# Patient Record
Sex: MALE | Race: WHITE | NOT HISPANIC OR LATINO | Employment: OTHER | URBAN - NONMETROPOLITAN AREA
[De-identification: names, ages, dates, MRNs, and addresses within clinical notes are randomized per-mention and may not be internally consistent; named-entity substitution may affect disease eponyms.]

---

## 2018-11-26 ENCOUNTER — TELEPHONE (OUTPATIENT)
Dept: PULMONOLOGY | Facility: CLINIC | Age: 63
End: 2018-11-26

## 2018-12-05 RX ORDER — ALBUTEROL SULFATE 90 UG/1
2 AEROSOL, METERED RESPIRATORY (INHALATION) EVERY 4 HOURS PRN
Qty: 18 G | Refills: 11 | Status: SHIPPED | OUTPATIENT
Start: 2018-12-05 | End: 2021-02-10 | Stop reason: SDUPTHER

## 2018-12-05 NOTE — TELEPHONE ENCOUNTER
Pt called his insurance company and they will cover his Trelegy and ventolin. Pt asks for these to be sent to [facility] and he will have them put the rx's on hold until 01/01/2019.

## 2019-01-03 DIAGNOSIS — J44.9 CHRONIC OBSTRUCTIVE PULMONARY DISEASE, UNSPECIFIED COPD TYPE (HCC): Primary | ICD-10-CM

## 2019-01-03 NOTE — TELEPHONE ENCOUNTER
Needing Ventolin, Symbicort and Trelegy [facility].  Don't know why it wasn't sent last month.    Please approve these and they will go to the pharmacy.  Patient is going to check with pharmacy tomorrow.

## 2019-01-03 NOTE — TELEPHONE ENCOUNTER
Left message for patient to call back to let us know which inhaler he wants called in Trelegy or Symbicort.

## 2019-01-04 RX ORDER — BUDESONIDE AND FORMOTEROL FUMARATE DIHYDRATE 160; 4.5 UG/1; UG/1
2 AEROSOL RESPIRATORY (INHALATION) 2 TIMES DAILY
Qty: 1 INHALER | Refills: 11 | Status: CANCELLED | OUTPATIENT
Start: 2019-01-04

## 2019-01-04 RX ORDER — ALBUTEROL SULFATE 90 UG/1
2 AEROSOL, METERED RESPIRATORY (INHALATION) EVERY 4 HOURS PRN
Qty: 1 INHALER | Refills: 11 | Status: SHIPPED | OUTPATIENT
Start: 2019-01-04 | End: 2019-02-03

## 2019-01-04 NOTE — TELEPHONE ENCOUNTER
Patient really wants to get both Trelegy and Symbicort. He states he likes to alternate using them and knows not to use them together.  I did tell him if you did approve the refills on both, his insurance may not pay for them.   He prefers Trelegy over Symbicort.

## 2019-01-04 NOTE — TELEPHONE ENCOUNTER
I cannot prescribe them both as I cannot verify that he is alternating them and not taking them at the same time.  We will send in the prescription for Trelegy.

## 2019-04-16 PROBLEM — J44.9 STAGE 4 VERY SEVERE COPD BY GOLD CLASSIFICATION: Status: ACTIVE | Noted: 2019-04-16

## 2019-04-22 PROBLEM — Z87.891 PERSONAL HISTORY OF NICOTINE DEPENDENCE: Status: ACTIVE | Noted: 2019-04-22

## 2019-04-22 PROBLEM — I10 ESSENTIAL HYPERTENSION: Status: ACTIVE | Noted: 2019-04-22

## 2019-04-22 NOTE — PROGRESS NOTES
REYES Sandoval  [facility]   Respiratory Disease Clinic  [address] [place] [address]  Phone: [phone]  Fax: [phone]     Fran Dimas is a 64 y.o. male.   CC:   Chief Complaint   Patient presents with   • Shortness of Breath        HPI: Mr. Dimas is coming in for a follow-up of his COPD.  He does experience moderate persistent shortness of breath on a daily basis.  It is improved with the use of Trelegy.  He is on Bronkaid daily.  He has Ventolin he can use on an as-needed basis.  This is 3-4 times a day at baseline and has done so for years.    The following portions of the patient's history were reviewed and updated as appropriate: allergies, current medications, past family history, past medical history, past social history, past surgical history and problem list.    Past Medical History:   Diagnosis Date   • Essential hypertension 4/22/2019   • Personal history of nicotine dependence 4/22/2019       Family History   Problem Relation Age of Onset   • No Known Problems Mother    • Cancer Father        Social History     Socioeconomic History   • Marital status:      Spouse name: Not on file   • Number of children: Not on file   • Years of education: Not on file   • Highest education level: Not on file   Tobacco Use   • Smoking status: Former Smoker   • Smokeless tobacco: Never Used   Substance and Sexual Activity   • Alcohol use: Yes     Drinks per session: 7 to 9     Comment: daily   • Drug use: No   • Sexual activity: Defer       Review of Systems   Constitutional: Positive for fatigue. Negative for activity change, chills and fever.   HENT: Negative for congestion, postnasal drip, rhinorrhea, sinus pressure, sore throat and trouble swallowing.    Eyes: Negative for blurred vision, double vision and pain.   Respiratory: Positive for cough and shortness of breath. Negative for chest tightness and wheezing.    Cardiovascular: Negative for chest pain, palpitations and  leg swelling.   Gastrointestinal: Negative for abdominal distention, constipation, diarrhea, nausea and vomiting.   Endocrine: Negative for polydipsia, polyphagia and polyuria.   Genitourinary: Negative for dysuria, frequency and urgency.   Musculoskeletal: Negative for arthralgias, back pain, gait problem and joint swelling.   Skin: Negative for color change, dry skin, rash and skin lesions.   Allergic/Immunologic: Negative for environmental allergies, food allergies and immunocompromised state.   Neurological: Negative for dizziness, seizures, speech difficulty, weakness, light-headedness, memory problem and confusion.   Hematological: Negative for adenopathy. Does not bruise/bleed easily.   Psychiatric/Behavioral: Negative for sleep disturbance, negative for hyperactivity and depressed mood. The patient is not nervous/anxious.        .vs    Physical Exam   Constitutional: He is oriented to person, place, and time. He appears well-developed and well-nourished. No distress.   HENT:   Head: Normocephalic and atraumatic.   Right Ear: External ear normal.   Left Ear: External ear normal.   Nose: Nose normal.   Mouth/Throat: Oropharynx is clear and moist. No oropharyngeal exudate.   Eyes: Conjunctivae and EOM are normal. Pupils are equal, round, and reactive to light. Right eye exhibits no discharge. Left eye exhibits no discharge.   Neck: Normal range of motion. Neck supple. No JVD present.   Cardiovascular: Normal rate and regular rhythm.   No murmur heard.  Pulmonary/Chest: Effort normal. No respiratory distress. He has no wheezes. He has rales in the left upper field.   Abdominal: Soft. Bowel sounds are normal. He exhibits no distension. There is no tenderness.   Musculoskeletal: Normal range of motion. He exhibits no edema or deformity.   Neurological: He is alert and oriented to person, place, and time. He displays normal reflexes. No cranial nerve deficit. Coordination normal.   Skin: Skin is warm and dry. No  rash noted. He is not diaphoretic. There is cyanosis. No erythema.   Chronic cyanotic changes to both hands, raynaud's     Psychiatric: He has a normal mood and affect. His behavior is normal. Thought content normal.   Nursing note and vitals reviewed.      Pulmonary Functions Testing Results:    No results found for: FEV1, FVC, [id number], TLC, DLCO  Patient refused PFTs    CXR: No imaging on file since 2012.  Refuses imaging        Problem List Items Addressed This Visit        Cardiovascular and Mediastinum    Essential hypertension       Respiratory    Stage 4 very severe COPD by GOLD classification (CMS/HCC) - Primary       Other    Personal history of nicotine dependence        Patient's Body mass index is 23.57 kg/m². BMI is within normal parameters. No follow-up required..    Dates his breathing is doing as well as it typically does.  He will continue his Trelegy and bronchaid daily since he is benefiting from that.  He has Ventolin he can use when needed.  Reassessment in 6 months or sooner if the patient needs    Kristie Naqvi, REYES  4/23/2019  2:00 PM    Return in about 6 months (around 10/23/2019).

## 2019-04-23 ENCOUNTER — OFFICE VISIT (OUTPATIENT)
Dept: PULMONOLOGY | Facility: CLINIC | Age: 64
End: 2019-04-23

## 2019-04-23 VITALS
WEIGHT: 155 LBS | BODY MASS INDEX: 23.49 KG/M2 | DIASTOLIC BLOOD PRESSURE: 80 MMHG | OXYGEN SATURATION: 99 % | HEIGHT: 68 IN | SYSTOLIC BLOOD PRESSURE: 130 MMHG | HEART RATE: 87 BPM

## 2019-04-23 DIAGNOSIS — J44.9 STAGE 4 VERY SEVERE COPD BY GOLD CLASSIFICATION (HCC): Primary | ICD-10-CM

## 2019-04-23 DIAGNOSIS — I10 ESSENTIAL HYPERTENSION: ICD-10-CM

## 2019-04-23 DIAGNOSIS — Z87.891 PERSONAL HISTORY OF NICOTINE DEPENDENCE: ICD-10-CM

## 2019-04-23 PROCEDURE — 99214 OFFICE O/P EST MOD 30 MIN: CPT | Performed by: NURSE PRACTITIONER

## 2019-05-22 DIAGNOSIS — J44.9 STAGE 4 VERY SEVERE COPD BY GOLD CLASSIFICATION (HCC): Primary | ICD-10-CM

## 2019-10-17 NOTE — PROGRESS NOTES
REYES Sandoval  [facility]   Respiratory Disease Clinic  [address] [place] [address]  Phone: [phone]  Fax: [phone]     Fran Dimas is a 64 y.o. male.   CC:   Chief Complaint   Patient presents with   • COPD        HPI: Mr. Dimas is coming in for a follow-up of his COPD.  He experiences moderate persistent shortness of breath occurring in the chest daily for several years.  It is worsened with activity and improved with the use of daily Bronkaid and bronchodilators.  He is on Trelegy.  He prefers Symbicort however his insurance would not cover it.  He would like samples if we have them available.  He has Ventolin he uses daily approximately 3-4 times.  This is not a new regimen for him.  No new complaints or issues.  No new medicines.    The following portions of the patient's history were reviewed and updated as appropriate: allergies, current medications, past family history, past medical history, past social history, past surgical history and problem list.    Past Medical History:   Diagnosis Date   • Essential hypertension 4/22/2019   • Personal history of nicotine dependence 4/22/2019       Prior to Admission medications    Medication Sig Start Date End Date Taking? Authorizing Provider   albuterol 108 (90 Base) MCG/ACT inhaler Inhale 2 puffs Every 4 (Four) Hours As Needed for Wheezing. 12/5/18   Kristie Naqvi APRN   ePHEDrine-guaiFENesin (BRONKAID)  MG tablet Take 1 tablet by mouth Every 4 (Four) Hours As Needed (congestion). 6/11/19   Kristie Naqvi APRN   Fluticasone-Umeclidin-Vilant (TRELEGY ELLIPTA) 100-62.5-25 MCG/INH aerosol powder  Inhale 1 each Daily. 12/19/18   Kristie Naqvi APRN       Family History   Problem Relation Age of Onset   • No Known Problems Mother    • Cancer Father        Social History     Socioeconomic History   • Marital status:      Spouse name: Not on file   • Number of children: Not on file   • Years of education: Not  "on file   • Highest education level: Not on file   Tobacco Use   • Smoking status: Former Smoker   • Smokeless tobacco: Never Used   Substance and Sexual Activity   • Alcohol use: Yes     Drinks per session: 7 to 9     Comment: daily   • Drug use: No   • Sexual activity: Defer       Review of Systems   Constitutional: Positive for fatigue. Negative for activity change, chills and fever.   HENT: Positive for congestion. Negative for postnasal drip, rhinorrhea, sinus pressure, sore throat and trouble swallowing.    Eyes: Negative for blurred vision, double vision and pain.   Respiratory: Positive for cough, shortness of breath and wheezing. Negative for chest tightness.    Cardiovascular: Negative for chest pain, palpitations and leg swelling.   Gastrointestinal: Negative for abdominal distention, constipation, diarrhea, nausea and vomiting.   Endocrine: Negative for polydipsia, polyphagia and polyuria.   Genitourinary: Negative for dysuria, frequency and urgency.   Musculoskeletal: Negative for arthralgias, back pain, gait problem and joint swelling.   Skin: Negative for color change, dry skin, rash and skin lesions.   Allergic/Immunologic: Negative for environmental allergies, food allergies and immunocompromised state.   Neurological: Negative for dizziness, seizures, speech difficulty, weakness, light-headedness, memory problem and confusion.   Hematological: Negative for adenopathy. Does not bruise/bleed easily.   Psychiatric/Behavioral: Negative for sleep disturbance, negative for hyperactivity and depressed mood. The patient is nervous/anxious.        /82   Pulse 96   Ht 172.7 cm (68\")   Wt 70.3 kg (155 lb)   SpO2 99% Comment: RA  BMI 23.57 kg/m²     Physical Exam   Constitutional: He is oriented to person, place, and time. He appears well-developed and well-nourished. He appears cachectic. No distress.   HENT:   Head: Normocephalic and atraumatic.   Right Ear: External ear normal.   Left Ear: " External ear normal.   Nose: Nose normal.   Mouth/Throat: Oropharynx is clear and moist. No oropharyngeal exudate.   Eyes: Conjunctivae and EOM are normal. Pupils are equal, round, and reactive to light. Right eye exhibits no discharge. Left eye exhibits no discharge.   Neck: Normal range of motion. Neck supple. No JVD present.   Cardiovascular: Normal rate and regular rhythm.   No murmur heard.  Pulmonary/Chest: Effort normal. No accessory muscle usage. Tachypnea noted. No respiratory distress. He has decreased breath sounds. He has no wheezes. He has rhonchi.   Barrel chested   Abdominal: Soft. Bowel sounds are normal. He exhibits no distension. There is no tenderness.   Musculoskeletal: Normal range of motion. He exhibits no edema or deformity.   Neurological: He is alert and oriented to person, place, and time. He displays normal reflexes. No cranial nerve deficit. Coordination normal.   Skin: Skin is warm and dry. No rash noted. He is not diaphoretic. No erythema.   Cool dusky fingers, chronic   Psychiatric: He has a normal mood and affect. His behavior is normal. Thought content normal.   Nursing note and vitals reviewed.      Pulmonary Functions Testing Results:    No notes on file  No PFTs for this visit    CXR: No imaging for this visit        Problem List Items Addressed This Visit        Cardiovascular and Mediastinum    Essential hypertension       Respiratory    Stage 4 very severe COPD by GOLD classification (CMS/HCC) - Primary    Relevant Medications    budesonide-formoterol (SYMBICORT) 160-4.5 MCG/ACT inhaler       Other    Personal history of nicotine dependence        Patient's Body mass index is 23.57 kg/m². BMI is within normal parameters. No follow-up required..      Assessment/Plan         He reports stability from a pulmonary standpoint.  Samples provided of Symbicort.  He is aware he cannot use this and Trelegy together.  He does not need refills on his bronchodilators or his Bronkaid.  He  refuses a flu shot.  Follow-up in 6 months.  He will call sooner if needed.    Kristie Naqvi, APRN  10/24/2019  2:17 PM    Return in about 6 months (around 4/24/2020).

## 2019-10-24 ENCOUNTER — OFFICE VISIT (OUTPATIENT)
Dept: PULMONOLOGY | Facility: CLINIC | Age: 64
End: 2019-10-24

## 2019-10-24 VITALS
DIASTOLIC BLOOD PRESSURE: 82 MMHG | SYSTOLIC BLOOD PRESSURE: 138 MMHG | HEART RATE: 96 BPM | OXYGEN SATURATION: 99 % | WEIGHT: 155 LBS | BODY MASS INDEX: 23.49 KG/M2 | HEIGHT: 68 IN

## 2019-10-24 DIAGNOSIS — J44.9 STAGE 4 VERY SEVERE COPD BY GOLD CLASSIFICATION (HCC): Primary | ICD-10-CM

## 2019-10-24 DIAGNOSIS — Z87.891 PERSONAL HISTORY OF NICOTINE DEPENDENCE: ICD-10-CM

## 2019-10-24 DIAGNOSIS — I10 ESSENTIAL HYPERTENSION: ICD-10-CM

## 2019-10-24 PROCEDURE — 99213 OFFICE O/P EST LOW 20 MIN: CPT | Performed by: NURSE PRACTITIONER

## 2019-10-24 RX ORDER — BUDESONIDE AND FORMOTEROL FUMARATE DIHYDRATE 160; 4.5 UG/1; UG/1
2 AEROSOL RESPIRATORY (INHALATION)
Qty: 2 INHALER | Refills: 0 | COMMUNITY
Start: 2019-10-24 | End: 2019-11-07

## 2020-01-27 DIAGNOSIS — J44.9 STAGE 4 VERY SEVERE COPD BY GOLD CLASSIFICATION (HCC): Primary | ICD-10-CM

## 2020-01-27 RX ORDER — ALBUTEROL SULFATE 90 UG/1
2 AEROSOL, METERED RESPIRATORY (INHALATION) EVERY 4 HOURS PRN
Qty: 1 INHALER | Refills: 11 | Status: SHIPPED | OUTPATIENT
Start: 2020-01-27 | End: 2020-01-28 | Stop reason: SDUPTHER

## 2020-01-28 RX ORDER — ALBUTEROL SULFATE 90 UG/1
2 AEROSOL, METERED RESPIRATORY (INHALATION) EVERY 4 HOURS PRN
Qty: 1 INHALER | Refills: 11 | Status: SHIPPED | OUTPATIENT
Start: 2020-01-28 | End: 2020-10-27 | Stop reason: ALTCHOICE

## 2020-01-28 NOTE — TELEPHONE ENCOUNTER
Fax received stating that the script sent yesterday needed a DX code on it.  There is one but I am resending to them with the same code on it.

## 2020-04-15 NOTE — PROGRESS NOTES
REYES Sandoval  [facility]   Respiratory Disease Clinic  [address] [place] [address]  Phone: [phone]  Fax: [phone]     Fran Dimas is a 65 y.o. male.   CC:   Chief Complaint   Patient presents with   • COPD        HPI: Mr. Dimas is is being evaluated today for management of his COPD.  He experiences moderate persistent shortness of breath occurring in the chest daily for several years.  It is worsened with activity and improved with the use of daily Bronkaid and bronchodilators.  He is on Trelegy.  He prefers Symbicort however his insurance would not cover it.  He utilizes his rescue inhaler 3-4 times daily at baseline.  This is not a new regimen for him.  No new complaints or issues.  No new medicines.  He does not need refills.  He is going to be traveling to [place] to check on his 86-year-old mother who is in assisted living.    The following portions of the patient's history were reviewed and updated as appropriate: allergies, current medications, past family history, past medical history, past social history, past surgical history and problem list.    Past Medical History:   Diagnosis Date   • Essential hypertension 4/22/2019   • Personal history of nicotine dependence 4/22/2019       Prior to Admission medications    Medication Sig Start Date End Date Taking? Authorizing Provider   albuterol 108 (90 Base) MCG/ACT inhaler Inhale 2 puffs Every 4 (Four) Hours As Needed for Wheezing. 12/5/18   Kristie Naqvi APRN   albuterol sulfate HFA (VENTOLIN HFA) 108 (90 Base) MCG/ACT inhaler Inhale 2 puffs Every 4 (Four) Hours As Needed for Wheezing. 1/28/20   Kristie Naqvi APRN   ePHEDrine-guaiFENesin (BRONKAID)  MG tablet Take 1 tablet by mouth Every 4 (Four) Hours As Needed (congestion). 6/11/19   Kristie Naqvi APRN   Fluticasone-Umeclidin-Vilant (TRELEGY ELLIPTA) 100-62.5-25 MCG/INH aerosol powder  Inhale 1 puff Daily. 1/6/20   Kristie Naqvi APRN        Family History   Problem Relation Age of Onset   • No Known Problems Mother    • Cancer Father        Social History     Socioeconomic History   • Marital status:      Spouse name: Not on file   • Number of children: Not on file   • Years of education: Not on file   • Highest education level: Not on file   Tobacco Use   • Smoking status: Former Smoker   • Smokeless tobacco: Never Used   Substance and Sexual Activity   • Alcohol use: Yes     Drinks per session: 7 to 9     Comment: daily   • Drug use: No   • Sexual activity: Defer       Review of Systems   Constitutional: Positive for fatigue. Negative for activity change, chills and fever.   HENT: Positive for congestion. Negative for postnasal drip, rhinorrhea, sinus pressure, sore throat and trouble swallowing.    Eyes: Negative for blurred vision, double vision and pain.   Respiratory: Positive for cough, shortness of breath and wheezing. Negative for chest tightness.    Cardiovascular: Negative for chest pain, palpitations and leg swelling.   Gastrointestinal: Negative for abdominal distention, constipation, diarrhea, nausea and vomiting.   Endocrine: Negative for polydipsia, polyphagia and polyuria.   Genitourinary: Negative for dysuria, frequency and urgency.   Musculoskeletal: Negative for arthralgias, back pain, gait problem and joint swelling.   Skin: Negative for color change, dry skin, rash and skin lesions.   Allergic/Immunologic: Negative for environmental allergies, food allergies and immunocompromised state.   Neurological: Negative for dizziness, seizures, speech difficulty, weakness, light-headedness, memory problem and confusion.   Hematological: Negative for adenopathy. Does not bruise/bleed easily.   Psychiatric/Behavioral: Negative for sleep disturbance, negative for hyperactivity and depressed mood. The patient is not nervous/anxious.        There were no vitals taken for this visit.    Physical Exam:    Complete physical exam not  performed due to telephone encounter, COVID-19 association.  He was awake alert and answers all questions appropriately.  He did not cough or wheeze during our conversation.  He was able to speak in full sentences and did not seem to have any respiratory distress.    Pulmonary Functions Testing Results:      No results found for this or any previous visit.      No PFTs for this visit    CXR: No imaging for this visit        Problem List Items Addressed This Visit        Respiratory    Stage 4 very severe COPD by GOLD classification (CMS/[facility]) - Primary       Other    Personal history of nicotine dependence    Relevant Orders    CT Chest Low Dose Wo        Assessment/Plan         He is stable from a COPD standpoint on [facility] and [facility].  He still has utilizes Ventolin daily which is not new for him.  Symptoms are no worse.  He does not need refills.  We discussed low-dose lung cancer imaging.  We will submit to insurance for approval.  If they approved we will arrange before his follow-up in the fall.  He is aware he can reach out to us sooner if needed otherwise we will see him back in the fall.    This visit has been rescheduled as a phone visit to comply with patient safety concerns in accordance with CDC recommendations. Total time of discussion was 9 minutes.        Kristie Naqvi, REYES  4/28/2020  14:02    Return in about 6 months (around 10/28/2020) for ct.

## 2020-04-28 ENCOUNTER — OFFICE VISIT (OUTPATIENT)
Dept: PULMONOLOGY | Facility: CLINIC | Age: 65
End: 2020-04-28

## 2020-04-28 DIAGNOSIS — J44.9 STAGE 4 VERY SEVERE COPD BY GOLD CLASSIFICATION (HCC): Primary | ICD-10-CM

## 2020-04-28 DIAGNOSIS — Z87.891 PERSONAL HISTORY OF NICOTINE DEPENDENCE: ICD-10-CM

## 2020-04-28 PROCEDURE — 99441 PR PHYS/QHP TELEPHONE EVALUATION 5-10 MIN: CPT | Performed by: NURSE PRACTITIONER

## 2020-10-19 PROBLEM — J98.4 SCARRING OF LUNG: Status: ACTIVE | Noted: 2020-10-19

## 2020-10-19 NOTE — PROGRESS NOTES
"REYES Sandoval  [facility]   Respiratory Disease Clinic  [address] [place] [address]  Phone: [phone]  Fax: [phone]     Fran Dimas is a 65 y.o. male.   CC:   Chief Complaint   Patient presents with   • Stage 4 very severe COPD     No testing; no hopsitalizations; no exposure        HPI: Mr. Dimas is being evaluated today for management of his COPD and lung scarring.  Scarring is present in the left lower lobe on imaging going back to 2012. He experiences moderate persistent shortness of breath occurring in the chest daily for several years.  It is worsened by activity and improved with the use of daily Bronkaid and bronchodilators.  He is on Trelegy.  He prefers Symbicort however his insurance would not cover it.  He utilizes his rescue inhaler 3-4 times daily at baseline which has been a longtime regimen for him.  He does not require supplemental oxygen.  He is needing refills on Bronkaid.  He is in need of a flu shot.  Last office visit we discussed that he meets criteria for low-dose lung cancer imaging.  He requested to have it done before his visit today.  We arranged it.  He did not have the test because \"they would not let me in the building without wearing a mask and I cannot breathe when I wear it\".  He reports because of their request he \"did not go get the stupid test\".  Of note he is in our office today wearing a mask and wearing it properly.    The following portions of the patient's history were reviewed and updated as appropriate: allergies, current medications, past family history, past medical history, past social history, past surgical history and problem list.    Past Medical History:   Diagnosis Date   • Essential hypertension 4/22/2019   • Personal history of nicotine dependence 4/22/2019   • Scarring of lung 10/19/2020       Prior to Admission medications    Medication Sig Start Date End Date Taking? Authorizing Provider   albuterol 108 (90 Base) MCG/ACT " inhaler Inhale 2 puffs Every 4 (Four) Hours As Needed for Wheezing. 12/5/18   Kristie Naqvi APRN   albuterol sulfate HFA (VENTOLIN HFA) 108 (90 Base) MCG/ACT inhaler Inhale 2 puffs Every 4 (Four) Hours As Needed for Wheezing. 1/28/20   Kristie Naqvi APRN   ePHEDrine-guaiFENesin (BRONKAID)  MG tablet Take 1 tablet by mouth Every 4 (Four) Hours As Needed (congestion). 6/11/19   Kristie Naqvi APRN   Fluticasone-Umeclidin-Vilant (TRELEGY ELLIPTA) 100-62.5-25 MCG/INH aerosol powder  Inhale 1 puff Daily. 1/6/20   Kristie Naqvi APRN       Family History   Problem Relation Age of Onset   • No Known Problems Mother    • Cancer Father        Social History     Socioeconomic History   • Marital status:      Spouse name: Not on file   • Number of children: Not on file   • Years of education: Not on file   • Highest education level: Not on file   Tobacco Use   • Smoking status: Former Smoker     Years: 25.00     Types: Cigars   • Smokeless tobacco: Never Used   • Tobacco comment: quit a long time ago; 5 -6 cigars a day   Substance and Sexual Activity   • Alcohol use: Yes     Drinks per session: 7 to 9     Comment: daily   • Drug use: No   • Sexual activity: Defer       Review of Systems   Constitutional: Positive for fatigue. Negative for activity change, chills and fever.   HENT: Positive for congestion. Negative for postnasal drip, rhinorrhea, sinus pressure, sore throat and trouble swallowing.    Eyes: Negative for blurred vision, double vision and pain.   Respiratory: Positive for cough, chest tightness and shortness of breath. Negative for wheezing.    Cardiovascular: Negative for chest pain, palpitations and leg swelling.   Gastrointestinal: Negative for abdominal distention, constipation, diarrhea, nausea and vomiting.   Endocrine: Negative for polydipsia, polyphagia and polyuria.   Genitourinary: Negative for dysuria, frequency and urgency.   Musculoskeletal: Negative for arthralgias, back  "pain, gait problem and joint swelling.   Skin: Negative for color change, dry skin, rash and skin lesions.   Allergic/Immunologic: Negative for environmental allergies, food allergies and immunocompromised state.   Neurological: Negative for dizziness, seizures, speech difficulty, weakness, light-headedness, memory problem and confusion.   Hematological: Negative for adenopathy. Does not bruise/bleed easily.   Psychiatric/Behavioral: Negative for sleep disturbance, negative for hyperactivity and depressed mood. The patient is not nervous/anxious.        /80   Pulse 87   Temp 96.8 °F (36 °C)   Ht 172.7 cm (68\")   Wt 70.8 kg (156 lb)   SpO2 97% Comment: RA  BMI 23.72 kg/m²     Physical Exam  Vitals signs and nursing note reviewed.   Constitutional:       General: He is not in acute distress.     Appearance: He is well-developed. He is not diaphoretic.      Interventions: Face mask in place.   HENT:      Head: Normocephalic and atraumatic.      Right Ear: External ear normal.      Left Ear: External ear normal.      Nose: Nose normal.      Mouth/Throat:      Pharynx: No oropharyngeal exudate.   Eyes:      General:         Right eye: No discharge.         Left eye: No discharge.      Conjunctiva/sclera: Conjunctivae normal.      Pupils: Pupils are equal, round, and reactive to light.   Neck:      Musculoskeletal: Normal range of motion and neck supple.      Vascular: No JVD.   Cardiovascular:      Rate and Rhythm: Normal rate and regular rhythm.      Heart sounds: No murmur.   Pulmonary:      Effort: Accessory muscle usage present. No tachypnea or respiratory distress.      Breath sounds: Normal breath sounds. Decreased air movement present. No wheezing.   Abdominal:      General: Bowel sounds are normal. There is no distension.      Palpations: Abdomen is soft.      Tenderness: There is no abdominal tenderness.   Musculoskeletal: Normal range of motion.         General: No deformity.   Skin:     General: " Skin is warm and dry.      Findings: No erythema or rash.   Neurological:      Mental Status: He is alert and oriented to person, place, and time.      Cranial Nerves: No cranial nerve deficit.      Coordination: Coordination normal.      Deep Tendon Reflexes: Reflexes normal.   Psychiatric:         Behavior: Behavior normal.         Thought Content: Thought content normal.         Pulmonary Functions Testing Results:      No results found for this or any previous visit.      No PFTs for this visit    CXR: No imaging for this visit        Problem List Items Addressed This Visit        Respiratory    Stage 4 very severe COPD by GOLD classification (CMS/[facility]) - Primary    Relevant Medications    ePHEDrine-guaiFENesin (Bronkaid)  MG tablet    Scarring of lung       Other    Personal history of nicotine dependence      Other Visit Diagnoses     Need for immunization against influenza            Patient's Body mass index is 23.72 kg/m². BMI is within normal parameters. No follow-up required..      Assessment/Plan         Low-dose CT scan not performed due to patient refusing to wear a mask to have the procedure complete.  This COPD remains unchanged.  He will continue Trelegy and Bronkaid since those are beneficial.  I sent in a refill for the Bronkaid.  His flu shot was updated.  Informed patient if he changes his mind about having low-dose CT imaging to reach out to us and we can schedule it again otherwise test not completed due to patient's noncompliance.  Follow-up in 6 months.  He will call sooner if needed.    Kristie Naqvi, APRN  10/27/2020  16:19 CDT    Return in about 6 months (around 4/27/2021).

## 2020-10-27 ENCOUNTER — TELEPHONE (OUTPATIENT)
Dept: PULMONOLOGY | Facility: CLINIC | Age: 65
End: 2020-10-27

## 2020-10-27 ENCOUNTER — OFFICE VISIT (OUTPATIENT)
Dept: PULMONOLOGY | Facility: CLINIC | Age: 65
End: 2020-10-27

## 2020-10-27 VITALS
OXYGEN SATURATION: 97 % | HEIGHT: 68 IN | DIASTOLIC BLOOD PRESSURE: 80 MMHG | TEMPERATURE: 96.8 F | WEIGHT: 156 LBS | SYSTOLIC BLOOD PRESSURE: 132 MMHG | BODY MASS INDEX: 23.64 KG/M2 | HEART RATE: 87 BPM

## 2020-10-27 DIAGNOSIS — J44.9 STAGE 4 VERY SEVERE COPD BY GOLD CLASSIFICATION (HCC): ICD-10-CM

## 2020-10-27 DIAGNOSIS — Z87.891 PERSONAL HISTORY OF NICOTINE DEPENDENCE: ICD-10-CM

## 2020-10-27 DIAGNOSIS — Z23 NEED FOR IMMUNIZATION AGAINST INFLUENZA: ICD-10-CM

## 2020-10-27 DIAGNOSIS — J98.4 SCARRING OF LUNG: ICD-10-CM

## 2020-10-27 DIAGNOSIS — J44.9 STAGE 4 VERY SEVERE COPD BY GOLD CLASSIFICATION (HCC): Primary | ICD-10-CM

## 2020-10-27 PROCEDURE — 90694 VACC AIIV4 NO PRSRV 0.5ML IM: CPT | Performed by: NURSE PRACTITIONER

## 2020-10-27 PROCEDURE — 99214 OFFICE O/P EST MOD 30 MIN: CPT | Performed by: NURSE PRACTITIONER

## 2020-10-27 PROCEDURE — G0008 ADMIN INFLUENZA VIRUS VAC: HCPCS | Performed by: NURSE PRACTITIONER

## 2020-10-27 RX ORDER — EPHEDRINE SULFATE AND GUAIFENESIN 25; 400 MG/1; MG/1
1 TABLET, COATED ORAL EVERY 4 HOURS
Qty: 240 TABLET | Refills: 5 | Status: SHIPPED | OUTPATIENT
Start: 2020-10-27 | End: 2020-10-28 | Stop reason: SDUPTHER

## 2020-10-27 NOTE — TELEPHONE ENCOUNTER
Patient called stating the script was sent to [facility] and it was needing to go to [facility].

## 2020-10-28 RX ORDER — EPHEDRINE SULFATE AND GUAIFENESIN 25; 400 MG/1; MG/1
1 TABLET, COATED ORAL EVERY 4 HOURS
Qty: 240 TABLET | Refills: 5 | Status: SHIPPED | OUTPATIENT
Start: 2020-10-28 | End: 2020-11-27

## 2020-12-22 NOTE — TELEPHONE ENCOUNTER
[facility] is requesting refill on Trelegy inhaler  LOV- 10/27/2020  NOV - 04/27/2021

## 2021-02-10 DIAGNOSIS — J44.9 STAGE 4 VERY SEVERE COPD BY GOLD CLASSIFICATION (HCC): Primary | ICD-10-CM

## 2021-02-10 RX ORDER — ALBUTEROL SULFATE 90 UG/1
2 AEROSOL, METERED RESPIRATORY (INHALATION) EVERY 4 HOURS PRN
Qty: 18 G | Refills: 11 | Status: SHIPPED | OUTPATIENT
Start: 2021-02-10 | End: 2022-04-22 | Stop reason: SDUPTHER

## 2021-04-20 PROBLEM — J98.4 SCARRING OF LUNG: Chronic | Status: ACTIVE | Noted: 2020-10-19

## 2021-04-20 PROBLEM — Z87.891 PERSONAL HISTORY OF NICOTINE DEPENDENCE: Chronic | Status: ACTIVE | Noted: 2019-04-22

## 2021-04-20 PROBLEM — J44.9 STAGE 4 VERY SEVERE COPD BY GOLD CLASSIFICATION: Chronic | Status: ACTIVE | Noted: 2019-04-16

## 2021-04-20 NOTE — PROGRESS NOTES
Chief Complaint  COPD    Subjective    History of Present Illness {CC  Problem List  Visit Diagnosis   Encounters  Notes  Medications  Labs  Result Review Imaging  Media     Fran Dimas presents to [facility] for:    Management of his COPD and lung scarring.  Scarring is present in the left lower lobe on imaging going back to 2012.  Most recent FEV1 was 25.  He experiences frequent shortness of breath and coughing.  He takes Trelegy and daily Bronkaid which helps. He prefers Symbicort however his insurance would not cover it.  He utilizes his rescue inhaler 3-4 times daily at baseline which has been a longtime regimen for him.  He does not need refills on his inhalers.  He needs refill on the Bronkaid.  He is inquiring as to whether or not there is a different inhaler he can take twice a day. He does not require supplemental oxygen. Last office visit we discussed that he meets criteria for low-dose lung cancer imaging.  He was agreeable but did not complete it due to the requirement of wearing a mask.         Prior to Admission medications    Medication Sig Start Date End Date Taking? Authorizing Provider   albuterol sulfate  (90 Base) MCG/ACT inhaler Inhale 2 puffs Every 4 (Four) Hours As Needed for Wheezing. 2/10/21   Kristie Naqvi APRN   Fluticasone-Umeclidin-Vilant (Trelegy Ellipta) 100-62.5-25 MCG/INH aerosol powder  Inhale 1 puff Daily. 12/22/20   Kristie Naqvi APRN       Social History     Socioeconomic History   • Marital status:      Spouse name: Not on file   • Number of children: Not on file   • Years of education: Not on file   • Highest education level: Not on file   Tobacco Use   • Smoking status: Former Smoker     Years: 25.00     Types: Cigars   • Smokeless tobacco: Never Used   • Tobacco comment: quit a long time ago; 5 -6 cigars a day   Substance and Sexual Activity   • Alcohol use: Yes     Comment: daily   • Drug  "use: No   • Sexual activity: Defer       Objective   Vital Signs:   /80   Pulse 92   Ht 172.7 cm (68\")   Wt 69.9 kg (154 lb)   SpO2 99% Comment: RA  BMI 23.42 kg/m²     Physical Exam  Constitutional:       Interventions: Face mask in place.   Eyes:      Comments: Glasses   Cardiovascular:      Rate and Rhythm: Normal rate and regular rhythm.      Heart sounds: No murmur heard.     Pulmonary:      Effort: Pulmonary effort is normal. No tachypnea, accessory muscle usage or respiratory distress.      Breath sounds: Normal breath sounds. Decreased air movement present.   Musculoskeletal:      Right lower leg: No edema.      Left lower leg: No edema.   Skin:     Coloration: Skin is mottled.      Nails: There is clubbing.   Neurological:      Mental Status: He is alert and oriented to person, place, and time.        Result Review :{ Labs  Result Review  Imaging  Med Tab  Media :      My interpretation of the PFT: None for this visit    No results found for this or any previous visit.    My interpretation of imaging: None for this visit    My interpretation of labs: None for this visit      Assessment and Plan {CC Problem List  Visit Diagnosis  ROS  Review (Popup)  Health Maintenance  Quality  BestPractice  Medications  SmartSets  SnapShot Encounters  Media      Diagnoses and all orders for this visit:    1. Stage 4 very severe COPD by GOLD classification (CMS/HCC) (Primary)  Comments:  Sample of Breztri.  Stop Trelegy while taking.  Call for prescription if it helps.  Bronkaid refilled.  Update spirometry with follow-up  Orders:  -     ePHEDrine-guaiFENesin  MG tablet; Take 1 tablet by mouth 4 (Four) Times a Day for 30 days.  Dispense: 84 tablet; Refill: 2  -     Budeson-Glycopyrrol-Formoterol (BREZTRI) 160-9-4.8 MCG/ACT aerosol inhaler; Inhale 2 puffs 2 (Two) Times a Day for 14 days.  Dispense: 1 each; Refill: 0    2. Personal history of nicotine dependence  Comments:  Continue to avoid " smoking.  Low-dose CT recommended.  He does not want to pursue that at this time.        Patient's Body mass index is 23.42 kg/m². BMI is within normal parameters. No follow-up required..    Kristie Naqvi, APRN  4/27/2021  15:45 CDT    Follow Up {Instructions Charge Capture  Follow-up Communications   Return in about 6 months (around 10/27/2021).    Patient was given instructions and counseling regarding his condition or for health maintenance advice. Please see specific information pulled into the AVS if appropriate.

## 2021-04-27 ENCOUNTER — OFFICE VISIT (OUTPATIENT)
Dept: PULMONOLOGY | Facility: CLINIC | Age: 66
End: 2021-04-27

## 2021-04-27 VITALS
DIASTOLIC BLOOD PRESSURE: 80 MMHG | HEART RATE: 92 BPM | BODY MASS INDEX: 23.34 KG/M2 | SYSTOLIC BLOOD PRESSURE: 130 MMHG | WEIGHT: 154 LBS | OXYGEN SATURATION: 99 % | HEIGHT: 68 IN

## 2021-04-27 DIAGNOSIS — J44.9 STAGE 4 VERY SEVERE COPD BY GOLD CLASSIFICATION (HCC): Primary | Chronic | ICD-10-CM

## 2021-04-27 DIAGNOSIS — Z87.891 PERSONAL HISTORY OF NICOTINE DEPENDENCE: Chronic | ICD-10-CM

## 2021-04-27 PROCEDURE — 99214 OFFICE O/P EST MOD 30 MIN: CPT | Performed by: NURSE PRACTITIONER

## 2021-04-29 DIAGNOSIS — J44.9 STAGE 4 VERY SEVERE COPD BY GOLD CLASSIFICATION (HCC): Chronic | ICD-10-CM

## 2021-04-29 NOTE — TELEPHONE ENCOUNTER
"Patient is asking for a prescription for Breztri. He received a sample at his last office visit and it \"works better than Trelegy.\"  When I put the order in to be signed it looks like it is not covered.  "

## 2021-05-04 ENCOUNTER — TELEPHONE (OUTPATIENT)
Dept: PULMONOLOGY | Facility: CLINIC | Age: 66
End: 2021-05-04

## 2021-05-07 DIAGNOSIS — J44.9 STAGE 4 VERY SEVERE COPD BY GOLD CLASSIFICATION (HCC): Chronic | ICD-10-CM

## 2021-05-11 ENCOUNTER — TELEPHONE (OUTPATIENT)
Dept: PULMONOLOGY | Facility: CLINIC | Age: 66
End: 2021-05-11

## 2021-05-11 NOTE — TELEPHONE ENCOUNTER
Notified patient to go back on the trelegy.  He is to let his pharmacy know when he needs refills.

## 2021-05-11 NOTE — TELEPHONE ENCOUNTER
Breztri appeal was denied and the alternatives are Advair, Anoro, Breo, Combivent, or Ipratropium/albuterol solution.    What do you want to switch him to?

## 2021-06-04 DIAGNOSIS — J44.9 STAGE 4 VERY SEVERE COPD BY GOLD CLASSIFICATION (HCC): Chronic | ICD-10-CM

## 2021-06-04 NOTE — TELEPHONE ENCOUNTER
Patient came in the office saying that he had called stating that he needed a sample of Breztri. No one seems to have got that message or called him to let him know. It looks like it has been rejected by his insurance. Patient's insurance stated in the denial letter that Duo-neb solution, breo, Advair diskus, combivent, anoro and Trelegy. I didn't know if you wanted to give him a sample of the breztri inhaler or not.    LOV 04/27/2021  NOV 10/26/2021

## 2021-08-13 DIAGNOSIS — J44.9 STAGE 4 VERY SEVERE COPD BY GOLD CLASSIFICATION (HCC): Chronic | ICD-10-CM

## 2021-08-13 NOTE — TELEPHONE ENCOUNTER
Rx Refill Note  Requested Prescriptions     Pending Prescriptions Disp Refills   • Budeson-Glycopyrrol-Formoterol (BREZTRI) 160-9-4.8 MCG/ACT aerosol inhaler 2 each 0     Sig: Inhale 2 puffs 2 (Two) Times a Day.      Last office visit with prescribing clinician: 4/27/2021      Next office visit with prescribing clinician: 10/26/2021            Catherine Hidalgo CMA  08/13/21, 14:24 CDT     Patient called for samples.   Pt told they would be up front.

## 2021-10-12 NOTE — PROGRESS NOTES
"Chief Complaint  Stage 4 very severe COPD by GOLD classification (CMS/[facility]) (\"off and on\" per pt when asked how he has been )    Subjective    History of Present Illness {CC  Problem List  Visit Diagnosis   Encounters  Notes  Medications  Labs  Result Review Imaging  Media     Fran Dimas presents to [facility] MEDIC[facility] for:    Management of his COPD and lung scarring.  Scarring is present in the left lower lobe on imaging going back to 2012.  Most recent FEV1 was 25.  He is a former smoker.  Offered low-dose lung cancer imaging which he declined.  Offered flu shot which he declined.  He experiences frequent shortness of breath and coughing.  He takes Trelegy and daily Bronkaid which helps. He prefers Symbicort however his insurance would not cover it.  He also benefited from Breztri however his insurance would not cover this either.  He utilizes his rescue inhaler 3-4 times daily at baseline which has been a longtime regimen for him.  He would like samples if we have them available.  He does not require supplemental oxygen.  Difficulty with obtaining pulse oximeter today.  His hands are very cold and discolored.  No cyanosis anywhere else.  He is not short of breath.  He has no tachypnea.       Prior to Admission medications    Medication Sig Start Date End Date Taking? Authorizing Provider   albuterol sulfate  (90 Base) MCG/ACT inhaler Inhale 2 puffs Every 4 (Four) Hours As Needed for Wheezing.  Patient taking differently: Inhale 2 puffs Every 4 (Four) Hours As Needed for Wheezing. Patient prefers the ventolin inhaler. 2/10/21   Kristie Naqvi APRN   Budeson-Glycopyrrol-Formoterol (BREZTRI) 160-9-4.8 MCG/ACT aerosol inhaler Inhale 2 puffs 2 (Two) Times a Day. 8/13/21   Kristie Naqvi APRN       Social History     Socioeconomic History   • Marital status:    Tobacco Use   • Smoking status: Former Smoker     Years: 25.00     Types: Cigars   • " "Smokeless tobacco: Never Used   • Tobacco comment: quit a long time ago; 5 -6 cigars a day   Substance and Sexual Activity   • Alcohol use: Yes     Comment: daily   • Drug use: No   • Sexual activity: Defer       Objective   Vital Signs:   /64   Ht 172.7 cm (68\")   Wt 69.9 kg (154 lb)   BMI 23.42 kg/m²     Physical Exam  Constitutional:       Interventions: Face mask in place.   Eyes:      Comments: Glasses   Cardiovascular:      Rate and Rhythm: Normal rate and regular rhythm.      Heart sounds: No murmur heard.      Pulmonary:      Effort: Pulmonary effort is normal. No tachypnea, accessory muscle usage or respiratory distress.      Breath sounds: Normal breath sounds. Decreased air movement present.   Musculoskeletal:      Right lower leg: No edema.      Left lower leg: No edema.   Skin:     Comments: Peripheral cyanosis, fingers very cold to the touch, no signs of central cyanosis   Neurological:      Mental Status: He is alert and oriented to person, place, and time.        Result Review :{ Labs  Result Review  Imaging  Med Tab  Media :      My interpretation of the PFT: none for this visit    No results found for this or any previous visit.    My interpretation of imaging:  None for this visit    My interpretation of labs: none for this visit      Assessment and Plan {CC Problem List  Visit Diagnosis  ROS  Review (Popup)  Health Maintenance  Quality  BestPractice  Medications  SmartSets  SnapShot Encounters  Media      Diagnoses and all orders for this visit:    1. Stage 4 very severe COPD by GOLD classification (HCC) (Primary)  Comments:  Continue triple therapy.  Samples Breztri provided.  Resume Trelegy when out of samples.  Flu shot offered which he declined.  PFTs offered, declined  Orders:  -     Budeson-Glycopyrrol-Formoterol (Breztri Aerosphere) 160-9-4.8 MCG/ACT aerosol inhaler; Inhale 2 puffs 2 (Two) Times a Day for 14 days.  Dispense: 2 each; Refill: 0    2. Scarring of " lung  Comments:  Imaging offered.  Patient declined    3. Personal history of nicotine dependence  Comments:  Continue to avoid smoking      Extremity shows signs of possible Raynaud's.  Offered work-up for this condition.  Patient declined.  He has no pain associated with his condition.  We did discuss wearing gloves to protect his extremities.  He indicated he would not do that.    Patient's Body mass index is 23.42 kg/m². indicating that he is within normal range (BMI 18.5-24.9). No BMI management plan needed..        Kristie Naqvi, APRN  10/26/2021  14:56 CDT    Follow Up {Instructions Charge Capture  Follow-up Communications   Return in about 6 months (around 4/26/2022).    Patient was given instructions and counseling regarding his condition or for health maintenance advice. Please see specific information pulled into the AVS if appropriate.

## 2021-10-26 ENCOUNTER — OFFICE VISIT (OUTPATIENT)
Dept: PULMONOLOGY | Facility: CLINIC | Age: 66
End: 2021-10-26

## 2021-10-26 VITALS
BODY MASS INDEX: 23.34 KG/M2 | WEIGHT: 154 LBS | DIASTOLIC BLOOD PRESSURE: 64 MMHG | SYSTOLIC BLOOD PRESSURE: 118 MMHG | HEIGHT: 68 IN

## 2021-10-26 DIAGNOSIS — Z87.891 PERSONAL HISTORY OF NICOTINE DEPENDENCE: Chronic | ICD-10-CM

## 2021-10-26 DIAGNOSIS — J98.4 SCARRING OF LUNG: Chronic | ICD-10-CM

## 2021-10-26 DIAGNOSIS — J44.9 STAGE 4 VERY SEVERE COPD BY GOLD CLASSIFICATION (HCC): Primary | Chronic | ICD-10-CM

## 2021-10-26 PROCEDURE — 99213 OFFICE O/P EST LOW 20 MIN: CPT | Performed by: NURSE PRACTITIONER

## 2021-10-26 RX ORDER — BUDESONIDE, GLYCOPYRROLATE, AND FORMOTEROL FUMARATE 160; 9; 4.8 UG/1; UG/1; UG/1
2 AEROSOL, METERED RESPIRATORY (INHALATION) 2 TIMES DAILY
Qty: 2 EACH | Refills: 0 | COMMUNITY
Start: 2021-10-26 | End: 2021-11-09

## 2022-01-19 DIAGNOSIS — J44.9 STAGE 4 VERY SEVERE COPD BY GOLD CLASSIFICATION: Chronic | ICD-10-CM

## 2022-01-19 NOTE — TELEPHONE ENCOUNTER
Patient called to request refills on Breztri. Patient states his insurance company will cover this now. He knows he is not supposed to take the Trelegy with the Breztri.   Rx Refill Note  Requested Prescriptions     Pending Prescriptions Disp Refills   • Budeson-Glycopyrrol-Formoterol (BREZTRI) 160-9-4.8 MCG/ACT aerosol inhaler 2 each 0     Sig: Inhale 2 puffs 2 (Two) Times a Day.      Last office visit with prescribing clinician: 10/26/2021      Next office visit with prescribing clinician: 5/3/2022            Cedrick Hirsch CMA  01/19/22, 13:47 CST

## 2022-01-23 ENCOUNTER — APPOINTMENT (OUTPATIENT)
Dept: CT IMAGING | Age: 67
DRG: 101 | End: 2022-01-23
Payer: MEDICARE

## 2022-01-23 ENCOUNTER — APPOINTMENT (OUTPATIENT)
Dept: GENERAL RADIOLOGY | Age: 67
DRG: 101 | End: 2022-01-23
Payer: MEDICARE

## 2022-01-23 ENCOUNTER — HOSPITAL ENCOUNTER (INPATIENT)
Age: 67
LOS: 2 days | Discharge: HOME | DRG: 101 | End: 2022-01-25
Attending: EMERGENCY MEDICINE
Payer: MEDICARE

## 2022-01-23 DIAGNOSIS — R56.9 NEW ONSET SEIZURE (HCC): Primary | ICD-10-CM

## 2022-01-23 DIAGNOSIS — R29.810 FACIAL DROOP: ICD-10-CM

## 2022-01-23 DIAGNOSIS — R41.82 ALTERED MENTAL STATUS, UNSPECIFIED ALTERED MENTAL STATUS TYPE: ICD-10-CM

## 2022-01-23 PROBLEM — F10.10 ALCOHOL ABUSE, DAILY USE: Status: ACTIVE | Noted: 2022-01-23

## 2022-01-23 PROBLEM — E87.1 HYPONATREMIA: Status: ACTIVE | Noted: 2022-01-23

## 2022-01-23 PROBLEM — R29.90 STROKE-LIKE SYMPTOMS: Status: ACTIVE | Noted: 2022-01-23

## 2022-01-23 PROBLEM — J44.9 COPD (CHRONIC OBSTRUCTIVE PULMONARY DISEASE) (HCC): Status: ACTIVE | Noted: 2022-01-23

## 2022-01-23 PROBLEM — G93.40 ENCEPHALOPATHY ACUTE: Status: ACTIVE | Noted: 2022-01-23

## 2022-01-23 LAB
ALBUMIN SERPL-MCNC: 3.6 G/DL (ref 3.5–5.2)
ALP BLD-CCNC: 53 U/L (ref 40–130)
ALT SERPL-CCNC: 11 U/L (ref 5–41)
AMMONIA: 31 UMOL/L (ref 16–60)
ANION GAP SERPL CALCULATED.3IONS-SCNC: 24 MMOL/L (ref 7–19)
AST SERPL-CCNC: 19 U/L (ref 5–40)
BASOPHILS ABSOLUTE: 0.1 K/UL (ref 0–0.2)
BASOPHILS RELATIVE PERCENT: 1.1 % (ref 0–1)
BILIRUB SERPL-MCNC: 0.4 MG/DL (ref 0.2–1.2)
BUN BLDV-MCNC: 10 MG/DL (ref 8–23)
CALCIUM SERPL-MCNC: 8.6 MG/DL (ref 8.8–10.2)
CHLORIDE BLD-SCNC: 90 MMOL/L (ref 98–111)
CO2: 13 MMOL/L (ref 22–29)
CREAT SERPL-MCNC: 0.8 MG/DL (ref 0.5–1.2)
EOSINOPHILS ABSOLUTE: 0 K/UL (ref 0–0.6)
EOSINOPHILS RELATIVE PERCENT: 0.5 % (ref 0–5)
ETHANOL: 16 MG/DL (ref 0–0.08)
GFR AFRICAN AMERICAN: >59
GFR NON-AFRICAN AMERICAN: >60
GLUCOSE BLD-MCNC: 144 MG/DL (ref 74–109)
GLUCOSE BLD-MCNC: 161 MG/DL
HCT VFR BLD CALC: 45.2 % (ref 42–52)
HEMOGLOBIN: 14.1 G/DL (ref 14–18)
IMMATURE GRANULOCYTES #: 0 K/UL
INR BLD: 1.17 (ref 0.88–1.18)
LYMPHOCYTES ABSOLUTE: 1.4 K/UL (ref 1.1–4.5)
LYMPHOCYTES RELATIVE PERCENT: 18.8 % (ref 20–40)
MCH RBC QN AUTO: 31.5 PG (ref 27–31)
MCHC RBC AUTO-ENTMCNC: 31.2 G/DL (ref 33–37)
MCV RBC AUTO: 101.1 FL (ref 80–94)
MONOCYTES ABSOLUTE: 0.8 K/UL (ref 0–0.9)
MONOCYTES RELATIVE PERCENT: 10.9 % (ref 0–10)
NEUTROPHILS ABSOLUTE: 5 K/UL (ref 1.5–7.5)
NEUTROPHILS RELATIVE PERCENT: 68.2 % (ref 50–65)
PDW BLD-RTO: 14.7 % (ref 11.5–14.5)
PLATELET # BLD: 252 K/UL (ref 130–400)
PMV BLD AUTO: 9.3 FL (ref 9.4–12.4)
POTASSIUM REFLEX MAGNESIUM: 4.1 MMOL/L (ref 3.5–5)
PROTHROMBIN TIME: 15.1 SEC (ref 12–14.6)
RBC # BLD: 4.47 M/UL (ref 4.7–6.1)
SARS-COV-2, NAAT: NOT DETECTED
SODIUM BLD-SCNC: 127 MMOL/L (ref 136–145)
TOTAL PROTEIN: 6.6 G/DL (ref 6.6–8.7)
TROPONIN: <0.01 NG/ML (ref 0–0.03)
WBC # BLD: 7.4 K/UL (ref 4.8–10.8)

## 2022-01-23 PROCEDURE — 84484 ASSAY OF TROPONIN QUANT: CPT

## 2022-01-23 PROCEDURE — 2580000003 HC RX 258: Performed by: NURSE PRACTITIONER

## 2022-01-23 PROCEDURE — 96374 THER/PROPH/DIAG INJ IV PUSH: CPT

## 2022-01-23 PROCEDURE — 71045 X-RAY EXAM CHEST 1 VIEW: CPT

## 2022-01-23 PROCEDURE — 70450 CT HEAD/BRAIN W/O DYE: CPT

## 2022-01-23 PROCEDURE — 93005 ELECTROCARDIOGRAM TRACING: CPT | Performed by: EMERGENCY MEDICINE

## 2022-01-23 PROCEDURE — 70496 CT ANGIOGRAPHY HEAD: CPT

## 2022-01-23 PROCEDURE — 2580000003 HC RX 258: Performed by: EMERGENCY MEDICINE

## 2022-01-23 PROCEDURE — 87635 SARS-COV-2 COVID-19 AMP PRB: CPT

## 2022-01-23 PROCEDURE — 85025 COMPLETE CBC W/AUTO DIFF WBC: CPT

## 2022-01-23 PROCEDURE — 36415 COLL VENOUS BLD VENIPUNCTURE: CPT

## 2022-01-23 PROCEDURE — 85610 PROTHROMBIN TIME: CPT

## 2022-01-23 PROCEDURE — 6360000002 HC RX W HCPCS: Performed by: EMERGENCY MEDICINE

## 2022-01-23 PROCEDURE — 96375 TX/PRO/DX INJ NEW DRUG ADDON: CPT

## 2022-01-23 PROCEDURE — 82077 ASSAY SPEC XCP UR&BREATH IA: CPT

## 2022-01-23 PROCEDURE — 80053 COMPREHEN METABOLIC PANEL: CPT

## 2022-01-23 PROCEDURE — 82140 ASSAY OF AMMONIA: CPT

## 2022-01-23 PROCEDURE — 1210000000 HC MED SURG R&B

## 2022-01-23 PROCEDURE — 99284 EMERGENCY DEPT VISIT MOD MDM: CPT

## 2022-01-23 PROCEDURE — 6360000002 HC RX W HCPCS: Performed by: NURSE PRACTITIONER

## 2022-01-23 RX ORDER — LORAZEPAM 1 MG/1
4 TABLET ORAL
Status: DISCONTINUED | OUTPATIENT
Start: 2022-01-23 | End: 2022-01-25 | Stop reason: HOSPADM

## 2022-01-23 RX ORDER — ALBUTEROL SULFATE 90 UG/1
2 AEROSOL, METERED RESPIRATORY (INHALATION) EVERY 6 HOURS PRN
COMMUNITY

## 2022-01-23 RX ORDER — LORAZEPAM 2 MG/ML
1 INJECTION INTRAMUSCULAR
Status: DISCONTINUED | OUTPATIENT
Start: 2022-01-23 | End: 2022-01-25 | Stop reason: HOSPADM

## 2022-01-23 RX ORDER — LORAZEPAM 1 MG/1
3 TABLET ORAL
Status: DISCONTINUED | OUTPATIENT
Start: 2022-01-23 | End: 2022-01-25 | Stop reason: HOSPADM

## 2022-01-23 RX ORDER — LEVETIRACETAM 10 MG/ML
1000 INJECTION INTRAVASCULAR ONCE
Status: COMPLETED | OUTPATIENT
Start: 2022-01-23 | End: 2022-01-23

## 2022-01-23 RX ORDER — SODIUM CHLORIDE 9 MG/ML
INJECTION, SOLUTION INTRAVENOUS CONTINUOUS
Status: DISCONTINUED | OUTPATIENT
Start: 2022-01-23 | End: 2022-01-24

## 2022-01-23 RX ORDER — SODIUM CHLORIDE, SODIUM LACTATE, POTASSIUM CHLORIDE, AND CALCIUM CHLORIDE .6; .31; .03; .02 G/100ML; G/100ML; G/100ML; G/100ML
1000 INJECTION, SOLUTION INTRAVENOUS ONCE
Status: COMPLETED | OUTPATIENT
Start: 2022-01-23 | End: 2022-01-23

## 2022-01-23 RX ORDER — MULTIVITAMIN WITH IRON
1 TABLET ORAL DAILY
Status: DISCONTINUED | OUTPATIENT
Start: 2022-01-23 | End: 2022-01-25 | Stop reason: HOSPADM

## 2022-01-23 RX ORDER — LORAZEPAM 1 MG/1
2 TABLET ORAL
Status: DISCONTINUED | OUTPATIENT
Start: 2022-01-23 | End: 2022-01-25 | Stop reason: HOSPADM

## 2022-01-23 RX ORDER — LORAZEPAM 2 MG/ML
3 INJECTION INTRAMUSCULAR
Status: DISCONTINUED | OUTPATIENT
Start: 2022-01-23 | End: 2022-01-25 | Stop reason: HOSPADM

## 2022-01-23 RX ORDER — SODIUM CHLORIDE 9 MG/ML
25 INJECTION, SOLUTION INTRAVENOUS PRN
Status: DISCONTINUED | OUTPATIENT
Start: 2022-01-23 | End: 2022-01-25 | Stop reason: HOSPADM

## 2022-01-23 RX ORDER — LORAZEPAM 2 MG/ML
2 INJECTION INTRAMUSCULAR
Status: DISCONTINUED | OUTPATIENT
Start: 2022-01-23 | End: 2022-01-25 | Stop reason: HOSPADM

## 2022-01-23 RX ORDER — THIAMINE HYDROCHLORIDE 100 MG/ML
100 INJECTION, SOLUTION INTRAMUSCULAR; INTRAVENOUS DAILY
Status: DISCONTINUED | OUTPATIENT
Start: 2022-01-23 | End: 2022-01-25 | Stop reason: HOSPADM

## 2022-01-23 RX ORDER — LORAZEPAM 1 MG/1
1 TABLET ORAL
Status: DISCONTINUED | OUTPATIENT
Start: 2022-01-23 | End: 2022-01-25 | Stop reason: HOSPADM

## 2022-01-23 RX ORDER — LORAZEPAM 2 MG/ML
2 INJECTION INTRAMUSCULAR ONCE
Status: COMPLETED | OUTPATIENT
Start: 2022-01-23 | End: 2022-01-23

## 2022-01-23 RX ORDER — SODIUM CHLORIDE 0.9 % (FLUSH) 0.9 %
5-40 SYRINGE (ML) INJECTION PRN
Status: DISCONTINUED | OUTPATIENT
Start: 2022-01-23 | End: 2022-01-25 | Stop reason: HOSPADM

## 2022-01-23 RX ORDER — LORAZEPAM 2 MG/ML
4 INJECTION INTRAMUSCULAR
Status: DISCONTINUED | OUTPATIENT
Start: 2022-01-23 | End: 2022-01-25 | Stop reason: HOSPADM

## 2022-01-23 RX ORDER — SODIUM CHLORIDE 0.9 % (FLUSH) 0.9 %
5-40 SYRINGE (ML) INJECTION EVERY 12 HOURS SCHEDULED
Status: DISCONTINUED | OUTPATIENT
Start: 2022-01-23 | End: 2022-01-25 | Stop reason: HOSPADM

## 2022-01-23 RX ORDER — LORAZEPAM 2 MG/ML
INJECTION INTRAMUSCULAR
Status: DISPENSED
Start: 2022-01-23 | End: 2022-01-24

## 2022-01-23 RX ORDER — BUDESONIDE, GLYCOPYRROLATE, AND FORMOTEROL FUMARATE 160; 9; 4.8 UG/1; UG/1; UG/1
AEROSOL, METERED RESPIRATORY (INHALATION)
COMMUNITY

## 2022-01-23 RX ADMIN — SODIUM CHLORIDE, PRESERVATIVE FREE 10 ML: 5 INJECTION INTRAVENOUS at 21:28

## 2022-01-23 RX ADMIN — LORAZEPAM 2 MG: 2 INJECTION INTRAMUSCULAR; INTRAVENOUS at 18:12

## 2022-01-23 RX ADMIN — LEVETIRACETAM 1000 MG: 10 INJECTION INTRAVENOUS at 18:41

## 2022-01-23 RX ADMIN — SODIUM CHLORIDE: 9 INJECTION, SOLUTION INTRAVENOUS at 21:29

## 2022-01-23 RX ADMIN — THIAMINE HYDROCHLORIDE 100 MG: 100 INJECTION, SOLUTION INTRAMUSCULAR; INTRAVENOUS at 21:27

## 2022-01-23 RX ADMIN — SODIUM CHLORIDE, POTASSIUM CHLORIDE, SODIUM LACTATE AND CALCIUM CHLORIDE 1000 ML: 600; 310; 30; 20 INJECTION, SOLUTION INTRAVENOUS at 19:12

## 2022-01-24 ENCOUNTER — APPOINTMENT (OUTPATIENT)
Dept: MRI IMAGING | Age: 67
DRG: 101 | End: 2022-01-24
Payer: MEDICARE

## 2022-01-24 PROBLEM — E16.2 HYPOGLYCEMIA: Status: ACTIVE | Noted: 2022-01-24

## 2022-01-24 PROBLEM — F10.90 CHRONIC ALCOHOL USE: Status: ACTIVE | Noted: 2022-01-24

## 2022-01-24 LAB
ALBUMIN SERPL-MCNC: 3.5 G/DL (ref 3.5–5.2)
ALP BLD-CCNC: 44 U/L (ref 40–130)
ALT SERPL-CCNC: 10 U/L (ref 5–41)
ANION GAP SERPL CALCULATED.3IONS-SCNC: 11 MMOL/L (ref 7–19)
AST SERPL-CCNC: 17 U/L (ref 5–40)
BASOPHILS ABSOLUTE: 0.1 K/UL (ref 0–0.2)
BASOPHILS RELATIVE PERCENT: 0.8 % (ref 0–1)
BILIRUB SERPL-MCNC: 0.6 MG/DL (ref 0.2–1.2)
BUN BLDV-MCNC: 9 MG/DL (ref 8–23)
CALCIUM SERPL-MCNC: 8.1 MG/DL (ref 8.8–10.2)
CHLORIDE BLD-SCNC: 99 MMOL/L (ref 98–111)
CO2: 23 MMOL/L (ref 22–29)
CREAT SERPL-MCNC: 0.7 MG/DL (ref 0.5–1.2)
EKG P AXIS: 72 DEGREES
EKG P-R INTERVAL: 130 MS
EKG Q-T INTERVAL: 288 MS
EKG QRS DURATION: 88 MS
EKG QTC CALCULATION (BAZETT): 407 MS
EKG T AXIS: 64 DEGREES
EOSINOPHILS ABSOLUTE: 0 K/UL (ref 0–0.6)
EOSINOPHILS RELATIVE PERCENT: 0.5 % (ref 0–5)
GFR AFRICAN AMERICAN: >59
GFR NON-AFRICAN AMERICAN: >60
GLUCOSE BLD-MCNC: 70 MG/DL (ref 74–109)
HCT VFR BLD CALC: 38.5 % (ref 42–52)
HEMOGLOBIN: 12.4 G/DL (ref 14–18)
IMMATURE GRANULOCYTES #: 0 K/UL
LYMPHOCYTES ABSOLUTE: 1 K/UL (ref 1.1–4.5)
LYMPHOCYTES RELATIVE PERCENT: 15.9 % (ref 20–40)
MCH RBC QN AUTO: 31.2 PG (ref 27–31)
MCHC RBC AUTO-ENTMCNC: 32.2 G/DL (ref 33–37)
MCV RBC AUTO: 96.7 FL (ref 80–94)
MONOCYTES ABSOLUTE: 0.7 K/UL (ref 0–0.9)
MONOCYTES RELATIVE PERCENT: 10.5 % (ref 0–10)
NEUTROPHILS ABSOLUTE: 4.7 K/UL (ref 1.5–7.5)
NEUTROPHILS RELATIVE PERCENT: 71.8 % (ref 50–65)
PDW BLD-RTO: 14.5 % (ref 11.5–14.5)
PLATELET # BLD: 222 K/UL (ref 130–400)
PMV BLD AUTO: 9.6 FL (ref 9.4–12.4)
POTASSIUM REFLEX MAGNESIUM: 4.4 MMOL/L (ref 3.5–5)
RBC # BLD: 3.98 M/UL (ref 4.7–6.1)
SODIUM BLD-SCNC: 133 MMOL/L (ref 136–145)
TOTAL CK: 86 U/L (ref 39–308)
TOTAL PROTEIN: 5.2 G/DL (ref 6.6–8.7)
WBC # BLD: 6.5 K/UL (ref 4.8–10.8)

## 2022-01-24 PROCEDURE — 85025 COMPLETE CBC W/AUTO DIFF WBC: CPT

## 2022-01-24 PROCEDURE — 82550 ASSAY OF CK (CPK): CPT

## 2022-01-24 PROCEDURE — 70553 MRI BRAIN STEM W/O & W/DYE: CPT

## 2022-01-24 PROCEDURE — 6360000002 HC RX W HCPCS: Performed by: NURSE PRACTITIONER

## 2022-01-24 PROCEDURE — 36415 COLL VENOUS BLD VENIPUNCTURE: CPT

## 2022-01-24 PROCEDURE — A9577 INJ MULTIHANCE: HCPCS | Performed by: PSYCHIATRY & NEUROLOGY

## 2022-01-24 PROCEDURE — 2580000003 HC RX 258: Performed by: NURSE PRACTITIONER

## 2022-01-24 PROCEDURE — 93010 ELECTROCARDIOGRAM REPORT: CPT | Performed by: INTERNAL MEDICINE

## 2022-01-24 PROCEDURE — 82306 VITAMIN D 25 HYDROXY: CPT

## 2022-01-24 PROCEDURE — 6370000000 HC RX 637 (ALT 250 FOR IP): Performed by: PSYCHIATRY & NEUROLOGY

## 2022-01-24 PROCEDURE — 95816 EEG AWAKE AND DROWSY: CPT

## 2022-01-24 PROCEDURE — 1210000000 HC MED SURG R&B

## 2022-01-24 PROCEDURE — 6370000000 HC RX 637 (ALT 250 FOR IP): Performed by: NURSE PRACTITIONER

## 2022-01-24 PROCEDURE — 80053 COMPREHEN METABOLIC PANEL: CPT

## 2022-01-24 PROCEDURE — 6360000004 HC RX CONTRAST MEDICATION: Performed by: PSYCHIATRY & NEUROLOGY

## 2022-01-24 PROCEDURE — 99223 1ST HOSP IP/OBS HIGH 75: CPT | Performed by: PSYCHIATRY & NEUROLOGY

## 2022-01-24 RX ORDER — DEXTROSE AND SODIUM CHLORIDE 5; .9 G/100ML; G/100ML
INJECTION, SOLUTION INTRAVENOUS CONTINUOUS
Status: DISCONTINUED | OUTPATIENT
Start: 2022-01-24 | End: 2022-01-25 | Stop reason: HOSPADM

## 2022-01-24 RX ORDER — ALBUTEROL SULFATE 90 UG/1
2 AEROSOL, METERED RESPIRATORY (INHALATION) EVERY 6 HOURS PRN
Status: DISCONTINUED | OUTPATIENT
Start: 2022-01-24 | End: 2022-01-24 | Stop reason: CLARIF

## 2022-01-24 RX ORDER — LEVETIRACETAM 500 MG/1
500 TABLET ORAL 2 TIMES DAILY
Status: DISCONTINUED | OUTPATIENT
Start: 2022-01-24 | End: 2022-01-25 | Stop reason: HOSPADM

## 2022-01-24 RX ORDER — ALBUTEROL SULFATE 2.5 MG/3ML
2.5 SOLUTION RESPIRATORY (INHALATION) EVERY 6 HOURS PRN
Status: DISCONTINUED | OUTPATIENT
Start: 2022-01-24 | End: 2022-01-25 | Stop reason: HOSPADM

## 2022-01-24 RX ADMIN — SODIUM CHLORIDE: 9 INJECTION, SOLUTION INTRAVENOUS at 09:11

## 2022-01-24 RX ADMIN — THERA TABS 1 TABLET: TAB at 10:44

## 2022-01-24 RX ADMIN — LEVETIRACETAM 500 MG: 500 TABLET, FILM COATED ORAL at 10:44

## 2022-01-24 RX ADMIN — SODIUM CHLORIDE, PRESERVATIVE FREE 10 ML: 5 INJECTION INTRAVENOUS at 20:50

## 2022-01-24 RX ADMIN — GADOBENATE DIMEGLUMINE 12 ML: 529 INJECTION, SOLUTION INTRAVENOUS at 13:17

## 2022-01-24 RX ADMIN — THIAMINE HYDROCHLORIDE 100 MG: 100 INJECTION, SOLUTION INTRAMUSCULAR; INTRAVENOUS at 10:44

## 2022-01-24 RX ADMIN — LEVETIRACETAM 500 MG: 500 TABLET, FILM COATED ORAL at 20:50

## 2022-01-24 NOTE — CONSULTS
[address] [facility] Neurology Consult  ? ? Patient: Lavon Singer  MR#: [id number]  Account Number: [de-identified]   Room: [facility]3/196-28   YOB: 1955  Date of Progress Note: 1/24/2022  Time of Note 7:51 AM  Attending Physician: Boyd Lopez MD  Consulting Physician: Rico Appiah M.D.  ?  ? CHIEF COMPLAINT: New onset seizures  ? HISTORY OF PRESENT ILLNESS:   This 75 y.o. male who presents with new onset seizures. According to the chart the patient's wife heard a \"grunting noise\" while she was cooking around 4:30 PM on 1/23. She noted that he was confused and drooling at the time. ER physician's note says he was sitting upright watching television and slumped over and went unresponsive and was unable to speak when he woke up and had some right facial droop noted by EMS in route. CT of the head and CTA of the head and neck are unremarkable. While in the ED he had a focal seizure involving the right face which then generalized. He was not felt to be a TPA candidate due to seizures at the onset. Case discussed with ED physician. He was given Ativan and loaded up with Keppra and has been stable overnight. No further seizures. Denies a seizure history. Denies illicit drug use. Says he drinks 5 or 6 beers a day including the day of the event. REVIEW OF SYSTEMS:  Constitutional - No fever or chills. No diaphoresis or significant fatigue. HENT - No tinnitus or significant hearing loss. Eyes - no sudden vision change or eye pain  Respiratory - no significant shortness of breath or cough  Cardiovascular - no chest pain No palpitations or significant leg swelling  Gastrointestinal - no abdominal swelling or pain. Genitourinary - No difficulty urinating, dysuria  Musculoskeletal - no back pain or myalgia. Skin - no color change or rash  Neurologic - No headaches. No lateralizing weakness. Hematologic - no easy bruising or excessive bleeding. Psychiatric - no severe anxiety or nervousness.    All other review of systems are negative. ? Past Medical History:      Diagnosis Date    COPD (chronic obstructive pulmonary disease) ([facility] [address]      Past Surgical History:  History reviewed. No pertinent surgical history. Medications in Hospital:     Current Facility-Administered Medications:     iopamidol (ISOVUE-370) 76 % injection 95 mL, 95 mL, IntraVENous, ONCE PRN, Shazia Sultana MD    0.9 % sodium chloride infusion, , IntraVENous, Continuous, ALIYAH Santiago CNP, Last Rate: 100 mL/hr at 01/23/22 2129, New Bag at 01/23/22 2129    sodium chloride flush 0.9 % injection 5-40 mL, 5-40 mL, IntraVENous, 2 times per day, ALIYAH Santiago - CNP, 10 mL at 01/23/22 2128    sodium chloride flush 0.9 % injection 5-40 mL, 5-40 mL, IntraVENous, PRN, ALIYAH Santiago - CNP    0.9 % sodium chloride infusion, 25 mL, IntraVENous, PRN, ALIYAH Santiago - CNP    thiamine (B-1) injection 100 mg, 100 mg, IntraVENous, Daily, ALIYAH Santiago - CNP, 100 mg at 01/23/22 2127    multivitamin 1 tablet, 1 tablet, Oral, Daily, ALIYAH Santiago - CNP    LORazepam (ATIVAN) tablet 1 mg, 1 mg, Oral, Q1H PRN **OR** LORazepam (ATIVAN) injection 1 mg, 1 mg, IntraVENous, Q1H PRN **OR** LORazepam (ATIVAN) tablet 2 mg, 2 mg, Oral, Q1H PRN **OR** LORazepam (ATIVAN) injection 2 mg, 2 mg, IntraVENous, Q1H PRN **OR** LORazepam (ATIVAN) tablet 3 mg, 3 mg, Oral, Q1H PRN **OR** LORazepam (ATIVAN) injection 3 mg, 3 mg, IntraVENous, Q1H PRN **OR** LORazepam (ATIVAN) tablet 4 mg, 4 mg, Oral, Q1H PRN **OR** LORazepam (ATIVAN) injection 4 mg, 4 mg, IntraVENous, Q1H PRN, Libia Mukherjee APRMAKSIM - CNP  Allergies: Patient has no known allergies. Social History:   TOBACCO:  has no history on file for tobacco use. ETOH:  reports current alcohol use. Family History:   History reviewed. No pertinent family history.   ?  ?  Physical Exam:    Vitals: BP (!) 121/57   Pulse 72   Temp 97.5 °F (36.4 °C) (Temporal)   Resp 16   Ht 5' 6\" (1.676 m)   Wt 139 lb 12.8 oz (63.4 kg)   SpO2 97%   BMI 22.56 kg/m²   Constitutional - well developed, well nourished. Eyes - conjunctiva normal. Pupils react to light  Ear, nose, throat -hearing intact to finger rub No scars, masses, or lesions over external nose or ears, no atrophy of tongue  Neck-symmetric, no masses noted, no jugular vein distension  Respiration- chest wall appears symmetric, good expansion,   normal effort without use of accessory muscles  Musculoskeletal - no significant wasting of muscles noted, no bony deformities  Extremities-no clubbing, cyanosis or edema  Skin - warm, dry, and intact. No rash, erythema, or pallor. Psychiatric - mood, affect, and behavior appear normal.   Neurological exam  Awake, alert, fluent oriented x 3 except that initially he did not know where he was. He was able to retain that information. Able to follow complex commands. Attention and concentration appear appropriate  Recent and remote memory appears unremarkable  Speech normal without dysarthria  No clear issues with language of fund of knowledge  Cranial Nerve Exam   CN II- Visual fields grossly unremarkable  CN III, IV,VI-EOMI, horizontal gaze evoked nystagmus, conjugate eye movements, no ptosis  CN V-sensation intact to LT over face  CN VII-no facial assymetry  CN VIII-Hearing intact to voice  CN IX and X- Palate elevates in midline  CN XI-good shoulder shrug  CN XII-Tongue midline with no fasciculations or fibrillations  Motor Exam  V/V throughout upper and lower extremities bilaterally, no cogwheeling, normal tone  Sensory Exam  Sensation intact to light touch and temperature upper and lower extremities bilaterally  Reflexes   Absent throughout  Tremors- no tremors in hands or head noted  Gait  Not tested  Coordination  Finger to nose-unremarkable  ?   ?  CBC:   Recent Labs     01/23/22 1829 01/24/22  0444   WBC 7.4 6.5   HGB 14.1 12.4*    222     BMP:   Recent Labs     01/23/22  1829 01/23/22  1830 01/24/22  0444   NA 127*  --  133*   K 4.1  --  4.4   CL 90*  --  99   CO2 13*  --  23   BUN 10  --  9   CREATININE 0.8  --  0.7   GLUCOSE 144* 161 70*     Hepatic:   Recent Labs     01/23/22 1829 01/24/22  0444   AST 19 17   ALT 11 10   BILITOT 0.4 0.6   ALKPHOS 53 44     Lipids: No results for input(s): CHOL, HDL in the last 72 hours. Invalid input(s): LDLCALCU  INR:   Recent Labs     01/23/22 1829   INR 1.17     ?  ? Assessment and Plan   1. Appears to be having new onset left hemisphere seizures. CT the head unremarkable. Check MRI of the head with and without along with EEG. Placed on Keppra 500 mg twice a day. Patient wants to leave and go to [facility] to see his mother who is in the hospital.  I have told him he cannot leave today that he is in no shape. Check CPK  ? ?       Nicholas Koroma MD  Board Certified in Neurology

## 2022-01-24 NOTE — PROGRESS NOTES
4 Eyes Skin Assessment    Larisa Bernstein is being assessed upon: Admission    I agree that Nilo Mauro, RN, along with Edna Pathak RN (either 2 RN's or 1 LPN and 1 RN) have performed a thorough Head to Toe Skin Assessment on the patient. ALL assessment sites listed below have been assessed. Areas assessed by both nurses:     [x]   Head, Face, and Ears   [x]   Shoulders, Back, and Chest  [x]   Arms, Elbows, and Hands   [x]   Coccyx, Sacrum, and Ischium  [x]   Legs, Feet, and Heels    Does the Patient have Skin Breakdown?  No    Sebastien Prevention initiated: No  Wound Care Orders initiated: No    WOC nurse consulted for Pressure Injury (Stage 3,4, Unstageable, DTI, NWPT, and Complex wounds) and New or Established Ostomies: No        Primary Nurse eSignature: Yoselyn Snyder RN on 1/23/2022 at 9:38 PM      Co-Signer eSignature: Electronically signed by Farrukh Jordan RN on 1/23/2022 at 9:39 PM

## 2022-01-24 NOTE — PROGRESS NOTES
Pharmacy Consult      Ronan Gonzalez is a 75 y.o. male for whom pharmacy has been consulted to review this patient's medication profile to evaluate IV medications and change all base solutions to 0.9% sodium chloride if possible based on compatibility and product availability. There is no problem list on file for this patient. Allergies:  Patient has no allergy information on record. No results for input(s): CREATININE in the last 72 hours. Ht/Wt:   Ht Readings from Last 1 Encounters:   01/23/22 5' 6\" (1.676 m)        Wt Readings from Last 1 Encounters:   01/23/22 160 lb (72.6 kg)         CrCl cannot be calculated (Patient's most recent lab result is older than the maximum 10 days allowed. ). Assessment/Plan:    No changes needed at this time. Thank you for the consult. Will continue to follow.     Electronically signed by Abbey Corey PharmD on 1/23/2022 at 6:33 PM

## 2022-01-24 NOTE — PROGRESS NOTES
Marcellus Worley arrived to room # 514. Presented with: Stroke-like symptoms  Mental Status: Patient is disoriented and alert. Vitals:    01/23/22 2108   BP: (!) 152/80   Pulse: 92   Resp: 20   Temp: 96.9 °F (36.1 °C)   SpO2: 99%     Patient safety contract and falls prevention contract reviewed with patient Yes. Oriented Patient to room. Call light within reach. Yes.   Needs, issues or concerns expressed at this time: no.      Electronically signed by Rick Britt RN on 1/23/2022 at 9:38 PM

## 2022-01-24 NOTE — CARE COORDINATION
Date / Time of Evaluation:   1/24/2022    2:37 PM  Assessment Completed by:   Miguel A López, RN, BSN      Patient Name:   Lavon Singer  MRN:   [id number]  YOB: 1955    Patient Admission Status:   Inpatient [101]    Patient Contact Information:    [address]  [phone] (home)    [phone] (mobile)  Above information verified? [x]   Yes  []   No    (Best Practice:   Have patient/caregiver verify above address and phone number by stating out loud their current address and reachable phone number. Initial Assessment Completed at bedside with:      [x]   Patient  [x]   Family/Caregiver/Guardian   []   Other:      Current PCP:    Roxanne Merrill    PCP verified? [x]   Yes  []   No    Emergency Contacts:    Extended Emergency Contact Information  Primary Emergency Contact: Lexus Oviedo  Address: [address] [place] [address] José Miguel Butler [address] [place] [address] Phone: [phone]  Relation: Spouse    Advance Directives:    Does Mr. Nelson Nevarez have an advance directive in his electronic medical record? []   Yes  [x]   No    Code Status:   No Order      Have you been vaccinated for COVID-19 (SARS-CoV-2)? []   Yes  [x]   No                   If so, when?     Which :         []   [employer]  []   [employer]  []   [employer]  []   Other:       Do you have any of the following unmet social needs that would keep you from returning home safely:    []   Yes  [x]   No                    Unmet Social Needs:           []   Living Situation/Housing  []   Food  []   Stroke Education   []   Utilities  []   Personal Safety  []   Financial Strain  []   Employment  []   Mental Health  []   Substance Abuse  []   Transportation Barriers    Additional Unmet Social Needs Notes:    NA    Financial:    Payor: Clementine Mccarthy / Plan: Hayes Milligan / Product Type: *No Product type* /     Pre-Cert required for SNF:     [x]   Yes  []   No    Have Long Term Care Insurance:      []   Yes  [x]   No      Pharmacy:  No Pharmacies Listed  Potential assistance purchasing medications? []   Yes  [x]   No      ADLS:       Support System:   Family/friends    Current Home Environment:       Steps:       [x]   Yes  []   No    If yes, how many? 7 with handrail    Plans to RETURN to current housing:     [x]   Yes  []   No    Barriers to RETURNING to current housing:   NA    Currently ACTIVE with Home Health CARE:      []   Yes  [x]   No    Home Health Care Agency:   NA    DME Provider:   NICHOL    Transition Plan:  Home with spouse    Transportation PLAN for Discharge:  spouse    Patient Deficits:    [x]   Yes   []   No    If yes:    []   Confusion/Memory  []   Visual  []   Motor/Sensory         [x]   Right arm         []   Right leg         []   Left arm         []   Left leg  []   Language/Speech         []   Aphasia         [x]   Dysarthria         []   Swallow    NIH Stroke Scale  Interval: Hand-off/Transfer  Level of Consciousness (1a. ): Alert  LOC Questions (1b. ): Answers one correctly  LOC Commands (1c. ): Performs one task correctly  Best Gaze (2. ): (!) Partial gaze palsy  Visual (3. ): (!) Partial hemianopia  Facial Palsy (4. ): (!) Minor paralysis  Motor Arm, Left (5a. ): No drift  Motor Arm, Right (5b. ): No drift  Motor Leg, Left (6a. ): No drift  Motor Leg, Right (6b. ): No drift  Limb Ataxia (7. ): Absent  Sensory (8. ): (!) Mild to Moderate  Best Language (9. ): Mild to moderate aphasia  Dysarthria (10. ): Normal  Extinction and Inattention (11): No abnormality  Total: 7    [place] Coma Scale  Eye Opening: Spontaneous  Best Verbal Response: Confused  Best Motor Response: Obeys commands  Isai Coma Scale Score: 14    Patient Deficit Notes:     Symptoms have resolved    Additional CM/SW Notes:   I spoke with Mr. And . Carmelita Mays. He states that he does not use any equipment at home and does not utilize home healthcare. No Needs identified.  Will continue to follow. Stroke booklet and pamphlets given to patient and spouse. Reviewed, discussed, and answered question. Both state understanding. Booklet/pamphlets given to patient/spouse. Shraddha Valero and/or his family were provided with choice of provider:    []   Yes   [x]   No      [x]   Stroke education booklet reviewed and given to patient/family/caregiver/guardian. All questions answered all questions answered appropriately and efficiently per family.     Enedelia Bernheim, RN, BSN  [facility] [place] [facility]  Phone: [phone]  Fax: [phone]    Electronically signed by Enedelia Bernheim, RN, BSN on 1/24/2022 at 2:44 PM

## 2022-01-24 NOTE — ED NOTES
Celia  CT to tell them patient was coming by EMS  [facility] they were ready for patient. Nurse made aware.      Dmitri Chakraborty  01/23/22 [id number]

## 2022-01-24 NOTE — ED PROVIDER NOTES
[facility] EMERGENCY DEPT  EMERGENCY DEPARTMENT ENCOUNTER      Pt Name: Aydee Pearson  MRN: [id number]  [place] 1955  Date of evaluation: 1/23/2022  Provider: Shirin Parisi MD    CHIEF COMPLAINT       Chief Complaint   Patient presents with    Neurologic Problem     presents to er per ems with c/o neuro deficet, rt facial droop         HISTORY OF PRESENT ILLNESS   (Location/Symptom, Timing/Onset,Context/Setting, Quality, Duration, Modifying Factors, Severity)  Note limiting factors. Aydee Pearson is a 75 y.o. male who presents to the emergency department for evaluation after sudden mental status change. EMS obtained history from wife. She stated that about 15 minutes prior to arrival in the emergency department patient was sitting upright watching television and suddenly went unresponsive and slumped over. When he woke up he was unable to speak. Patient was noted to have right facial droop in route and EMS states his right arm seemed drawn up in route. There was no witnessed seizure activity per report. Patient noted to be profoundly hypertensive in route with EMS. Blood glucose was in the 70s in route. Patient was given an amp of D50 by EMS with improvement in blood glucose but no change in condition. HPI    NursingNotes were reviewed. REVIEW OF SYSTEMS    (2-9 systems for level 4, 10 or more for level 5)     Review of Systems   Unable to perform ROS: Patient nonverbal       A complete review of systems was performed and is negative except as noted above in the HPI. PAST MEDICAL HISTORY     Past Medical History:   Diagnosis Date    COPD (chronic obstructive pulmonary disease) ([facility] [address]          SURGICAL HISTORY     History reviewed. No pertinent surgical history.       CURRENT MEDICATIONS       Previous Medications    ALBUTEROL SULFATE HFA (VENTOLIN HFA) 108 (90 BASE) MCG/ACT INHALER    Inhale 2 puffs into the lungs every 6 hours as needed for Wheezing    BUDESON-GLYCOPYRROL-FORMOTEROL (BREZTRI AEROSPHERE) 160-9-4.8 MCG/ACT AERO    Inhale into the lungs       ALLERGIES     Patient has no known allergies. FAMILY HISTORY     History reviewed. No pertinent family history. SOCIAL HISTORY       Social History     Socioeconomic History    Marital status:      Spouse name: None    Number of children: None    Years of education: None    Highest education level: None   Occupational History    None   Tobacco Use    Smoking status: Unknown If Ever Smoked    Smokeless tobacco: None   Substance and Sexual Activity    Alcohol use: Yes    Drug use: Never    Sexual activity: None   Other Topics Concern    None   Social History Narrative    None     Social Determinants of Health     Financial Resource Strain:     Difficulty of Paying Living Expenses: Not on file   Food Insecurity:     Worried About Running Out of Food in the Last Year: Not on file    Rosie of Food in the Last Year: Not on file   Transportation Needs:     Lack of Transportation (Medical): Not on file    Lack of Transportation (Non-Medical):  Not on file   Physical Activity:     Days of Exercise per Week: Not on file    Minutes of Exercise per Session: Not on file   Stress:     Feeling of Stress : Not on file   Social Connections:     Frequency of Communication with Friends and Family: Not on file    Frequency of Social Gatherings with Friends and Family: Not on file    Attends Taoism Services: Not on file    Active Member of [address] [place] [address] [employer] or Organizations: Not on file    Attends Club or Organization Meetings: Not on file    Marital Status: Not on file   Intimate Partner Violence:     Fear of Current or Ex-Partner: Not on file    Emotionally Abused: Not on file    Physically Abused: Not on file    Sexually Abused: Not on file   Housing Stability:     Unable to Pay for Housing in the Last Year: Not on file    Number of [place] in the Last Year: Not on file    Unstable Housing in the Last Year: Not on file SCREENINGS   NIH Stroke Scale  Interval: Baseline  Level of Consciousness (1a. ): Responds only with reflex motor or autonomic effects or totally unresponsive  LOC Questions (1b. ): Answers neither question correctly  LOC Commands (1c. ): Performs neither task correctly  Best Gaze (2. ): (!) Partial gaze palsy  Visual (3. ): (!) Partial hemianopia  Facial Palsy (4. ): (!) Minor paralysis  Motor Arm, Left (5a. ): No drift  Motor Arm, Right (5b. ): No drift  Motor Leg, Left (6a. ): No drift  Motor Leg, Right (6b. ): No drift  Limb Ataxia (7. ): Absent  Sensory (8. ): (!) Mild to Moderate  Best Language (9. ): Mild to moderate aphasia  Dysarthria (10. ): Normal  Extinction and Inattention (11): No abnormality  Total: 11         PHYSICAL EXAM    (up to 7 for level 4, 8 or more for level 5)     ED Triage Vitals [01/23/22 1805]   BP Temp Temp src Pulse Resp SpO2 Height Weight   -- -- -- -- -- -- 5' 6\" (1.676 m) 160 lb (72.6 kg)       Physical Exam  Vitals and nursing note reviewed. Constitutional:       General: He is not in acute distress. Appearance: He is well-developed. He is not toxic-appearing or diaphoretic. HENT:      Head: Normocephalic and atraumatic. Eyes:      General: No scleral icterus. Right eye: No discharge. Left eye: No discharge. Pupils: Pupils are equal, round, and reactive to light. Cardiovascular:      Rate and Rhythm: Normal rate and regular rhythm. Heart sounds: Normal heart sounds. Pulmonary:      Effort: Pulmonary effort is normal. No respiratory distress. Breath sounds: No stridor. No wheezing or rhonchi. Abdominal:      General: There is no distension. Palpations: Abdomen is soft. Tenderness: There is no abdominal tenderness. Musculoskeletal:         General: No deformity. Normal range of motion. Cervical back: Normal range of motion. Skin:     General: Skin is warm and dry.    Neurological:      Mental Status: He is alert and oriented to person, place, and time. GCS: GCS eye subscore is 4. GCS verbal subscore is 1. GCS motor subscore is 6. Motor: Abnormal muscle tone present. No weakness. Coordination: Romberg sign negative. Coordination normal.      Gait: Gait normal.      Comments: There is drooping of the right side of the face. Seems to be equal strength to bilateral upper extremities no patient has difficulty concentrating and following commands. Has some rigidity similar to wax form rigidity in whichhe will keep extremities and positions as they are placed for several seconds before dropping them down. Psychiatric:         Mood and Affect: Mood normal.         Speech: Speech normal.         Behavior: Behavior normal.         Thought Content: Thought content normal.         Judgment: Judgment normal.         DIAGNOSTIC RESULTS     EKG: All EKG's are interpreted by the Emergency Department Physician who either signs or Co-signs this chart in the absence of a cardiologist.        RADIOLOGY:   Non-plain film images such as CT, Ultrasound and MRI are read by the radiologist. Gurjit Vasu images are visualized and preliminarily interpreted by the emergency physician with the below findings:        Interpretation per the Radiologist below, if available at the time of this note:    XR CHEST PORTABLE   Final Result   Mild atelectasis in the left base. Otherwise unremarkable. Signed by Dr Myra Nelson   Final Result   1. No flow-limiting stenosis or arterial occlusion in the neck. No ICA   narrowing according to the NASCET assessment. 2. No intracranial flow-limiting stenosis or large vessel occlusion. Signed by Dr Chevy Tipton   Final Result   1. No acute intracranial process.    Signed by Dr Cindy Baeza            ED BEDSIDE ULTRASOUND:   Performed by ED Physician - none    LABS:  Labs Reviewed   CBC WITH AUTO DIFFERENTIAL - Abnormal; Notable for tablet 3 mg (has no administration in time range)     Or   LORazepam (ATIVAN) injection 3 mg (has no administration in time range)     Or   LORazepam (ATIVAN) tablet 4 mg (has no administration in time range)     Or   LORazepam (ATIVAN) injection 4 mg (has no administration in time range)   LORazepam (ATIVAN) injection 2 mg (2 mg IntraVENous Given 1/23/22 1812)   levETIRAcetam (KEPPRA) 1000 mg/100 mL IVPB (0 mg IntraVENous Stopped 1/23/22 1856)   lactated ringers bolus (1,000 mLs IntraVENous New Bag 1/23/22 1912)       EMERGENCY DEPARTMENT COURSE and DIFFERENTIALDIAGNOSIS/MDM:   Vitals:    Vitals:    01/23/22 1808 01/23/22 1830 01/23/22 1931 01/23/22 2001   BP:   (!) 154/85 139/75   Pulse:   99 92   Resp:   20 15   Temp:  98.2 °F (36.8 °C)     SpO2:   100% 100%   Weight: 160 lb (72.6 kg)      Height: 5' 6\" (1.676 m)          MDM  Number of Diagnoses or Management Options  Altered mental status, unspecified altered mental status type: new and requires workup  Facial droop: new and requires workup  New onset seizure [facility]): new and requires workup     Amount and/or Complexity of Data Reviewed  Clinical lab tests: ordered and reviewed  Tests in the radiology section of CPT®: ordered and reviewed  Tests in the medicine section of CPT®: ordered and reviewed  Obtain history from someone other than the patient: yes  Review and summarize past medical records: yes  Discuss the patient with other providers: yes  Independent visualization of images, tracings, or specimens: yes    Risk of Complications, Morbidity, and/or Mortality  Presenting problems: high  Diagnostic procedures: high  Management options: high    Critical Care  Total time providing critical care: 30-74 minutes    Patient Progress  Patient progress: stable      ED Course as of 01/23/22 2059   Sun Jan 23, 2022   1825 On initial presentation patient with leftward gaze deviation and seemed to have some right-sided neglect however this resolved shortly afterwards and patient was then favoring his right side with rightward gaze deviation. Stroke alert was activated on patient arrival and orders were placed. While awaiting transport to CT patient started having seizure activity. Initially seem to involve just the right side of the face and then became more generalized. Patient did bite his tongue and became hypoxic during his episode. Airway was suctioned and bag-valve-mask ventilation was performed briefly. Patient was given Ativan which resolved seizure activity. Patient was then taken emergently for CT scans [APPLE]   1837 EKG shows sinus rhythm with a rate of 128. No findings of acute ischemia or infarction. Overall normal EKG other than tachycardia [APPLE]   1936 Patient reevaluated at this time. Still has not had significant improvement in mental status. He is maintaining respiratory function but will not follow commands or communicate. At this time patient may still be postictal still difficult to determine by presentation whether patient may have had an initial stroke or whether her presentation is entirely due to seizure activity. [APPLE]      ED Course User Index  [APPLE] Severiano Buster., MD     Based on the evaluation and work-up here patient is felt to require further monitoring, work-up, or treatment that is available in the emergency department. Case was discussed with hospitalist who agrees for observation or admission for further management. Treatment and stabilization as necessary were provided in the emergency department prior to transfer of care to the medicine service. CONSULTS:  IP CONSULT TO PHARMACY  PHARMACY TO CHANGE BASE FLUIDS  IP CONSULT TO NEUROLOGY  IP CONSULT TO SOCIAL WORK    PROCEDURES:  Unless otherwise notedbelow, none     Procedures  CRITICAL CARE TIME   Total Critical Care time was 45 minutes, excluding separately reportable procedures.   There was a high probability of clinically significant/life threatening deterioration in the patient's condition which required my urgent intervention. FINAL IMPRESSION     1. New onset seizure ([address]    2. Facial droop    3. Altered mental status, unspecified altered mental status type          DISPOSITION/PLAN   DISPOSITION        PATIENT REFERRED TO:  No follow-up provider specified.     DISCHARGE MEDICATIONS:  New Prescriptions    No medications on file          (Please note that portions of this note were completed with a voice recognition program.  Efforts were made to edit the dictations butoccasionally words are mis-transcribed.)    Indra Harrison MD (electronically signed)  AttendingEmerOzark Health Medical Centercy Physician          Indra Harrison., MD  01/23/22 2299

## 2022-01-24 NOTE — PROGRESS NOTES
Patient passed bedside swallow evaluation.   Electronically signed by Trevon Berg RN on 1/24/2022 at 9:26 AM

## 2022-01-24 NOTE — H&P
Dmitri Mack - History & Physical      PCP: No primary care provider on file. Date of Admission: 1/23/2022    Date of Service: 1/24/2022    Chief Complaint: stroke like symptoms    History Of Present Illness: The patient is a 75 y.o. male who presented to [facility] ED by EMS due to stroke like symptoms. Pt is unable to provide history. Pt's history of present illness was obtained from his wife who relates that pt made a \"grunting\" noise while she was looking for a cooking recipe around 430pm. She relates that she noticed patient was not responding to her however awake, confused appearing trying to stand up. He was drooling out the left side of his mouth. He did not sustain fall. He has had no fevers or recent illness. He has no history of prior seizures. He drinks etoh daily 5-6 beers. He has been drinking today. He has not had prior alcohol withdrawal seizures. In ED, code stroke work up initiated, neurology was consulted, he was given Keppra 1GM, Ativan 2mg for seizure like activity and 1L of LR fluid bolus. CTA head/neck=> No flow-limiting stenosis or arterial occlusion in the neck. No ICA narrowing according to the NASCET assessment. 2. No intracranial flow-limiting stenosis or large vessel occlusion  CT of head without=> No acute intracranial process. CXR=>Mild atelectasis in the left base. Otherwise unremarkable. Covid negative, Sodium 127, potassium 4.1, CO2 13, Creatinine 0.8, etoh 16, troponin negative. ETOH 16   Past Medical History:        Diagnosis Date    COPD (chronic obstructive pulmonary disease) ([facility] [address]        Past Surgical History:    History reviewed. No pertinent surgical history. Home Medications:  Prior to Admission medications    Medication Sig Start Date End Date Taking?  Authorizing Provider   albuterol sulfate HFA (VENTOLIN HFA) 108 (90 Base) MCG/ACT inhaler Inhale 2 puffs into the lungs every 6 hours as needed for Wheezing   Yes Historical Provider, MD Budeson-Glycopyrrol-Formoterol (BREZTRSCRMPHERE) 160-9-4.8 MCG/ACT AERO Inhale into the lungs   Yes Historical Provider, MD       Allergies:    Patient has no known allergies. Social History:    The patient currently lives wife  Tobacco:   has no history on file for tobacco use. Alcohol:   reports current alcohol use. Illicit Drugs: none per wife    Family History:  History reviewed. No pertinent family history. Review of Systems:   Review of Systems   Unable to perform ROS: Mental status change        14 point review of systems is negative except as specifically addressed above. Physical Examination:  BP (!) 152/80   Pulse 92   Temp 96.9 °F (36.1 °C) (Temporal)   Resp 20   Ht 5' 6\" (1.676 m)   Wt 139 lb 12.8 oz (63.4 kg)   SpO2 99%   BMI 22.56 kg/m²   Physical Exam  Vitals reviewed. Constitutional:       Comments: Pt drowsy, opens eyes to voice, follows commands intermittently   HENT:      Head: Normocephalic. Right Ear: External ear normal.      Left Ear: External ear normal.   Eyes:      Conjunctiva/sclera: Conjunctivae normal.      Pupils: Pupils are equal, round, and reactive to light. Cardiovascular:      Rate and Rhythm: Normal rate and regular rhythm. Heart sounds: Normal heart sounds. Pulmonary:      Effort: Pulmonary effort is normal.      Breath sounds: Normal breath sounds. Abdominal:      General: Bowel sounds are normal.      Palpations: Abdomen is soft. Tenderness: There is no abdominal tenderness. Musculoskeletal:         General: Normal range of motion. Cervical back: Normal range of motion. Comments: Moves all 4 extremities weakly/intermittently    Skin:     General: Skin is warm and dry. Neurological:      Mental Status: He is alert.       Comments: Able to tell me his name  Unable to cooperate with exam          Diagnostic Data:  CBC:  Recent Labs     01/23/22  1829   WBC 7.4   HGB 14.1   HCT 45.2        BMP:  Recent Labs 01/23/22  1829   *   K 4.1   CL 90*   CO2 13*   BUN 10   CREATININE 0.8   CALCIUM 8.6*     Recent Labs     01/23/22 1829   AST 19   ALT 11   BILITOT 0.4   ALKPHOS 53     Coag Panel:   Recent Labs     01/23/22 1829   INR 1.17   PROTIME 15.1*     Cardiac Enzymes:   Recent Labs     01/23/22 1829   TROPONINI <0.01       CT HEAD WO CONTRAST    Result Date: 1/23/2022  CT HEAD WO CONTRAST 1/23/2022 6:11 PM HISTORY: Stroke symptoms COMPARISON: None DOSE LENGTH PRODUCT: 729 mGy cm TECHNIQUE: Helical tomographic images of the brain were obtained without the use of intravenous contrast. Automated exposure control was also utilized to decrease patient radiation dose. FINDINGS: There is no evidence of evolving large vascular territory infarct. Old right basal ganglia lacunar infarct. No visualized intra-axial or extra-axial hemorrhage. No mass lesion is identified. Normal size and configuration of the ventricular system. The basal cisterns are symmetric. Posterior fossa structures are unremarkable. The included orbits and their contents are unremarkable. The visualized paranasal sinuses, mastoid air cells and middle ear cavities are clear. The visualized osseous structures and overlying soft tissues of the skull and face are unremarkable. 1. No acute intracranial process. Signed by Dr Ruth Vázquez    Result Date: 1/23/2022  XR CHEST PORTABLE 1/23/2022 7:05 PM HISTORY: Code stroke  Technique: Single AP view of the chest COMPARISONS: Chest x-ray dated 6/15/2012 FINDINGS: Mild atelectasis in the left base. No pleural effusion or pneumothorax. Cardiomediastinal silhouette and pulmonary vasculature are unremarkable. No acute bony abnormality. Mild atelectasis in the left base. Otherwise unremarkable.  Signed by Dr Yury Arias    Result Date: 1/23/2022  CTA HEAD NECK W CONTRAST 1/23/2022 6:11 PM HISTORY: Stroke symptoms COMPARISON: None DLP: 1449 mGy cm TECHNIQUE: Following the uneventful administration of Isovue contrast, serial helical tomographic images of the head and neck were obtained following angiogram protocol. Multiplanar MIP and 3D reconstructions were provided for review. Automated exposure control was also utilized to decrease patient radiation dose. FINDINGS: ANGIOGRAM: Three-vessel branching pattern off the arch. No flow-limiting stenosis identified along the common carotid arteries. Mild calcified plaque at the carotid bifurcations although there is no evidence of flow-limiting stenosis according to the NASCET assessment. Internal carotid arteries are normal in caliber. Vertebral arteries are narrow in caliber, codominant and patent. Distal ICAs are patent and normal in caliber without flow-limiting stenosis. Bilateral MCA branching is patent and symmetric. Anterior cerebral arteries are unremarkable. Intracranial vertebral arteries and basilar artery are small in caliber due to bilateral posterior communicating arteries. Posterior cerebral arteries are patent and normal in caliber. No intracranial hemorrhage or mass effect. Lung apices are clear. Airways patent. 1. No flow-limiting stenosis or arterial occlusion in the neck. No ICA narrowing according to the NASCET assessment. 2. No intracranial flow-limiting stenosis or large vessel occlusion. Signed by Dr Amilcar Diaz      Assessment/Plan:  Principal Problem:    Stroke-like symptoms  Active Problems:    Alcohol abuse, daily use    Hyponatremia    COPD (chronic obstructive pulmonary disease) ([facility] [address]  Resolved Problems:    * No resolved hospital problems.  *     Principal Problem:    Stroke-like symptoms   -neurology consulted   -neurochecks ever 4 hrs   -monitor  Active Problems:    Alcohol abuse, daily use   -[facility] protocol   -monitor telemetry   -MVI, thiamine   -seizure precautions   -social service consult    Hyponatremia   -rehydrate   -NS at 100cc/hr   -monitor electrolytes   -monitor I's and O's   -daily weights    COPD (chronic obstructive pulmonary disease) ([facility])   -bronchodilators   -monitor for increasing supplemental oxygen requirements     Resolved Problems:    * No resolved hospital problems.  *  Signed:  ALIYAH Mc CNP, 1/24/2022 12:30 AM

## 2022-01-25 VITALS
HEIGHT: 66 IN | TEMPERATURE: 97.5 F | SYSTOLIC BLOOD PRESSURE: 147 MMHG | OXYGEN SATURATION: 97 % | BODY MASS INDEX: 22.47 KG/M2 | WEIGHT: 139.8 LBS | HEART RATE: 83 BPM | RESPIRATION RATE: 20 BRPM | DIASTOLIC BLOOD PRESSURE: 76 MMHG

## 2022-01-25 LAB
AMPHETAMINE SCREEN, URINE: NEGATIVE
ANION GAP SERPL CALCULATED.3IONS-SCNC: 19 MMOL/L (ref 7–19)
BARBITURATE SCREEN URINE: NEGATIVE
BASOPHILS ABSOLUTE: 0.1 K/UL (ref 0–0.2)
BASOPHILS RELATIVE PERCENT: 1.2 % (ref 0–1)
BENZODIAZEPINE SCREEN, URINE: NEGATIVE
BILIRUBIN URINE: NEGATIVE
BLOOD, URINE: NEGATIVE
BUN BLDV-MCNC: 11 MG/DL (ref 8–23)
CALCIUM SERPL-MCNC: 8.5 MG/DL (ref 8.8–10.2)
CANNABINOID SCREEN URINE: NEGATIVE
CHLORIDE BLD-SCNC: 99 MMOL/L (ref 98–111)
CLARITY: ABNORMAL
CO2: 18 MMOL/L (ref 22–29)
COCAINE METABOLITE SCREEN URINE: NEGATIVE
COLOR: YELLOW
CREAT SERPL-MCNC: 0.8 MG/DL (ref 0.5–1.2)
EOSINOPHILS ABSOLUTE: 0.1 K/UL (ref 0–0.6)
EOSINOPHILS RELATIVE PERCENT: 1.5 % (ref 0–5)
ETHANOL: <10 MG/DL (ref 0–0.08)
GFR AFRICAN AMERICAN: >59
GFR NON-AFRICAN AMERICAN: >60
GLUCOSE BLD-MCNC: 59 MG/DL (ref 74–109)
GLUCOSE URINE: NEGATIVE MG/DL
HCT VFR BLD CALC: 40.6 % (ref 42–52)
HEMOGLOBIN: 13.1 G/DL (ref 14–18)
IMMATURE GRANULOCYTES #: 0 K/UL
KETONES, URINE: 40 MG/DL
LEUKOCYTE ESTERASE, URINE: NEGATIVE
LYMPHOCYTES ABSOLUTE: 1.2 K/UL (ref 1.1–4.5)
LYMPHOCYTES RELATIVE PERCENT: 20.9 % (ref 20–40)
Lab: NORMAL
MAGNESIUM: 2.1 MG/DL (ref 1.6–2.4)
MCH RBC QN AUTO: 31 PG (ref 27–31)
MCHC RBC AUTO-ENTMCNC: 32.3 G/DL (ref 33–37)
MCV RBC AUTO: 96 FL (ref 80–94)
MONOCYTES ABSOLUTE: 0.7 K/UL (ref 0–0.9)
MONOCYTES RELATIVE PERCENT: 11 % (ref 0–10)
NEUTROPHILS ABSOLUTE: 3.9 K/UL (ref 1.5–7.5)
NEUTROPHILS RELATIVE PERCENT: 65.1 % (ref 50–65)
NITRITE, URINE: NEGATIVE
OPIATE SCREEN URINE: NEGATIVE
PDW BLD-RTO: 14.6 % (ref 11.5–14.5)
PH UA: 5 (ref 5–8)
PHOSPHORUS: 2.4 MG/DL (ref 2.5–4.5)
PLATELET # BLD: 242 K/UL (ref 130–400)
PMV BLD AUTO: 9.4 FL (ref 9.4–12.4)
POTASSIUM SERPL-SCNC: 4 MMOL/L (ref 3.5–5)
PROTEIN UA: NEGATIVE MG/DL
RBC # BLD: 4.23 M/UL (ref 4.7–6.1)
SODIUM BLD-SCNC: 136 MMOL/L (ref 136–145)
SPECIFIC GRAVITY UA: 1.02 (ref 1–1.03)
TSH REFLEX FT4: 1.97 UIU/ML (ref 0.35–5.5)
UROBILINOGEN, URINE: 0.2 E.U./DL
VITAMIN B-12: 517 PG/ML (ref 211–946)
VITAMIN D 25-HYDROXY: 5.6 NG/ML
WBC # BLD: 5.9 K/UL (ref 4.8–10.8)

## 2022-01-25 PROCEDURE — 84443 ASSAY THYROID STIM HORMONE: CPT

## 2022-01-25 PROCEDURE — 83735 ASSAY OF MAGNESIUM: CPT

## 2022-01-25 PROCEDURE — 84100 ASSAY OF PHOSPHORUS: CPT

## 2022-01-25 PROCEDURE — 81003 URINALYSIS AUTO W/O SCOPE: CPT

## 2022-01-25 PROCEDURE — 6370000000 HC RX 637 (ALT 250 FOR IP): Performed by: NURSE PRACTITIONER

## 2022-01-25 PROCEDURE — 80048 BASIC METABOLIC PNL TOTAL CA: CPT

## 2022-01-25 PROCEDURE — 2580000003 HC RX 258: Performed by: HOSPITALIST

## 2022-01-25 PROCEDURE — 99232 SBSQ HOSP IP/OBS MODERATE 35: CPT | Performed by: PSYCHIATRY & NEUROLOGY

## 2022-01-25 PROCEDURE — 85025 COMPLETE CBC W/AUTO DIFF WBC: CPT

## 2022-01-25 PROCEDURE — 80307 DRUG TEST PRSMV CHEM ANLYZR: CPT

## 2022-01-25 PROCEDURE — 6370000000 HC RX 637 (ALT 250 FOR IP): Performed by: PSYCHIATRY & NEUROLOGY

## 2022-01-25 PROCEDURE — 6360000002 HC RX W HCPCS: Performed by: NURSE PRACTITIONER

## 2022-01-25 PROCEDURE — 82077 ASSAY SPEC XCP UR&BREATH IA: CPT

## 2022-01-25 PROCEDURE — 82607 VITAMIN B-12: CPT

## 2022-01-25 RX ORDER — LEVETIRACETAM 500 MG/1
500 TABLET ORAL 2 TIMES DAILY
Qty: 60 TABLET | Refills: 0 | Status: SHIPPED | OUTPATIENT
Start: 2022-01-25 | End: 2022-04-17 | Stop reason: ALTCHOICE

## 2022-01-25 RX ADMIN — DEXTROSE AND SODIUM CHLORIDE: 5; 900 INJECTION, SOLUTION INTRAVENOUS at 05:30

## 2022-01-25 RX ADMIN — THIAMINE HYDROCHLORIDE 100 MG: 100 INJECTION, SOLUTION INTRAMUSCULAR; INTRAVENOUS at 10:04

## 2022-01-25 RX ADMIN — LEVETIRACETAM 500 MG: 500 TABLET, FILM COATED ORAL at 08:00

## 2022-01-25 RX ADMIN — THERA TABS 1 TABLET: TAB at 08:00

## 2022-01-25 ASSESSMENT — PAIN SCALES - GENERAL: PAINLEVEL_OUTOF10: 0

## 2022-01-25 NOTE — PROGRESS NOTES
22 gauge IV inserted in his left AC. There was was attempt to place the IV. It was flushed with normal saline and there was brisk blood return when drawn back. A transparent dressing has been placed over the insertion site to secure the IV in place. He remains in bed with his call light in reach, tv on and lights off.

## 2022-01-25 NOTE — PROGRESS NOTES
[place], [place], [address]    DEPARTMENT OF HOSPITALIST MEDICINE      PROGRESS  NOTE:    NOTE: Portions of this note have been copied forward, however, changed to reflect the most current clinical status of this patient. Patient:  Fang Ash  YOB: 1955  Date of Service: 1/24/2022  MRN: [id number]   Acct: [de-identified]   Primary Care Physician: No primary care provider on file. Advance Directive: No Order  Admit Date: 1/23/2022       Hospital Day: 1      CHIEF COMPLAINT:  Chief Complaint   Patient presents with    Neurologic Problem     presents to er per ems with c/o neuro deficet, rt facial droop       SUBJECTIVE:  Patient seen and examined at the bedside. He wants to go see his mom in [facility] as she is in hospice and wants to drive himself! [address]  COURSE:    01/24/2022:  Patient is undergoing work-up. EEG was done the results of which are pending. MRI of the brain was done which showed remote right basal ganglia infarct. Patient wants to drive to [facility] to see his mom. I advised him that is very dangerous for him to travel especially when he is driving himself and has to be cleared by neurology for discharge!      01/23/2022: History Of Present Illness: The patient is a 75 y.o. male who presented to [facility] ED by EMS due to stroke like symptoms. Pt is unable to provide history. Pt's history of present illness was obtained from his wife who relates that pt made a \"grunting\" noise while she was looking for a cooking recipe around 430pm. She relates that she noticed patient was not responding to her however awake, confused appearing trying to stand up. He was drooling out the left side of his mouth. He did not sustain fall. He has had no fevers or recent illness. He has no history of prior seizures. He drinks etoh daily 5-6 beers. He has been drinking today.  He has not had prior alcohol withdrawal seizures.      In ED, code stroke work up initiated, neurology was consulted, he was given Keppra 1GM, Ativan 2mg for seizure like activity and 1L of LR fluid bolus. CTA head/neck=> No flow-limiting stenosis or arterial occlusion in the neck. No ICA narrowing according to the NASCET assessment. 2. No intracranial flow-limiting stenosis or large vessel occlusion  CT of head without=> No acute intracranial process. CXR=>Mild atelectasis in the left base. Otherwise unremarkable. Covid negative, Sodium 127, potassium 4.1, CO2 13, Creatinine 0.8, etoh 16, troponin negative. ETOH 16       REVIEW  OF  SYSTEMS:    Constitutional:  No fevers, chills, nausea, vomiting, + tiredness and fatigue   Lungs:   No hemoptysis, pleurisy, cough, SOB   Heart:  No chest pressure with exertion, palpitations,    Abdomen:   No new masses, no bright red blood per rectum   Extremities: No acute pain while ambulating, no new lesions   Neurologic: No new motor or sensory changes     14 point review of systems addressed with patient which is essentially negative except as specifically addressed above:    PAST MEDICAL HISTORY:  Past Medical History:   Diagnosis Date    COPD (chronic obstructive pulmonary disease) ([facility] [address]          PAST SURGICAL HISTORY:  History reviewed. No pertinent surgical history. FAMILY HISTORY:  History reviewed. No pertinent family history. OBJECTIVE:  BP (!) 145/79   Pulse 73   Temp 97.3 °F (36.3 °C) (Temporal)   Resp 16   Ht 5' 6\" (1.676 m)   Wt 139 lb 12.8 oz (63.4 kg)   SpO2 99%   BMI 22.56 kg/m²   No intake/output data recorded.     PHYSICAL  EXAMINATION:    IVET:  Awake, alert, oriented x 3, patient appears tired and fatigued   Head/Eyes:  Normocephalic, atraumatic, EOMI and PERRLA bilaterally   Respiratory:   Bilateral decreased air entry in both lung fields, mild B/L crackles, symmetric expansion of chest   Cardiovascular:  Regular rate and rhythm, S1+S2+0, no murmurs/rubs   Abdomen:   Soft, non-tender, bowel sounds +ve, no organomegaly   Extremities: Moves all, full range of motion, no edema   Neurologic: Awake, alert, oriented x 3, cranial nerves II-XII intact, no focal neurological deficits, sensory system intact   Psychiatric: Normal mood, non-suicidal       CURRENT MEDICATIONS:  Scheduled:   levETIRAcetam  500 mg Oral BID    sodium chloride flush  5-40 mL IntraVENous 2 times per day    thiamine  100 mg IntraVENous Daily    multivitamin  1 tablet Oral Daily        PRN:  albuterol, 2.5 mg, Q6H PRN  iopamidol, 95 mL, ONCE PRN  sodium chloride flush, 5-40 mL, PRN  sodium chloride, 25 mL, PRN  LORazepam, 1 mg, Q1H PRN   Or  LORazepam, 1 mg, Q1H PRN   Or  LORazepam, 2 mg, Q1H PRN   Or  LORazepam, 2 mg, Q1H PRN   Or  LORazepam, 3 mg, Q1H PRN   Or  LORazepam, 3 mg, Q1H PRN   Or  LORazepam, 4 mg, Q1H PRN   Or  LORazepam, 4 mg, Q1H PRN        Infusions:   dextrose 5 % and 0.9 % NaCl      sodium chloride         Laboratory Data:  Recent Labs     01/23/22 1829 01/24/22  0444   WBC 7.4 6.5   HGB 14.1 12.4*    222     Recent Labs     01/23/22 1829 01/23/22  1830 01/24/22  0444   *  --  133*   K 4.1  --  4.4   CL 90*  --  99   CO2 13*  --  23   BUN 10  --  9   CREATININE 0.8  --  0.7   GLUCOSE 144* 161 70*     Recent Labs     01/23/22 1829 01/24/22  0444   AST 19 17   ALT 11 10   BILITOT 0.4 0.6   ALKPHOS 53 44     Troponin T:   Recent Labs     01/23/22 1829   TROPONINI <0.01     Pro-BNP: No results for input(s): BNP in the last 72 hours. INR:   Recent Labs     01/23/22 1829   INR 1.17     UA:No results for input(s): NITRITE, COLORU, PHUR, LABCAST, WBCUA, RBCUA, MUCUS, TRICHOMONAS, YEAST, BACTERIA, CLARITYU, SPECGRAV, LEUKOCYTESUR, UROBILINOGEN, BILIRUBINUR, BLOODU, GLUCOSEU, AMORPHOUS in the last 72 hours. Invalid input(s): Khushbu Soda  A1C: No results for input(s): LABA1C in the last 72 hours.   ABG:No results for input(s): PHART, EYX2OBM, PO2ART, BGA8RNM, BEART, HGBAE, X0BEMKAJ, CARBOXHGBART in the last 72 hours.      Imaging:    CT HEAD WO CONTRAST    Result Date: 1/23/2022  1. No acute intracranial process. Signed by Dr Michael Vazquez    XR CHEST PORTABLE    Result Date: 1/23/2022  Mild atelectasis in the left base. Otherwise unremarkable. Signed by Dr Rocio Browning    Result Date: 1/23/2022  1. No flow-limiting stenosis or arterial occlusion in the neck. No ICA narrowing according to the NASCET assessment. 2. No intracranial flow-limiting stenosis or large vessel occlusion. Signed by Dr Michael Vazquez    MRI C/ [address]    Result Date: 1/24/2022  1. No acute intracranial abnormalities. 2.  Remote right basal ganglia infarct. Signed by Dr Gómez Herb:    Principal Problem:    Stroke-like symptoms  Active Problems:    Alcohol abuse, daily use    Hyponatremia    COPD (chronic obstructive pulmonary disease) (HCC)    Hypoglycemia  Resolved Problems:    * No resolved hospital problems. *      Continue with current medications  Patient passed swallow evaluation and started on diet  Continuous cardiac telemetry monitoring  Check TSH and vitamin B12 levels  Bilateral carotid artery duplex ordered  MRI of the brain without and without contrast and CTA head and neck with contrast reviewed which are within normal limits  EEG was done the course of which are pending  Neurovascular checks ordered every 4 hours as per TIA/stroke protocol  PT/ST/OT evaluation ordered as per TIA/stroke protocol  Patient's renal functions are stable  Sodium level is slowly improving on gentle IV hydration   Patient blood sugar levels are borderline at 70 hence patient switched to D5 normal saline  Advised patient regarding regular diet and food intake  Follow-up closely with neurology recommendations  Continue with CIWA protocol for possible alcohol withdrawal  DC planning once cleared by neurology      Chronic medical issues . .. Continue with home meds.  Monitor patient closely while admitted. Advised very close f/u with patient's PCP as an outpatient to address chronic medical issues. Repeat labs in a.m. Electrolyte replacement as per protocol. Patient will be monitored very closely on the floor. Further recommendations as per the hospital course. Discharge Plan: DC planning in a.m. once patient is evaluated and cleared for discharge by neurology. Permission regarding driving to [place] by himself to see his mom who is in the hospital under hospice care, as per neurology discretion. Patient  is on DVT prophylaxis  Current medications reviewed  Lab work reviewed  Radiology/Chest x-ray films reviewed  Treatment recommendations from suspecialities reviewed, appreciated and agreed with  Discussed with the nurse and addressed all questions/concerns  Discussed with Patient and/or Family at the bedside in detail . .. they understand and agree with the management plan. Jeannine Malone MD  1/24/2022 9:53 PM      DISCLAIMER: This note was created with electronic voice recognition which does have occasional errors. If you have any questions regarding the content within the note please do not hesitate to contact me. .. Thanks.

## 2022-01-25 NOTE — PROGRESS NOTES
Discharged to go home. Patient advised not to drive until cleared by neurologist. Printed prescription for Keppra given to patient.

## 2022-01-25 NOTE — PROCEDURES
[facility]                          ELECTROENCEPHALOGRAM REPORT    PATIENT NAME: Brooklyn Goode                      :        1955  MED REC NO:   [id number]                              ROOM:       [facility]  ACCOUNT NO:   [de-identified]                           ADMIT DATE: 2022  PROVIDER:     Sis Ott MD    DATE OF EE2022  Indication for test: seizure  SUMMARY:  The waking background consists of a rhythmic and symmetric  well-formed and well-regulated posterior dominant 8-9 Hz activity. Frequent beta activity is noted bilaterally in the central temporal  regions. No focal slowing nor any epileptiform activity is seen. Photic stimulation produced no abnormalities. No epileptiform  discharges nor any focal lateralizing slowing was noted. IMPRESSION:  Normal awake and drowsy appearing EEG with excessive beta  activity of uncertain significance. Correlate clinically.         Alma Shafer MD    D: 2022 9:15:11      T: 2022 9:18:55     [email or web address]  Job#: [id number]     Doc#: [id number]    CC:

## 2022-01-25 NOTE — DISCHARGE INSTR - DIET

## 2022-01-25 NOTE — PLAN OF CARE
Problem: HEMODYNAMIC STATUS  Goal: Patient has stable vital signs and fluid balance  Outcome: Completed     Problem: ACTIVITY INTOLERANCE/IMPAIRED MOBILITY  Goal: Mobility/activity is maintained at optimum level for patient  Outcome: Completed     Problem: COMMUNICATION IMPAIRMENT  Goal: Ability to express needs and understand communication  Outcome: Completed     Problem: Falls - Risk of:  Goal: Will remain free from falls  Description: Will remain free from falls  Outcome: Completed  Goal: Absence of physical injury  Description: Absence of physical injury  Outcome: Completed

## 2022-01-25 NOTE — PROGRESS NOTES
Patient:   Pee Alexander  MR#:    [id number]   Room:    [address] [place] [address]   YOB: 1955  Date of Progress Note: 1/25/2022  Time of Note                           8:11 AM  Consulting Physician:   Mickey Martell M.D. Attending Physician:  Aleja Espinoza MD       CHIEF COMPLAINT:  \"I did not have a fucking seizure\"  ? HISTORY OF PRESENT ILLNESS:   This 75 y.o. male who presents with new onset seizures. According to the chart the patient's wife heard a \"grunting noise\" while she was cooking around 4:30 PM on 1/23. She noted that he was confused and drooling at the time. ER physician's note says he was sitting upright watching television and slumped over and went unresponsive and was unable to speak when he woke up and had some right facial droop noted by EMS in route. CT of the head and CTA of the head and neck are unremarkable. While in the ED he had a focal seizure involving the right face which then generalized. He was not felt to be a TPA candidate due to seizures at the onset. Rometta Favre He was given Ativan and loaded up with Keppra and has been stable overnight. No further seizures. Denies a seizure history. Denies illicit drug use. Says he drinks 5 or 6 beers a day including the day of the event. No more seizure episodes since admission. Patient wants to go home again today. REVIEW OF SYSTEMS:  Constitutional: No fevers No chills  Neck:No stiffness  Respiratory: No shortness of breath  Cardiovascular: No chest pain No palpitations  Gastrointestinal: No abdominal pain    Genitourinary: No Dysuria  Neurological: No headache, no confusion      PHYSICAL EXAM:  BP (!) 147/76   Pulse 83   Temp 97.5 °F (36.4 °C) (Temporal)   Resp 20   Ht 5' 6\" (1.676 m)   Wt 139 lb 12.8 oz (63.4 kg)   SpO2 97%   BMI 22.56 kg/m²     Constitutional: he appears well-developed and well-nourished. Eyes - conjunctiva normal.  Pupils react to light  Ear, nose, throat -hearing intact to voice.  No scars, masses, or lesions over external nose or ears, no atrophy of tongue  Neck-symmetric, no masses noted, no jugular vein distension  Respiration- chest wall appears symmetric, good expansion,   normal effort without use of accessory muscles  Cardiovascular- RRR  Musculoskeletal - no significant wasting of muscles noted, no bony deformities, gait no gross ataxia  Extremities-no clubbing, cyanosis or edema  Skin - warm, dry, and intact. No rash, erythema, or pallor. Psychiatric - mood, affect, and behavior appear normal.      Neurology  NEUROLOGICAL EXAM:      Mental status   Awake, alert, fluent oriented x 3 appropriate affect  Attention and concentration appear appropriate  Recent and remote memory appears unremarkable  Speech normal without dysarthria  No clear issues with language       Cranial Nerves   CN II- Visual fields grossly unremarkable  CN III, IV,VI-EOMI, No nystagmus, conjugate eye movements, no ptosis  CN VII-no facial asymmetry  CN VIII-Hearing intact   CN IX and X- Palate elevates in midline  CN XI-good shoulder shrug  CN XII-Tongue midline with no fasciculations or fibrillations          Motor function  Antigravity x 4     Sensory function Intact to light touch     Cerebellar F-N intact     Tremor None present     Gait                  Not tested           Nursing/pcp notes, imaging,labs and vitals reviewed.      PT,OT and/or speech notes reviewed    Lab Results   Component Value Date    WBC 5.9 01/25/2022    HGB 13.1 (L) 01/25/2022    HCT 40.6 (L) 01/25/2022    MCV 96.0 (H) 01/25/2022     01/25/2022     Lab Results   Component Value Date     01/25/2022    K 4.0 01/25/2022    CL 99 01/25/2022    CO2 18 (L) 01/25/2022    BUN 11 01/25/2022    CREATININE 0.8 01/25/2022    GLUCOSE 59 (L) 01/25/2022    CALCIUM 8.5 (L) 01/25/2022    PROT 5.2 (L) 01/24/2022    LABALBU 3.5 01/24/2022    BILITOT 0.6 01/24/2022    ALKPHOS 44 01/24/2022    AST 17 01/24/2022    ALT 10 01/24/2022    LABGLOM >60 01/25/2022    GFRAA >59 01/25/2022     Lab Results   Component Value Date    INR 1.17 01/23/2022   No results found for: PHENYTOIN, ESR, CRP    EXAM: MR BRAIN WITHOUT AND WITH IV CONTRAST on 1/24/2022 11:20 AM   COMPARISON: None    HISTORY: 75years-old Male. New onset seizures   TECHNIQUE:    Ultrafast pulse sequences were obtained of the brain before and after   the administration of IV contrast.    REPORT:    Mild generalized cerebral volume loss. Minimal chronic microvascular   changes. There is no evidence of mass-effect or midline shift. No   reduced diffusivity is demonstrated. No abnormally enhancing lesions   are identified. The brainstem, sella, pituitary, cerebellum are   unremarkable. The basal cisterns are preserved. No acute osseous lesion. The intraorbital structures are unremarkable. The flow voids are preserved. Dural sinuses are patent.     Mastoid air cells are clear. The paranasal sinuses are free of   obstructive mucosal disease.        Impression   1.  No acute intracranial abnormalities. 2.  Remote right basal ganglia infarct. Signed by Dr Autumn Garcia: Previous medical records, medications were reviewed at today's visit    IMPRESSION:   New onset seizures with left hemisphere focus by history. Definite evidence of bitten tongue as well. Normal CPK. He had at least 2 seizures on 1/23. Currently on Keppra 500 mg orally twice daily and tolerating it adequately. No further seizures since admission. Cause of seizures is unknown. MRI of the brain and EEG unrevealing. Recommend continue Keppra and seizure precautions with no driving for 3 months per [employer]. This was discussed with the patient and he verbalized understanding. Okay with me to go home today. Should follow-up with neurology in 1 month.

## 2022-01-26 ENCOUNTER — CARE COORDINATION (OUTPATIENT)
Dept: CASE MANAGEMENT | Age: 67
End: 2022-01-26

## 2022-01-26 NOTE — CARE COORDINATION
Transitions of Care Call  Call within 2 business days of discharge: Yes    Patient: Prabhu Mcgrath Patient : 1955   MRN: [id number]  Reason for Admission: Seizure  Discharge Date: 22 RARS: Readmission Risk Score: 8.5 ( )      Last Discharge [facility]       Complaint Diagnosis Description Type Department Provider    22 Neurologic Problem New onset seizure ([address] . .. ED to Hosp-Admission (Discharged) (ADMITTED) [facility] Fang Bauer MD; Genet Sher. .. Was this an external facility discharge? No    Challenges to be reviewed by the provider   Additional needs identified to be addressed with provider: No           Encounter was not routed to provider for escalation. Method of communication with provider: none. Discussed COVID-19 related testing which was: available at this time. Test results were: negative. Patient informed of results, if available? Already aware. Current Symptoms: no COVID 19 symptoms    Reviewed New or Changed Meds: yes, has picked up and started Keppra. Do you have what you need at home?  Durable Medical Equipment ordered at discharge: None   Home Health/Outpatient orders at discharge: none  Was patient discharged with a pulse oximeter? No   Patient education provided: Reviewed appropriate site of care based on symptoms and resources available to patient including: PCP, Specialist, Urgent care clinics, Rory Huang, When to call 911 and [address]. Follow up appointment scheduled within 7 days of discharge: yes. New patient for Dr. Green. 2022, 10:30 am     Future Appointments   Date Time Provider Ty Bailey   2022  9:00 AM Jose E Hopson MD [facility]       Interventions: - Reviewed encounter information for continuity of care prior to follow up Care Transitions phone call - chart notes, consults, progress notes, test results, med list, appointments, AVS, other information.     Reviewed discharge instructions,

## 2022-02-24 ENCOUNTER — TELEPHONE (OUTPATIENT)
Dept: NEUROLOGY | Age: 67
End: 2022-02-24

## 2022-02-25 NOTE — TELEPHONE ENCOUNTER
I spoke with Patients wife, she said they called yesterday to r/s and are fine with the appointment date and time.

## 2022-03-31 ENCOUNTER — OFFICE VISIT (OUTPATIENT)
Dept: NEUROLOGY | Age: 67
End: 2022-03-31
Payer: MEDICARE

## 2022-03-31 VITALS
DIASTOLIC BLOOD PRESSURE: 64 MMHG | WEIGHT: 139 LBS | HEIGHT: 66 IN | OXYGEN SATURATION: 99 % | BODY MASS INDEX: 22.34 KG/M2 | HEART RATE: 94 BPM | SYSTOLIC BLOOD PRESSURE: 118 MMHG

## 2022-03-31 DIAGNOSIS — R56.9 SEIZURE (HCC): Primary | ICD-10-CM

## 2022-03-31 PROCEDURE — 3017F COLORECTAL CA SCREEN DOC REV: CPT | Performed by: PSYCHIATRY & NEUROLOGY

## 2022-03-31 PROCEDURE — G8484 FLU IMMUNIZE NO ADMIN: HCPCS | Performed by: PSYCHIATRY & NEUROLOGY

## 2022-03-31 PROCEDURE — 99213 OFFICE O/P EST LOW 20 MIN: CPT | Performed by: PSYCHIATRY & NEUROLOGY

## 2022-03-31 PROCEDURE — 1123F ACP DISCUSS/DSCN MKR DOCD: CPT | Performed by: PSYCHIATRY & NEUROLOGY

## 2022-03-31 PROCEDURE — 4040F PNEUMOC VAC/ADMIN/RCVD: CPT | Performed by: PSYCHIATRY & NEUROLOGY

## 2022-03-31 PROCEDURE — 1036F TOBACCO NON-USER: CPT | Performed by: PSYCHIATRY & NEUROLOGY

## 2022-03-31 PROCEDURE — 1111F DSCHRG MED/CURRENT MED MERGE: CPT | Performed by: PSYCHIATRY & NEUROLOGY

## 2022-03-31 PROCEDURE — G8427 DOCREV CUR MEDS BY ELIG CLIN: HCPCS | Performed by: PSYCHIATRY & NEUROLOGY

## 2022-03-31 PROCEDURE — G8420 CALC BMI NORM PARAMETERS: HCPCS | Performed by: PSYCHIATRY & NEUROLOGY

## 2022-03-31 NOTE — PROGRESS NOTES
Chief Complaint   Patient presents with    Follow-Up from Hospital     Seizures no episodes to report        Deacon Jimenez is a 75y.o. year old male who is seen for evaluation of right-sided focal motor seizures which he had 1 day in January of this year. He also bit his tongue. MRI of the brain and EEG were unrevealing. Patient reluctant to admit that he had a seizure. He was placed on Keppra and today is his follow-up visit. He tells me that he never took the [address] he says that he has been driving but he has been told that he is not to drive until seizure-free for 3 months as per [employer]. Says that he broke out in a rash on his leg at some point but it was not shingles. Active Ambulatory Problems     Diagnosis Date Noted    Stroke-like symptoms 01/23/2022    Alcohol abuse, daily use 01/23/2022    Hyponatremia 01/23/2022    COPD (chronic obstructive pulmonary disease) ([facility] [address] 01/23/2022    Hypoglycemia 01/24/2022     Resolved Ambulatory Problems     Diagnosis Date Noted    No Resolved Ambulatory Problems     No Additional Past Medical History       History reviewed. No pertinent surgical history. History reviewed. No pertinent family history. No Known Allergies    Social History     Socioeconomic History    Marital status:      Spouse name: Not on file    Number of children: Not on file    Years of education: Not on file    Highest education level: Not on file   Occupational History    Not on file   Tobacco Use    Smoking status: Former Smoker    Smokeless tobacco: Never Used   Vaping Use    Vaping Use: Never used   Substance and Sexual Activity    Alcohol use:  Yes    Drug use: Never    Sexual activity: Not Currently   Other Topics Concern    Not on file   Social History Narrative    Not on file     Social Determinants of Health     Financial Resource Strain:     Difficulty of Paying Living Expenses: Not on file   Food Insecurity:     Worried About Running Out of [employer] in the Last Year: Not on file    Ran Out of Food in the Last Year: Not on file   Transportation Needs:     Lack of Transportation (Medical): Not on file    Lack of Transportation (Non-Medical): Not on file   Physical Activity:     Days of Exercise per Week: Not on file    Minutes of Exercise per Session: Not on file   Stress:     Feeling of Stress : Not on file   Social Connections:     Frequency of Communication with Friends and Family: Not on file    Frequency of Social Gatherings with Friends and Family: Not on file    Attends Gnosticism Services: Not on file    Active Member of [address] [place] [address] or Organizations: Not on file    Attends Club or Organization Meetings: Not on file    Marital Status: Not on file   Intimate Partner Violence:     Fear of Current or Ex-Partner: Not on file    Emotionally Abused: Not on file    Physically Abused: Not on file    Sexually Abused: Not on file   Housing Stability:     Unable to Pay for Housing in the Last Year: Not on file    Number of [place] in the Last Year: Not on file    Unstable Housing in the Last Year: Not on file       Review of Systems      Constitutional: []? Fever []? Sweat []? Chills []? Recent Injury [x]? Denies all unless marked  HEENT:[]? Headache  []? Head Injury/Hearing Loss  []? Sore Throat  []? Ear Ache/Dizziness  [x]? Denies all unless marked  Spine:  []? Neck pain  []? Back pain  []? Sciaticia  [x]? Denies all unless marked  Cardiovascular:[]? Heart Disease []? Chest Pain []? Palpitations  [x]? Denies all unless marked  Pulmonary: []? Shortness of Breath []? Cough   [x]? Denies all unless marke  Gastrointestinal: []? Nausea  []? Vomiting  []? Abdominal Pain  []? Constipation  []? Diarrhea  []? Dark Bloody Stools  [x]? Denies all unless marked  Psychiatric/Behavioral:[]? Depression []? Anxiety [x]? Denies all unless marked  Genitourinary:   []? Frequency  []? Urgency  []? Incontinence []? Pain with Urination  [x]?  Denies all unless marked  Extremities: effort without use of accessory muscles  Cardiovascular- RRR  Musculoskeletal - no significant wasting of muscles noted, no bony deformities, gait no gross ataxia  Extremities-no clubbing, cyanosis or edema  Skin - warm, dry, and intact. No rash, erythema, or pallor. Psychiatric - mood, affect, and behavior appear normal.      Neurological exam  Awake, alert, fluent oriented x 3 appropriate affect  Attention and concentration appear appropriate  Recent and remote memory appears unremarkable  Speech normal without dysarthria  No clear issues with language of fund of knowledge    Cranial Nerve Exam   CN II- Visual fields grossly unremarkable. VA adequate. CN III, IV,VI- PERRLA, EOMI, No nystagmus, conjugate eye movements, no ptosis  CN VII-no facial asymmetry  CN VIII-Hearing intact   CN IX and X- Palate elevates in midline  CN XI-good shoulder shrug  CN XII-Tongue midline with no fasciculations or fibrillations    Motor Exam  V/V throughout upper and lower extremities bilaterally, no cogwheeling, normal tone      Reflexes   Absent throughout    Tremors- no tremors in hands or head noted    Gait  Normal base and speed  No ataxia.  No Romberg sign    Coordination  Finger to nose and NADEGE-unremarkable    Lab Results   Component Value Date    NEWQNSWQ42 517 01/25/2022     Lab Results   Component Value Date    WBC 5.9 01/25/2022    HGB 13.1 (L) 01/25/2022    HCT 40.6 (L) 01/25/2022    MCV 96.0 (H) 01/25/2022     01/25/2022     Lab Results   Component Value Date     01/25/2022    K 4.0 01/25/2022    CL 99 01/25/2022    CO2 18 (L) 01/25/2022    BUN 11 01/25/2022    CREATININE 0.8 01/25/2022    GLUCOSE 59 (L) 01/25/2022    CALCIUM 8.5 (L) 01/25/2022    PROT 5.2 (L) 01/24/2022    LABALBU 3.5 01/24/2022    BILITOT 0.6 01/24/2022    ALKPHOS 44 01/24/2022    AST 17 01/24/2022    ALT 10 01/24/2022    LABGLOM >60 01/25/2022    GFRAA >59 01/25/2022           Assessment    ICD-10-CM    1. Seizure ([address]  R56.9 NM DISCHARGE MEDS RECONCILED W/ CURRENT OUTPATIENT MED LIST     Patient presented in January of this year with what appeared to be focal motor seizures involving the right side of his body. He has had no further events off of anticonvulsants. When seizure-free for 3 months he is okay to drive. He will come back and see me should he have any future difficulties. Plan  Orders Placed This Encounter   Procedures    MO DISCHARGE MEDS RECONCILED W/ CURRENT OUTPATIENT MED LIST           Return if symptoms worsen or fail to improve.     (Please note that portions of this note were completed with a voice recognition program. Efforts were made to edit the dictations but occasionally words are mis-transcribed.)

## 2022-04-17 ENCOUNTER — HOSPITAL ENCOUNTER (INPATIENT)
Age: 67
LOS: 4 days | Discharge: HOME | DRG: 190 | End: 2022-04-21
Attending: EMERGENCY MEDICINE | Admitting: STUDENT IN AN ORGANIZED HEALTH CARE EDUCATION/TRAINING PROGRAM
Payer: MEDICARE

## 2022-04-17 ENCOUNTER — APPOINTMENT (OUTPATIENT)
Dept: GENERAL RADIOLOGY | Age: 67
DRG: 190 | End: 2022-04-17
Payer: MEDICARE

## 2022-04-17 DIAGNOSIS — J44.1 COPD EXACERBATION (HCC): ICD-10-CM

## 2022-04-17 DIAGNOSIS — R79.89 ELEVATED BRAIN NATRIURETIC PEPTIDE (BNP) LEVEL: ICD-10-CM

## 2022-04-17 DIAGNOSIS — I16.1 HYPERTENSIVE EMERGENCY: ICD-10-CM

## 2022-04-17 DIAGNOSIS — B34.8 RHINOVIRUS INFECTION: ICD-10-CM

## 2022-04-17 DIAGNOSIS — J96.01 ACUTE RESPIRATORY FAILURE WITH HYPOXEMIA (HCC): Primary | ICD-10-CM

## 2022-04-17 PROBLEM — I10 HYPERTENSION: Status: ACTIVE | Noted: 2022-04-17

## 2022-04-17 PROBLEM — B34.2 CORONAVIRUS INFECTION: Status: ACTIVE | Noted: 2022-04-17

## 2022-04-17 PROBLEM — E87.5 HYPERKALEMIA: Status: ACTIVE | Noted: 2022-04-17

## 2022-04-17 LAB
ADENOVIRUS BY PCR: NOT DETECTED
ALBUMIN SERPL-MCNC: 4.2 G/DL (ref 3.5–5.2)
ALP BLD-CCNC: 69 U/L (ref 40–130)
ALT SERPL-CCNC: 12 U/L (ref 5–41)
ANION GAP SERPL CALCULATED.3IONS-SCNC: 22 MMOL/L (ref 7–19)
APTT: 29.6 SEC (ref 26–36.2)
AST SERPL-CCNC: 22 U/L (ref 5–40)
BASE EXCESS ARTERIAL: -8.2 MMOL/L (ref -2–2)
BASOPHILS ABSOLUTE: 0.2 K/UL (ref 0–0.2)
BASOPHILS RELATIVE PERCENT: 1.9 % (ref 0–1)
BILIRUB SERPL-MCNC: 0.7 MG/DL (ref 0.2–1.2)
BORDETELLA PARAPERTUSSIS BY PCR: NOT DETECTED
BORDETELLA PERTUSSIS BY PCR: NOT DETECTED
BUN BLDV-MCNC: 18 MG/DL (ref 8–23)
CALCIUM SERPL-MCNC: 9.7 MG/DL (ref 8.8–10.2)
CARBOXYHEMOGLOBIN ARTERIAL: 1.3 % (ref 0–5)
CHLAMYDOPHILIA PNEUMONIAE BY PCR: NOT DETECTED
CHLORIDE BLD-SCNC: 94 MMOL/L (ref 98–111)
CO2: 18 MMOL/L (ref 22–29)
CORONAVIRUS 229E BY PCR: NOT DETECTED
CORONAVIRUS HKU1 BY PCR: NOT DETECTED
CORONAVIRUS NL63 BY PCR: NOT DETECTED
CORONAVIRUS OC43 BY PCR: DETECTED
CREAT SERPL-MCNC: 1 MG/DL (ref 0.5–1.2)
EOSINOPHILS ABSOLUTE: 0.3 K/UL (ref 0–0.6)
EOSINOPHILS RELATIVE PERCENT: 2.3 % (ref 0–5)
ETHANOL: <10 MG/DL (ref 0–0.08)
GFR AFRICAN AMERICAN: >59
GFR NON-AFRICAN AMERICAN: >60
GLUCOSE BLD-MCNC: 121 MG/DL (ref 74–109)
HCO3 ARTERIAL: 17.2 MMOL/L (ref 22–26)
HCT VFR BLD CALC: 51.3 % (ref 42–52)
HEMOGLOBIN, ART, EXTENDED: 17.2 G/DL (ref 14–18)
HEMOGLOBIN: 16.5 G/DL (ref 14–18)
HUMAN METAPNEUMOVIRUS BY PCR: NOT DETECTED
HUMAN RHINOVIRUS/ENTEROVIRUS BY PCR: DETECTED
IMMATURE GRANULOCYTES #: 0.3 K/UL
INFLUENZA A BY PCR: NOT DETECTED
INFLUENZA B BY PCR: NOT DETECTED
INR BLD: 1.1 (ref 0.88–1.18)
LACTIC ACID: 2.7 MMOL/L (ref 0.5–1.9)
LYMPHOCYTES ABSOLUTE: 1.4 K/UL (ref 1.1–4.5)
LYMPHOCYTES RELATIVE PERCENT: 13.4 % (ref 20–40)
MAGNESIUM: 2.2 MG/DL (ref 1.6–2.4)
MCH RBC QN AUTO: 31 PG (ref 27–31)
MCHC RBC AUTO-ENTMCNC: 32.2 G/DL (ref 33–37)
MCV RBC AUTO: 96.2 FL (ref 80–94)
METHEMOGLOBIN ARTERIAL: 0.8 %
MONOCYTES ABSOLUTE: 1 K/UL (ref 0–0.9)
MONOCYTES RELATIVE PERCENT: 9.3 % (ref 0–10)
MYCOPLASMA PNEUMONIAE BY PCR: NOT DETECTED
NEUTROPHILS ABSOLUTE: 7.6 K/UL (ref 1.5–7.5)
NEUTROPHILS RELATIVE PERCENT: 70.4 % (ref 50–65)
O2 CONTENT ARTERIAL: 23.4 ML/DL
O2 SAT, ARTERIAL: 96.2 %
O2 THERAPY: ABNORMAL
PARAINFLUENZA VIRUS 1 BY PCR: NOT DETECTED
PARAINFLUENZA VIRUS 2 BY PCR: NOT DETECTED
PARAINFLUENZA VIRUS 3 BY PCR: NOT DETECTED
PARAINFLUENZA VIRUS 4 BY PCR: NOT DETECTED
PCO2 ARTERIAL: 35 MMHG (ref 35–45)
PDW BLD-RTO: 13.3 % (ref 11.5–14.5)
PH ARTERIAL: 7.3 (ref 7.35–7.45)
PHOSPHORUS: 5.2 MG/DL (ref 2.5–4.5)
PLATELET # BLD: 403 K/UL (ref 130–400)
PMV BLD AUTO: 9.4 FL (ref 9.4–12.4)
PO2 ARTERIAL: 131 MMHG (ref 80–100)
POTASSIUM REFLEX MAGNESIUM: 5.3 MMOL/L (ref 3.5–5)
POTASSIUM, WHOLE BLOOD: 4.6
PRO-BNP: 3026 PG/ML (ref 0–900)
PROCALCITONIN: 0.17 NG/ML (ref 0–0.09)
PROTHROMBIN TIME: 14.1 SEC (ref 12–14.6)
RBC # BLD: 5.33 M/UL (ref 4.7–6.1)
RESPIRATORY SYNCYTIAL VIRUS BY PCR: NOT DETECTED
SARS-COV-2, PCR: NOT DETECTED
SODIUM BLD-SCNC: 134 MMOL/L (ref 136–145)
TOTAL PROTEIN: 7.4 G/DL (ref 6.6–8.7)
TROPONIN: <0.01 NG/ML (ref 0–0.03)
VITAMIN D 25-HYDROXY: 5.8 NG/ML
WBC # BLD: 10.8 K/UL (ref 4.8–10.8)

## 2022-04-17 PROCEDURE — 99283 EMERGENCY DEPT VISIT LOW MDM: CPT

## 2022-04-17 PROCEDURE — 94660 CPAP INITIATION&MGMT: CPT

## 2022-04-17 PROCEDURE — 85025 COMPLETE CBC W/AUTO DIFF WBC: CPT

## 2022-04-17 PROCEDURE — 96374 THER/PROPH/DIAG INJ IV PUSH: CPT

## 2022-04-17 PROCEDURE — 83880 ASSAY OF NATRIURETIC PEPTIDE: CPT

## 2022-04-17 PROCEDURE — 6360000002 HC RX W HCPCS: Performed by: EMERGENCY MEDICINE

## 2022-04-17 PROCEDURE — 84145 PROCALCITONIN (PCT): CPT

## 2022-04-17 PROCEDURE — 84484 ASSAY OF TROPONIN QUANT: CPT

## 2022-04-17 PROCEDURE — 6370000000 HC RX 637 (ALT 250 FOR IP): Performed by: EMERGENCY MEDICINE

## 2022-04-17 PROCEDURE — 2580000003 HC RX 258: Performed by: NURSE PRACTITIONER

## 2022-04-17 PROCEDURE — 94640 AIRWAY INHALATION TREATMENT: CPT

## 2022-04-17 PROCEDURE — 36600 WITHDRAWAL OF ARTERIAL BLOOD: CPT

## 2022-04-17 PROCEDURE — 6360000002 HC RX W HCPCS: Performed by: STUDENT IN AN ORGANIZED HEALTH CARE EDUCATION/TRAINING PROGRAM

## 2022-04-17 PROCEDURE — 83735 ASSAY OF MAGNESIUM: CPT

## 2022-04-17 PROCEDURE — 2700000000 HC OXYGEN THERAPY PER DAY

## 2022-04-17 PROCEDURE — 82803 BLOOD GASES ANY COMBINATION: CPT

## 2022-04-17 PROCEDURE — 71045 X-RAY EXAM CHEST 1 VIEW: CPT

## 2022-04-17 PROCEDURE — 93005 ELECTROCARDIOGRAM TRACING: CPT | Performed by: EMERGENCY MEDICINE

## 2022-04-17 PROCEDURE — 82077 ASSAY SPEC XCP UR&BREATH IA: CPT

## 2022-04-17 PROCEDURE — 6370000000 HC RX 637 (ALT 250 FOR IP): Performed by: STUDENT IN AN ORGANIZED HEALTH CARE EDUCATION/TRAINING PROGRAM

## 2022-04-17 PROCEDURE — 1210000000 HC MED SURG R&B

## 2022-04-17 PROCEDURE — 36415 COLL VENOUS BLD VENIPUNCTURE: CPT

## 2022-04-17 PROCEDURE — 82306 VITAMIN D 25 HYDROXY: CPT

## 2022-04-17 PROCEDURE — 85730 THROMBOPLASTIN TIME PARTIAL: CPT

## 2022-04-17 PROCEDURE — 80053 COMPREHEN METABOLIC PANEL: CPT

## 2022-04-17 PROCEDURE — 84100 ASSAY OF PHOSPHORUS: CPT

## 2022-04-17 PROCEDURE — 0202U NFCT DS 22 TRGT SARS-COV-2: CPT

## 2022-04-17 PROCEDURE — 6360000002 HC RX W HCPCS: Performed by: NURSE PRACTITIONER

## 2022-04-17 PROCEDURE — 83605 ASSAY OF LACTIC ACID: CPT

## 2022-04-17 PROCEDURE — 85610 PROTHROMBIN TIME: CPT

## 2022-04-17 RX ORDER — IPRATROPIUM BROMIDE AND ALBUTEROL SULFATE 2.5; .5 MG/3ML; MG/3ML
1 SOLUTION RESPIRATORY (INHALATION)
Status: DISCONTINUED | OUTPATIENT
Start: 2022-04-17 | End: 2022-04-17

## 2022-04-17 RX ORDER — ACETAMINOPHEN 650 MG/1
650 SUPPOSITORY RECTAL EVERY 6 HOURS PRN
Status: DISCONTINUED | OUTPATIENT
Start: 2022-04-17 | End: 2022-04-21 | Stop reason: HOSPADM

## 2022-04-17 RX ORDER — ACETAMINOPHEN 325 MG/1
650 TABLET ORAL EVERY 6 HOURS PRN
Status: DISCONTINUED | OUTPATIENT
Start: 2022-04-17 | End: 2022-04-21 | Stop reason: HOSPADM

## 2022-04-17 RX ORDER — IPRATROPIUM BROMIDE AND ALBUTEROL SULFATE 2.5; .5 MG/3ML; MG/3ML
1 SOLUTION RESPIRATORY (INHALATION) EVERY 4 HOURS
Status: DISCONTINUED | OUTPATIENT
Start: 2022-04-17 | End: 2022-04-21 | Stop reason: HOSPADM

## 2022-04-17 RX ORDER — NITROGLYCERIN 0.4 MG/1
0.4 TABLET SUBLINGUAL ONCE
Status: COMPLETED | OUTPATIENT
Start: 2022-04-17 | End: 2022-04-17

## 2022-04-17 RX ORDER — FUROSEMIDE 10 MG/ML
60 INJECTION INTRAMUSCULAR; INTRAVENOUS ONCE
Status: COMPLETED | OUTPATIENT
Start: 2022-04-17 | End: 2022-04-17

## 2022-04-17 RX ORDER — IPRATROPIUM BROMIDE AND ALBUTEROL SULFATE 2.5; .5 MG/3ML; MG/3ML
1 SOLUTION RESPIRATORY (INHALATION) ONCE
Status: COMPLETED | OUTPATIENT
Start: 2022-04-17 | End: 2022-04-17

## 2022-04-17 RX ORDER — HYDRALAZINE HYDROCHLORIDE 20 MG/ML
5 INJECTION INTRAMUSCULAR; INTRAVENOUS EVERY 6 HOURS PRN
Status: DISCONTINUED | OUTPATIENT
Start: 2022-04-17 | End: 2022-04-18

## 2022-04-17 RX ORDER — SODIUM CHLORIDE 9 MG/ML
INJECTION, SOLUTION INTRAVENOUS PRN
Status: DISCONTINUED | OUTPATIENT
Start: 2022-04-17 | End: 2022-04-21 | Stop reason: HOSPADM

## 2022-04-17 RX ORDER — POLYETHYLENE GLYCOL 3350 17 G/17G
17 POWDER, FOR SOLUTION ORAL DAILY PRN
Status: DISCONTINUED | OUTPATIENT
Start: 2022-04-17 | End: 2022-04-21 | Stop reason: HOSPADM

## 2022-04-17 RX ORDER — SODIUM CHLORIDE 0.9 % (FLUSH) 0.9 %
5-40 SYRINGE (ML) INJECTION PRN
Status: DISCONTINUED | OUTPATIENT
Start: 2022-04-17 | End: 2022-04-21 | Stop reason: HOSPADM

## 2022-04-17 RX ORDER — METHYLPREDNISOLONE SODIUM SUCCINATE 40 MG/ML
40 INJECTION, POWDER, LYOPHILIZED, FOR SOLUTION INTRAMUSCULAR; INTRAVENOUS EVERY 6 HOURS
Status: COMPLETED | OUTPATIENT
Start: 2022-04-18 | End: 2022-04-18

## 2022-04-17 RX ORDER — ONDANSETRON 4 MG/1
4 TABLET, ORALLY DISINTEGRATING ORAL EVERY 8 HOURS PRN
Status: DISCONTINUED | OUTPATIENT
Start: 2022-04-17 | End: 2022-04-21 | Stop reason: HOSPADM

## 2022-04-17 RX ORDER — ARFORMOTEROL TARTRATE 15 UG/2ML
15 SOLUTION RESPIRATORY (INHALATION) 2 TIMES DAILY
Status: DISCONTINUED | OUTPATIENT
Start: 2022-04-17 | End: 2022-04-21 | Stop reason: HOSPADM

## 2022-04-17 RX ORDER — ONDANSETRON 2 MG/ML
4 INJECTION INTRAMUSCULAR; INTRAVENOUS EVERY 6 HOURS PRN
Status: DISCONTINUED | OUTPATIENT
Start: 2022-04-17 | End: 2022-04-21 | Stop reason: HOSPADM

## 2022-04-17 RX ORDER — BUDESONIDE 0.5 MG/2ML
0.5 INHALANT ORAL 2 TIMES DAILY
Status: DISCONTINUED | OUTPATIENT
Start: 2022-04-17 | End: 2022-04-21 | Stop reason: HOSPADM

## 2022-04-17 RX ORDER — SODIUM CHLORIDE 0.9 % (FLUSH) 0.9 %
5-40 SYRINGE (ML) INJECTION EVERY 12 HOURS SCHEDULED
Status: DISCONTINUED | OUTPATIENT
Start: 2022-04-17 | End: 2022-04-21 | Stop reason: HOSPADM

## 2022-04-17 RX ADMIN — IPRATROPIUM BROMIDE AND ALBUTEROL SULFATE 1 AMPULE: 2.5; .5 SOLUTION RESPIRATORY (INHALATION) at 19:44

## 2022-04-17 RX ADMIN — NITROGLYCERIN 0.4 MG: 0.4 TABLET, ORALLY DISINTEGRATING SUBLINGUAL at 11:31

## 2022-04-17 RX ADMIN — ARFORMOTEROL TARTRATE 15 MCG: 15 SOLUTION RESPIRATORY (INHALATION) at 19:42

## 2022-04-17 RX ADMIN — SODIUM CHLORIDE, PRESERVATIVE FREE 10 ML: 5 INJECTION INTRAVENOUS at 19:30

## 2022-04-17 RX ADMIN — FUROSEMIDE 60 MG: 10 INJECTION, SOLUTION INTRAMUSCULAR; INTRAVENOUS at 12:21

## 2022-04-17 RX ADMIN — IPRATROPIUM BROMIDE AND ALBUTEROL SULFATE 1 AMPULE: 2.5; .5 SOLUTION RESPIRATORY (INHALATION) at 13:36

## 2022-04-17 RX ADMIN — BUDESONIDE 500 MCG: 0.5 SUSPENSION RESPIRATORY (INHALATION) at 19:46

## 2022-04-17 RX ADMIN — HYDRALAZINE HYDROCHLORIDE 5 MG: 20 INJECTION, SOLUTION INTRAMUSCULAR; INTRAVENOUS at 15:19

## 2022-04-17 RX ADMIN — ENOXAPARIN SODIUM 40 MG: 40 INJECTION SUBCUTANEOUS at 15:19

## 2022-04-17 ASSESSMENT — ENCOUNTER SYMPTOMS
SHORTNESS OF BREATH: 1
ABDOMINAL PAIN: 0
VOMITING: 0
COUGH: 1
WHEEZING: 1

## 2022-04-17 ASSESSMENT — PAIN SCALES - GENERAL
PAINLEVEL_OUTOF10: 0
PAINLEVEL_OUTOF10: 0

## 2022-04-17 NOTE — ED PROVIDER NOTES
[facility] EMERGENCY DEPT  EMERGENCY DEPARTMENT ENCOUNTER      Pt Name: Catarina Green  MRN: [id number]  [place] 1955  Date of evaluation: 4/17/2022  Provider: Jerica Ji MD    CHIEF COMPLAINT       Chief Complaint   Patient presents with    Shortness of Breath     pt has been congested for the past 2 weeks. Pt has been getting worse and today and severly short of breath         HISTORY OF PRESENT ILLNESS   (Location/Symptom, Timing/Onset,Context/Setting, Quality, Duration, Modifying Factors, Severity)  Note limiting factors. Catarina Green is a 75 y.o. male who presents to the emergency department with complaint of shortness of breath. States has been present and gradually worsening over the last 2 weeks but significantly worse today. Patient noted to be hypoxic with oxygen saturation in the 80s with EMS. Patient has history of COPD and has had cough recently. Denies any history of heart failure. Patient noted to be significantly hypertensive in route here. Patient was given DuoNeb with Solu-Medrol in route with EMS without significant improvement    HPI    NursingNotes were reviewed. REVIEW OF SYSTEMS    (2-9 systems for level 4, 10 or more for level 5)     Review of Systems   Unable to perform ROS: Severe respiratory distress       A complete review of systems was performed and is negative except as noted above in the HPI. PAST MEDICAL HISTORY     Past Medical History:   Diagnosis Date    COPD (chronic obstructive pulmonary disease) ([facility] [address]          SURGICAL HISTORY     No past surgical history on file. CURRENT MEDICATIONS       Previous Medications    ALBUTEROL SULFATE HFA (VENTOLIN HFA) 108 (90 BASE) MCG/ACT INHALER    Inhale 2 puffs into the lungs every 6 hours as needed for Wheezing    BUDESON-GLYCOPYRROL-FORMOTEROL (BREZTRI AEROSPHERE) 160-9-4.8 MCG/ACT AERO    Inhale into the lungs       ALLERGIES     Patient has no known allergies. FAMILY HISTORY     No family history on file. Oriented  Best Motor Response: Obeys commands  [place] Coma Scale Score: 15        PHYSICAL EXAM    (up to 7 for level 4, 8 or more for level 5)     ED Triage Vitals [04/17/22 1112]   BP Temp Temp Source Pulse Resp SpO2 Height Weight   (!) 201/150 98.8 °F (37.1 °C) Oral 135 28 (!) 88 % -- --       Physical Exam  Vitals and nursing note reviewed. Constitutional:       General: He is in acute distress. Appearance: He is well-developed. He is not diaphoretic. Interventions: Nasal cannula in place. HENT:      Head: Normocephalic and atraumatic. Eyes:      General: No scleral icterus. Neck:      Vascular: No JVD. Cardiovascular:      Rate and Rhythm: Normal rate and regular rhythm. Pulses:           Radial pulses are 2+ on the right side and 2+ on the left side. Dorsalis pedis pulses are 2+ on the right side and 2+ on the left side. Heart sounds: Normal heart sounds. No murmur heard. No friction rub. No gallop. Pulmonary:      Effort: Tachypnea, accessory muscle usage, prolonged expiration and respiratory distress present. Breath sounds: No stridor. Decreased breath sounds present. Chest:      Chest wall: No tenderness. Abdominal:      General: There is no distension. Palpations: Abdomen is soft. Tenderness: There is no abdominal tenderness. There is no guarding or rebound. Musculoskeletal:         General: No deformity. Normal range of motion. Right lower leg: No edema. Left lower leg: No edema. Skin:     General: Skin is warm and dry. Coloration: Skin is not pale. Findings: No erythema. Neurological:      Mental Status: He is alert and oriented to person, place, and time. GCS: GCS eye subscore is 4. GCS verbal subscore is 5. GCS motor subscore is 6. Cranial Nerves: No cranial nerve deficit. Motor: No abnormal muscle tone.       Coordination: Coordination normal.   Psychiatric:         Behavior: Behavior normal. Judgment: Judgment normal.         DIAGNOSTIC RESULTS     EKG: All EKG's are interpreted by the Emergency Department Physician who either signs or Co-signs this chart in the absence of a cardiologist.        RADIOLOGY:   Non-plain film images such as CT, Ultrasound and MRI are read by the radiologist. Plainradiographic images are visualized and preliminarily interpreted by the emergency physician with the below findings:        Interpretation per the Radiologist below, if available at the time of this note:    XR CHEST PORTABLE   Final Result   1. Mild hyperinflation of the lungs with probable basilar   atelectasis. .   Signed by Dr Rona Shultz            ED BEDSIDE ULTRASOUND:   Performed by ED Physician - none    LABS:  Labs Reviewed   RESPIRATORY PANEL, MOLECULAR, WITH COVID-19 - Abnormal; Notable for the following components:       Result Value    Coronavirus OC43 by PCR DETECTED (*)     Human Rhinovirus/Enterovirus by PCR DETECTED (*)     All other components within normal limits   BLOOD GAS, ARTERIAL - Abnormal; Notable for the following components:    pH, Arterial 7.300 (*)     pO2, Arterial 131.0 (*)     HCO3, Arterial 17.2 (*)     Base Excess, Arterial -8.2 (*)     All other components within normal limits   CBC WITH AUTO DIFFERENTIAL - Abnormal; Notable for the following components:    MCV 96.2 (*)     MCHC 32.2 (*)     Platelets 501 (*)     Neutrophils % 70.4 (*)     Lymphocytes % 13.4 (*)     Basophils % 1.9 (*)     Neutrophils Absolute 7.6 (*)     Monocytes Absolute 1.00 (*)     All other components within normal limits   COMPREHENSIVE METABOLIC PANEL W/ REFLEX TO MG FOR LOW K - Abnormal; Notable for the following components:    Sodium 134 (*)     Potassium reflex Magnesium 5.3 (*)     Chloride 94 (*)     CO2 18 (*)     Anion Gap 22 (*)     Glucose 121 (*)     All other components within normal limits   LACTIC ACID - Abnormal; Notable for the following components:    Lactic Acid 2.7 (*)     All other components within normal limits    Narrative:     CALL  [place]  KLED tel. ,  Chemistry results called to and read back by WAYNE LOPEZ KLED, 04/17/2022  11:54, by Sampson Coffman - Abnormal; Notable for the following components:    Pro-BNP 3,026 (*)     All other components within normal limits   TROPONIN   PROTIME-INR   APTT   URINALYSIS WITH REFLEX TO CULTURE       All other labs were within normal range or not returned as of this dictation. Medications   ipratropium-albuterol (DUONEB) nebulizer solution 1 ampule (has no administration in time range)   sodium chloride flush 0.9 % injection 5-40 mL (has no administration in time range)   sodium chloride flush 0.9 % injection 5-40 mL (has no administration in time range)   0.9 % sodium chloride infusion (has no administration in time range)   enoxaparin (LOVENOX) injection 40 mg (has no administration in time range)   ondansetron (ZOFRAN-ODT) disintegrating tablet 4 mg (has no administration in time range)     Or   ondansetron (ZOFRAN) injection 4 mg (has no administration in time range)   polyethylene glycol (GLYCOLAX) packet 17 g (has no administration in time range)   acetaminophen (TYLENOL) tablet 650 mg (has no administration in time range)     Or   acetaminophen (TYLENOL) suppository 650 mg (has no administration in time range)   nitroGLYCERIN (NITROSTAT) SL tablet 0.4 mg (0.4 mg SubLINGual Given 4/17/22 1131)   furosemide (LASIX) injection 60 mg (60 mg IntraVENous Given 4/17/22 1221)       EMERGENCY DEPARTMENT COURSE and DIFFERENTIALDIAGNOSIS/MDM:   Vitals:    Vitals:    04/17/22 1200 04/17/22 1205 04/17/22 1219 04/17/22 1231   BP: 83/64 101/66 118/76 124/84   Pulse: 122 122 122 116   Resp: 24 28 24 23   Temp:       TempSrc:       SpO2: 96% 98% 97% 97%       MDM    ED Course as of 04/17/22 1322   Sun Apr 17, 2022   1123 EKG shows sinus rhythm with a rate of 126. Some lateral ST depression, likely rate related ischemia.   Baseline artifact present. Normal intervals [APPLE]   1123 Patient tachycardic, hypertensive on arrival.  Hypoxic but improved with nasal cannula oxygen. Differential includes COPD exacerbation or acute CHF picture given hypertension and tachycardia. [APPLE]   1158 Pro-BNP(!): 3,026 [APPLE]   1311 Human Rhinovirus/Enterovirus by PCR(!): DETECTED [APPLE]   1311 Coronavirus OC43 by PCR(!): DETECTED [APPLE]   1319 Patient profoundly hypertensive on arrival here and in severe respiratory distress. Given 1 sublingual nitroglycerin and placed on BiPAP with rapid improvement in condition. Blood pressure decreased rapidly, likely due to decrease in catecholamine surge. Seems to have some commendation of COPD and CHF exacerbation. [APPLE]      ED Course User Index  [APPLE] Karson Waddell MD     Positive for coronavirus (non-covid)    Based on the evaluation and work-up here patient is felt to require further monitoring, work-up, or treatment that is available in the emergency department. Case was discussed with hospitalist who agrees for observation or admission for further management. Treatment and stabilization as necessary were provided in the emergency department prior to transfer of care to the medicine service. CONSULTS:  None    PROCEDURES:  Unless otherwise notedbelow, none     Procedures  CRITICAL CARE TIME   Total Critical Care time was 45 minutes, excluding separately reportable procedures. There was a high probability of clinically significant/life threatening deterioration in the patient's condition which required my urgent intervention. FINAL IMPRESSION     1. Acute respiratory failure with hypoxemia (HCC)    2. Hypertensive emergency    3. Elevated brain natriuretic peptide (BNP) level    4. Rhinovirus infection          DISPOSITION/PLAN   DISPOSITION Admitted 04/17/2022 01:21:55 PM      PATIENT REFERRED TO:  No follow-up provider specified.     DISCHARGE MEDICATIONS:  New Prescriptions    No medications on file (Please note that portions of this note were completed with a voice recognition program.  Efforts were made to edit the dictations butoccasionally words are mis-transcribed.)    Nita Stark MD (electronically signed)  AttendingEmerFive Rivers Medical Centercy Physician                 Nita Stark., MD  04/17/22 [phone]

## 2022-04-17 NOTE — ED NOTES
ER nurse tried to call report. Nurse was told that he would need to be called back by floor before giving report.       Noel Arthur RN  04/17/22 0179

## 2022-04-17 NOTE — ED NOTES
Notified Noemy Castro MD that pt was becoming hypotensive.  Holding lasix injection until pts pressure normalizes      Nicola Saxena RN  04/17/22 [id number]

## 2022-04-17 NOTE — H&P
[facility] - History & Physical      PCP: No primary care provider on file. Date of Admission: 4/17/2022    Date of Service: 4/17/2022    Chief Complaint:  Shortness of breath    History Of Present Illness: The patient is a 75 y.o. male who presented to [address] [place] [address] ED complaining of shortness of breath. Pt has history of COPD. He reports over past 2 weeks gradual increasing shortness of breath, cough, wheezing and sputum production from baseline copd symptoms. He tells me that his wife has had recent \"cold\". He has had decreased po intake of food and fluids over past few days. He denies known history of chf. He has had no lower extremity edema or orthopnea. He has had no abdominal pain, vomiting or diarrhea. In ED, respiratory PCR panel positive for coronavirus OC43, human rhinovirus. He was initially hypoxic with sats in the 80's en route having been given duoneb and solumedrol. He was placed on bipap and given SL NTG and lasix 60mg with improvement in blood pressure from initial reading of 201/150    CXR-mild hyperinflation with probable basilar atelectasis. Sodium 134, potassium repeated from 5.3 now 4.6, LFTs normal, pro-bnp 3K, troponin less than 0.01, ABGs pH 7.3, pCO2 35, pO2 131, wbc 11, hgb 17, platelets 062A. Pt is admitted to hospitalist service for further evaluation and treatment. Past Medical History:        Diagnosis Date    COPD (chronic obstructive pulmonary disease) ([facility] [address]        Past Surgical History:    No past surgical history on file. Home Medications:  Prior to Admission medications    Medication Sig Start Date End Date Taking?  Authorizing Provider   albuterol sulfate HFA (VENTOLIN HFA) 108 (90 Base) MCG/ACT inhaler Inhale 2 puffs into the lungs every 6 hours as needed for Wheezing    Historical Provider, MD   Budeson-Glycopyrrol-Formoterol (Clydia Earnest) 160-9-4.8 MCG/ACT AERO Inhale into the lungs    Historical Provider, MD       Allergies: Patient has no known allergies. Social History:    The patient currently lives with wife  Tobacco:   reports that he has quit smoking. He has never used smokeless tobacco.  Alcohol:   reports current alcohol use. Illicit Drugs: none    Family History:  No family history on file. Review of Systems:   Review of Systems   Constitutional: Positive for activity change and appetite change. Negative for fever. Respiratory: Positive for cough, shortness of breath and wheezing. Gastrointestinal: Negative for abdominal pain and vomiting. Neurological: Positive for weakness (global). All other systems reviewed and are negative. 14 point review of systems is negative except as specifically addressed above. Physical Examination:  /84   Pulse 116   Temp 98.8 °F (37.1 °C) (Oral)   Resp 23   SpO2 97%   Physical Exam  Vitals reviewed. Constitutional:       Comments: 75 yr old male appears in no respiratory distress with oxygen at 2L w/bipap in place    HENT:      Head: Normocephalic. Right Ear: External ear normal.      Left Ear: External ear normal.   Eyes:      Conjunctiva/sclera: Conjunctivae normal.      Pupils: Pupils are equal, round, and reactive to light. Neck:      Comments: No obvious JVD  Cardiovascular:      Rate and Rhythm: Regular rhythm. Tachycardia present. Heart sounds: Normal heart sounds. Pulmonary:      Effort: Pulmonary effort is normal.      Comments: Fine expiratory wheezing bilateral bases  Abdominal:      General: Bowel sounds are normal.      Palpations: Abdomen is soft. Tenderness: There is no abdominal tenderness. Musculoskeletal:         General: Normal range of motion. Cervical back: Normal range of motion. Comments: No lower extremity edema  Moves bilateral upper/lower extremities equally   Skin:     General: Skin is warm and dry. Neurological:      Mental Status: He is alert and oriented to person, place, and time. Diagnostic Data:  CBC:  Recent Labs     04/17/22  1118   WBC 10.8   HGB 16.5   HCT 51.3   *     BMP:  Recent Labs     04/17/22  1118 04/17/22  1129   *  --    K 5.3* 4.6   CL 94*  --    CO2 18*  --    BUN 18  --    CREATININE 1.0  --    CALCIUM 9.7  --      Recent Labs     04/17/22  1118   AST 22   ALT 12   BILITOT 0.7   ALKPHOS 69     Coag Panel:   Recent Labs     04/17/22  1118   INR 1.10   PROTIME 14.1   APTT 29.6     Cardiac Enzymes:   Recent Labs     04/17/22  1118   TROPONINI <0.01     ABGs:  Lab Results   Component Value Date    PHART 7.300 04/17/2022    PO2ART 131.0 04/17/2022    QHH2EER 35.0 04/17/2022     ABG:  Recent Labs     04/17/22  1129   PHART 7.300*   ENJ8TTT 35.0   PO2ART 131.0*   TZQ3OPT 17.2*   BEART -8.2*   HGBAE 17.2   J6HNREBS 96.2   CARBOXHGBART 1.3       XR CHEST PORTABLE    Result Date: 4/17/2022  XR CHEST PORTABLE 4/17/2022 11:48 AM HISTORY: Respiratory distress COMPARISON: 1/23/2022 CXR: A single frontal view of the chest is performed. Findings: The lungs are hyperinflated. Probable basilar atelectasis. No dense consolidation. No edema. Normal cardiac mediastinal contours. No pleural effusion or pneumothorax. No acute regional bony pathology. 1. Mild hyperinflation of the lungs with probable basilar atelectasis. . Signed by Dr Dixon Williams      Assessment/Plan:  Principal Problem:    COPD exacerbation ([facility] [address]  Active Problems:    Hypertension    Coronavirus infection    Rhinovirus infection    Hyperkalemia    Elevated brain natriuretic peptide (BNP) level  Resolved Problems:    * No resolved hospital problems.  *     Principal Problem:    COPD exacerbation/  Coronavirus infection/Rhinovirus infection   -monitor for increasing supplemental oxygen requirements   -goal sats >90%   -continue bipap/2L of oxygen   -duoneb breathing treatments q4hrs waalbuterol neb, continuous albuterol neb as  -solumedrol 40mg iv q6hrs   -incentive spirometry q4hrs wa   -abg as warranted   -echocardiogram   -etoh   -ua   -magnesium   -phosphorus   -vit d25 hyrdoxy   -daily weight   -I's and O's  Active Problems:    Elevated blood pressure/Hypertension   -monitor blood pressure   -hydralazine 5mg iv [id number] prn systolic EK>[id number] or diastolic JW>[id number]   Resolved Problems:    * No resolved hospital problems.  *  Signed:  ALIYAH Vargas CNP, 4/17/2022 1:44 PM

## 2022-04-17 NOTE — PROGRESS NOTES
Results for Disha Burnette (MRN [id number]) as of 4/17/2022 11:34   Ref. Range 4/17/2022 11:29   Hemoglobin, Art, Extended Latest Ref Range: 14.0 - 18.0 g/dL 17.2   pH, Arterial Latest Ref Range: 7.350 - 7.450  7.300 (L)   pCO2, Arterial Latest Ref Range: 35.0 - 45.0 mmHg 35.0   pO2, Arterial Latest Ref Range: 80.0 - 100.0 mmHg 131.0 (H)   HCO3, Arterial Latest Ref Range: 22.0 - 26.0 mmol/L 17.2 (L)   Base Excess, Arterial Latest Ref Range: -2.0 - 2.0 mmol/L -8.2 (L)   O2 Sat, Arterial Latest Ref Range: >92 % 96.2   O2 Content, Arterial Latest Ref Range: Not Established mL/dL 23.4   Methemoglobin, Arterial Latest Ref Range: <1.5 % 0.8   Carboxyhgb, Arterial Latest Ref Range: 0.0 - 5.0 % 1.3     Patient on 4 l/m nasal cannula. ABG drawn from RR, AT+. Placed patient on BiPAP 12/6 with O2 @ 4 l/m after ABG drawn due to increased work of breathing. Decreased O2 to 3 l/m after ABG results.

## 2022-04-18 LAB
ANION GAP SERPL CALCULATED.3IONS-SCNC: 27 MMOL/L (ref 7–19)
BASOPHILS ABSOLUTE: 0 K/UL (ref 0–0.2)
BASOPHILS RELATIVE PERCENT: 0.2 % (ref 0–1)
BUN BLDV-MCNC: 32 MG/DL (ref 8–23)
CALCIUM SERPL-MCNC: 9.8 MG/DL (ref 8.8–10.2)
CHLORIDE BLD-SCNC: 92 MMOL/L (ref 98–111)
CO2: 16 MMOL/L (ref 22–29)
CREAT SERPL-MCNC: 1.1 MG/DL (ref 0.5–1.2)
EKG P AXIS: 71 DEGREES
EKG P-R INTERVAL: 170 MS
EKG Q-T INTERVAL: 274 MS
EKG QRS DURATION: 86 MS
EKG QTC CALCULATION (BAZETT): 391 MS
EKG T AXIS: 63 DEGREES
EOSINOPHILS ABSOLUTE: 0 K/UL (ref 0–0.6)
EOSINOPHILS RELATIVE PERCENT: 0.1 % (ref 0–5)
GFR AFRICAN AMERICAN: >59
GFR NON-AFRICAN AMERICAN: >60
GLUCOSE BLD-MCNC: 99 MG/DL (ref 74–109)
HCT VFR BLD CALC: 51 % (ref 42–52)
HEMOGLOBIN: 16.6 G/DL (ref 14–18)
IMMATURE GRANULOCYTES #: 0.1 K/UL
LV EF: 58 %
LVEF MODALITY: NORMAL
LYMPHOCYTES ABSOLUTE: 0.6 K/UL (ref 1.1–4.5)
LYMPHOCYTES RELATIVE PERCENT: 7.1 % (ref 20–40)
MCH RBC QN AUTO: 31 PG (ref 27–31)
MCHC RBC AUTO-ENTMCNC: 32.5 G/DL (ref 33–37)
MCV RBC AUTO: 95.1 FL (ref 80–94)
MONOCYTES ABSOLUTE: 0.2 K/UL (ref 0–0.9)
MONOCYTES RELATIVE PERCENT: 2.3 % (ref 0–10)
NEUTROPHILS ABSOLUTE: 7.5 K/UL (ref 1.5–7.5)
NEUTROPHILS RELATIVE PERCENT: 89.3 % (ref 50–65)
PDW BLD-RTO: 13.6 % (ref 11.5–14.5)
PLATELET # BLD: 394 K/UL (ref 130–400)
PMV BLD AUTO: 9.5 FL (ref 9.4–12.4)
POTASSIUM REFLEX MAGNESIUM: 5.1 MMOL/L (ref 3.5–5)
RBC # BLD: 5.36 M/UL (ref 4.7–6.1)
SODIUM BLD-SCNC: 135 MMOL/L (ref 136–145)
WBC # BLD: 8.4 K/UL (ref 4.8–10.8)

## 2022-04-18 PROCEDURE — 6360000002 HC RX W HCPCS: Performed by: NURSE PRACTITIONER

## 2022-04-18 PROCEDURE — C8929 TTE W OR WO FOL WCON,DOPPLER: HCPCS

## 2022-04-18 PROCEDURE — 6360000002 HC RX W HCPCS: Performed by: STUDENT IN AN ORGANIZED HEALTH CARE EDUCATION/TRAINING PROGRAM

## 2022-04-18 PROCEDURE — 2580000003 HC RX 258: Performed by: NURSE PRACTITIONER

## 2022-04-18 PROCEDURE — 93010 ELECTROCARDIOGRAM REPORT: CPT | Performed by: INTERNAL MEDICINE

## 2022-04-18 PROCEDURE — 6370000000 HC RX 637 (ALT 250 FOR IP): Performed by: STUDENT IN AN ORGANIZED HEALTH CARE EDUCATION/TRAINING PROGRAM

## 2022-04-18 PROCEDURE — 2700000000 HC OXYGEN THERAPY PER DAY

## 2022-04-18 PROCEDURE — 94660 CPAP INITIATION&MGMT: CPT

## 2022-04-18 PROCEDURE — 6360000004 HC RX CONTRAST MEDICATION: Performed by: STUDENT IN AN ORGANIZED HEALTH CARE EDUCATION/TRAINING PROGRAM

## 2022-04-18 PROCEDURE — 1210000000 HC MED SURG R&B

## 2022-04-18 PROCEDURE — 94640 AIRWAY INHALATION TREATMENT: CPT

## 2022-04-18 PROCEDURE — 36415 COLL VENOUS BLD VENIPUNCTURE: CPT

## 2022-04-18 PROCEDURE — 85025 COMPLETE CBC W/AUTO DIFF WBC: CPT

## 2022-04-18 PROCEDURE — 80048 BASIC METABOLIC PNL TOTAL CA: CPT

## 2022-04-18 RX ORDER — AZITHROMYCIN 250 MG/1
250 TABLET, FILM COATED ORAL DAILY
Status: COMPLETED | OUTPATIENT
Start: 2022-04-18 | End: 2022-04-20

## 2022-04-18 RX ORDER — HYDRALAZINE HYDROCHLORIDE 20 MG/ML
10 INJECTION INTRAMUSCULAR; INTRAVENOUS EVERY 6 HOURS PRN
Status: DISCONTINUED | OUTPATIENT
Start: 2022-04-18 | End: 2022-04-21 | Stop reason: HOSPADM

## 2022-04-18 RX ORDER — PREDNISONE 20 MG/1
60 TABLET ORAL DAILY
Status: DISCONTINUED | OUTPATIENT
Start: 2022-04-19 | End: 2022-04-21 | Stop reason: HOSPADM

## 2022-04-18 RX ADMIN — METHYLPREDNISOLONE SODIUM SUCCINATE 40 MG: 40 INJECTION, POWDER, FOR SOLUTION INTRAMUSCULAR; INTRAVENOUS at 01:45

## 2022-04-18 RX ADMIN — IPRATROPIUM BROMIDE AND ALBUTEROL SULFATE 1 AMPULE: 2.5; .5 SOLUTION RESPIRATORY (INHALATION) at 06:28

## 2022-04-18 RX ADMIN — SODIUM CHLORIDE, PRESERVATIVE FREE 10 ML: 5 INJECTION INTRAVENOUS at 20:01

## 2022-04-18 RX ADMIN — IPRATROPIUM BROMIDE AND ALBUTEROL SULFATE 1 AMPULE: 2.5; .5 SOLUTION RESPIRATORY (INHALATION) at 10:16

## 2022-04-18 RX ADMIN — SODIUM CHLORIDE, PRESERVATIVE FREE 10 ML: 5 INJECTION INTRAVENOUS at 10:30

## 2022-04-18 RX ADMIN — AZITHROMYCIN MONOHYDRATE 250 MG: 250 TABLET ORAL at 10:30

## 2022-04-18 RX ADMIN — METHYLPREDNISOLONE SODIUM SUCCINATE 40 MG: 40 INJECTION, POWDER, FOR SOLUTION INTRAMUSCULAR; INTRAVENOUS at 12:18

## 2022-04-18 RX ADMIN — IPRATROPIUM BROMIDE AND ALBUTEROL SULFATE 1 AMPULE: 2.5; .5 SOLUTION RESPIRATORY (INHALATION) at 22:27

## 2022-04-18 RX ADMIN — IPRATROPIUM BROMIDE AND ALBUTEROL SULFATE 1 AMPULE: 2.5; .5 SOLUTION RESPIRATORY (INHALATION) at 14:49

## 2022-04-18 RX ADMIN — ARFORMOTEROL TARTRATE 15 MCG: 15 SOLUTION RESPIRATORY (INHALATION) at 18:58

## 2022-04-18 RX ADMIN — IPRATROPIUM BROMIDE AND ALBUTEROL SULFATE 1 AMPULE: 2.5; .5 SOLUTION RESPIRATORY (INHALATION) at 18:57

## 2022-04-18 RX ADMIN — BUDESONIDE 500 MCG: 0.5 SUSPENSION RESPIRATORY (INHALATION) at 18:58

## 2022-04-18 RX ADMIN — METHYLPREDNISOLONE SODIUM SUCCINATE 40 MG: 40 INJECTION, POWDER, FOR SOLUTION INTRAMUSCULAR; INTRAVENOUS at 06:26

## 2022-04-18 RX ADMIN — ENOXAPARIN SODIUM 40 MG: 40 INJECTION SUBCUTANEOUS at 12:18

## 2022-04-18 RX ADMIN — ARFORMOTEROL TARTRATE 15 MCG: 15 SOLUTION RESPIRATORY (INHALATION) at 06:32

## 2022-04-18 RX ADMIN — IPRATROPIUM BROMIDE AND ALBUTEROL SULFATE 1 AMPULE: 2.5; .5 SOLUTION RESPIRATORY (INHALATION) at 01:56

## 2022-04-18 RX ADMIN — BUDESONIDE 500 MCG: 0.5 SUSPENSION RESPIRATORY (INHALATION) at 06:32

## 2022-04-18 RX ADMIN — PERFLUTREN 1.5 ML: 6.52 INJECTION, SUSPENSION INTRAVENOUS at 08:29

## 2022-04-18 ASSESSMENT — PAIN SCALES - GENERAL: PAINLEVEL_OUTOF10: 0

## 2022-04-18 NOTE — PROGRESS NOTES
Daily Progress Note    Date:2022  Patient: Paty Ohara  : 1955  BBZ:[id number]  CODE:Full Code No additional code details  PCP:No primary care provider on file. Admit Date: 2022 11:17 AM   LOS: 1 day       CC: Shortness of breath      Subjective:   Events noted. Patient remains with significant dyspnea, wheezing and cough. No fevers or chills overnight. No chest pain or palpitations. No abdominal pain, nausea vomiting or diarrhea.       Review of Systems  14 point review of systems completed and is negative except as otherwise noted      Objective:      Vital signs in last 24 hours:  Patient Vitals for the past 24 hrs:   BP Temp Temp src Pulse Resp SpO2 Weight   22 1200 -- -- -- -- -- 94 % --   22 0541 (!) 145/92 97.3 °F (36.3 °C) Temporal 114 22 94 % 126 lb 4 oz (57.3 kg)   22 0001 131/80 98 °F (36.7 °C) Temporal 113 18 96 % --   22 1739 118/83 98.4 °F (36.9 °C) Temporal 115 18 98 % --   22 1503 (!) 183/108 98.6 °F (37 °C) Temporal 123 20 97 % --     Physical exam    General: No acute distress  Psych: Calm and cooperative  HEENT: NC/AT no scleral icterus  Cardiovascular: Regular rhythm, pulses 2+  Respiratory: Bilateral rhonchorous breath sounds with expiratory wheezes, no accessory muscle use  Abdomen: Soft, nontender, bowel tones present  Neurologic: Alert, follows commands, nonfocal  Extremities: No clubbing cyanosis or edema  Skin: Warm and dry      Lab Review   Recent Results (from the past 24 hour(s))   Ethanol    Collection Time: 22  7:45 PM   Result Value Ref Range    Ethanol Lvl <10 mg/dL   Basic Metabolic Panel w/ Reflex to MG    Collection Time: 22  4:17 AM   Result Value Ref Range    Sodium 135 (L) 136 - 145 mmol/L    Potassium reflex Magnesium 5.1 (H) 3.5 - 5.0 mmol/L    Chloride 92 (L) 98 - 111 mmol/L    CO2 16 (L) 22 - 29 mmol/L    Anion Gap 27 (H) 7 - 19 mmol/L    Glucose 99 74 - 109 mg/dL    BUN 32 (H) 8 - 23 mg/dL    CREATININE 1.1 0.5 - 1.2 mg/dL    GFR Non-African American >60 >60    GFR African American >59 >59    Calcium 9.8 8.8 - 10.2 mg/dL   CBC with Auto Differential    Collection Time: 04/18/22  4:17 AM   Result Value Ref Range    WBC 8.4 4.8 - 10.8 K/uL    RBC 5.36 4.70 - 6.10 M/uL    Hemoglobin 16.6 14.0 - 18.0 g/dL    Hematocrit 51.0 42.0 - 52.0 %    MCV 95.1 (H) 80.0 - 94.0 fL    MCH 31.0 27.0 - 31.0 pg    MCHC 32.5 (L) 33.0 - 37.0 g/dL    RDW 13.6 11.5 - 14.5 %    Platelets 669 480 - 074 K/uL    MPV 9.5 9.4 - 12.4 fL    Neutrophils % 89.3 (H) 50.0 - 65.0 %    Lymphocytes % 7.1 (L) 20.0 - 40.0 %    Monocytes % 2.3 0.0 - 10.0 %    Eosinophils % 0.1 0.0 - 5.0 %    Basophils % 0.2 0.0 - 1.0 %    Neutrophils Absolute 7.5 1.5 - 7.5 K/uL    Immature Granulocytes # 0.1 K/uL    Lymphocytes Absolute 0.6 (L) 1.1 - 4.5 K/uL    Monocytes Absolute 0.20 0.00 - 0.90 K/uL    Eosinophils Absolute 0.00 0.00 - 0.60 K/uL    Basophils Absolute 0.00 0.00 - 0.20 K/uL       I/O last 3 completed shifts: In: 10 [I.V.:10]  Out: 300 [Urine:300]  No intake/output data recorded.       Current Facility-Administered Medications:     [START ON 4/19/2022] predniSONE (DELTASONE) tablet 60 mg, 60 mg, Oral, Daily, Luigi Morton MD    azithromycin [facility]) tablet 250 mg, 250 mg, Oral, Daily, Luigi Morton MD, 250 mg at 04/18/22 1030    perflutren lipid microspheres (DEFINITY) injection 1.65 mg, 1.5 mL, IntraVENous, ONCE PRN, Luigi Morton MD, 1.5 mL at 04/18/22 0829    sodium chloride flush 0.9 % injection 5-40 mL, 5-40 mL, IntraVENous, 2 times per day, ALIYAH Mckeon - CNP, 10 mL at 04/18/22 1030    sodium chloride flush 0.9 % injection 5-40 mL, 5-40 mL, IntraVENous, PRN, ALIYAH Mckeon - CNP    0.9 % sodium chloride infusion, , IntraVENous, PRN, ALIYAH Mckeon - CNP    enoxaparin (LOVENOX) injection 40 mg, 40 mg, SubCUTAneous, Daily, ALIYAH Mckeon - CNP, 40 mg at 04/18/22 1218    ondansetron (ZOFRAN-ODT) disintegrating tablet 4 mg, 4 mg, Oral, Q8H PRN **OR** ondansetron (ZOFRAN) injection 4 mg, 4 mg, IntraVENous, Q6H PRN, Yamileth Plaster, APRN - CNP    polyethylene glycol (GLYCOLAX) packet 17 g, 17 g, Oral, Daily PRN, Yamileth Plaster, APRN - CNP    acetaminophen (TYLENOL) tablet 650 mg, 650 mg, Oral, Q6H PRN **OR** acetaminophen (TYLENOL) suppository 650 mg, 650 mg, Rectal, Q6H PRN, Yamileth Plaster, APRN - CNP    hydrALAZINE (APRESOLINE) injection 5 mg, 5 mg, IntraVENous, Q6H PRN, Yamileth Plaster, APRN - CNP, 5 mg at 04/17/22 1519    ipratropium-albuterol (DUONEB) nebulizer solution 1 ampule, 1 ampule, Inhalation, Q4H, Lynda Leyva MD, 1 ampule at 04/18/22 1449    Arformoterol Tartrate (BROVANA) nebulizer solution 15 mcg, 15 mcg, Nebulization, BID, Lynda Leyva MD, 15 mcg at 04/18/22 5832    budesonide (PULMICORT) nebulizer suspension 500 mcg, 0.5 mg, Nebulization, BID, Lynda Leyva MD, 500 mcg at 04/18/22 291        Assessment/plan  Principal Problem:    COPD exacerbation (HCC)  Active Problems:    Hypertension    Coronavirus infection    Rhinovirus infection    Hyperkalemia    Elevated brain natriuretic peptide (BNP) level  Resolved Problems:    * No resolved hospital problems.  *      Severe COPD with acute exacerbation secondary to URI  Acute hypoxic respiratory failure, secondary to above  Continue as needed supplemental O2 to maintain adequate gas exchange with goal SPO2 88-90%, wean O2 as able  Continue duo nebs, Brovana, Pulmicort  Steroids  Add azithromycin for anti-inflammatory effect  Aggressive pulmonary toileting    Hypertension  Monitor BP, continue current management with further adjustment as needed pending clinical course    DVT prophylaxis Lovenox        Lynda Leyva MD 4/18/2022 3:00 PM

## 2022-04-18 NOTE — CARE COORDINATION
Initial CM Assessment    Initial Assessment Completed at bedside with:    [x]   Patient  []   Family/Caregiver/Guardian   []   Other:      Patient Contact Information:  [address]  [phone] (home)   Above information verified? [x]   Yes  []   No    ADLS:    Independent   Support System:    spouse   Plan to return to current housing:   [x]   Yes  []   No    Transportation plan for Discharge:  Spouse     Do you have any unmet social needs that would keep you from returning home safely:  []   Yes  [x]   No              Unmet Social Needs Notes:       Had [facility] prior to admission:    []   Yes  [x]   No    Currently ACTIVE with Home Health CARE:    []   Yes  [x]   No  []   Interested at discharge  [facility]:      Current PCP:  No primary care provider on file. PCP verified? [x]   Yes  []   No    Pharmacy:    [facility] [address] [place] [address], [address] [place] [address],[address] [phone]  [address] [facility] [address]  Phone: [phone] Fax: [phone]    Prefer to use Meds to Bed? []   Yes  [x]   No  Potential assistance purchasing medications? []   Yes  [x]   No    Active with HD/PD prior to admission:           []   Yes  [x]   No  HD Center:       Financial:  Payor: Tawny Verdugo / Plan: MEDICARE PART A AND B / Product Type: *No Product type* /     Pre-Cert required for SNF:   []   Yes  [x]   No    Patient Deficits:  []   Yes   [x]   No    If yes:  []   Confusion/Memory  []   Visual  []   Motor/Sensory         []   Right arm         []   Right leg         []   Left arm         []   Left leg  []   Language/Speech         []   Aphasia         []   Dysarthria         []   Swallow         [place] Coma Scale  Eye Opening: Spontaneous  Best Verbal Response: Oriented  Best Motor Response: Obeys commands  Isai Coma Scale Score: 15    Patient Deficit Notes: Additional CM/SW Notes:   Pt is hopeful that he will not need oxygen at dc.  If needed, he does not have a preference of provider. Offered information on CHW program, pt is not interested in referral. He denies any further needs at this time.      Flavio Patriciaelsa and/or his family were provided with choice of provider:  [x]   Yes   []   No      [address]

## 2022-04-19 LAB
ANION GAP SERPL CALCULATED.3IONS-SCNC: 19 MMOL/L (ref 7–19)
BUN BLDV-MCNC: 62 MG/DL (ref 8–23)
CALCIUM SERPL-MCNC: 9.7 MG/DL (ref 8.8–10.2)
CHLORIDE BLD-SCNC: 96 MMOL/L (ref 98–111)
CO2: 19 MMOL/L (ref 22–29)
CREAT SERPL-MCNC: 1.5 MG/DL (ref 0.5–1.2)
GFR AFRICAN AMERICAN: 56
GFR NON-AFRICAN AMERICAN: 46
GLUCOSE BLD-MCNC: 117 MG/DL (ref 74–109)
POTASSIUM REFLEX MAGNESIUM: 5 MMOL/L (ref 3.5–5)
SODIUM BLD-SCNC: 134 MMOL/L (ref 136–145)

## 2022-04-19 PROCEDURE — 1210000000 HC MED SURG R&B

## 2022-04-19 PROCEDURE — 6370000000 HC RX 637 (ALT 250 FOR IP): Performed by: STUDENT IN AN ORGANIZED HEALTH CARE EDUCATION/TRAINING PROGRAM

## 2022-04-19 PROCEDURE — 94640 AIRWAY INHALATION TREATMENT: CPT

## 2022-04-19 PROCEDURE — 2580000003 HC RX 258: Performed by: NURSE PRACTITIONER

## 2022-04-19 PROCEDURE — 94660 CPAP INITIATION&MGMT: CPT

## 2022-04-19 PROCEDURE — 36415 COLL VENOUS BLD VENIPUNCTURE: CPT

## 2022-04-19 PROCEDURE — 6360000002 HC RX W HCPCS: Performed by: STUDENT IN AN ORGANIZED HEALTH CARE EDUCATION/TRAINING PROGRAM

## 2022-04-19 PROCEDURE — 80048 BASIC METABOLIC PNL TOTAL CA: CPT

## 2022-04-19 PROCEDURE — 2700000000 HC OXYGEN THERAPY PER DAY

## 2022-04-19 RX ADMIN — IPRATROPIUM BROMIDE AND ALBUTEROL SULFATE 1 AMPULE: 2.5; .5 SOLUTION RESPIRATORY (INHALATION) at 22:21

## 2022-04-19 RX ADMIN — ARFORMOTEROL TARTRATE 15 MCG: 15 SOLUTION RESPIRATORY (INHALATION) at 06:15

## 2022-04-19 RX ADMIN — BUDESONIDE 500 MCG: 0.5 SUSPENSION RESPIRATORY (INHALATION) at 18:49

## 2022-04-19 RX ADMIN — SODIUM CHLORIDE, PRESERVATIVE FREE 10 ML: 5 INJECTION INTRAVENOUS at 09:20

## 2022-04-19 RX ADMIN — SODIUM CHLORIDE, PRESERVATIVE FREE 10 ML: 5 INJECTION INTRAVENOUS at 21:18

## 2022-04-19 RX ADMIN — IPRATROPIUM BROMIDE AND ALBUTEROL SULFATE 1 AMPULE: 2.5; .5 SOLUTION RESPIRATORY (INHALATION) at 02:43

## 2022-04-19 RX ADMIN — BUDESONIDE 500 MCG: 0.5 SUSPENSION RESPIRATORY (INHALATION) at 06:15

## 2022-04-19 RX ADMIN — IPRATROPIUM BROMIDE AND ALBUTEROL SULFATE 1 AMPULE: 2.5; .5 SOLUTION RESPIRATORY (INHALATION) at 19:00

## 2022-04-19 RX ADMIN — AZITHROMYCIN MONOHYDRATE 250 MG: 250 TABLET ORAL at 07:47

## 2022-04-19 RX ADMIN — PREDNISONE 60 MG: 20 TABLET ORAL at 07:47

## 2022-04-19 RX ADMIN — IPRATROPIUM BROMIDE AND ALBUTEROL SULFATE 1 AMPULE: 2.5; .5 SOLUTION RESPIRATORY (INHALATION) at 06:15

## 2022-04-19 RX ADMIN — IPRATROPIUM BROMIDE AND ALBUTEROL SULFATE 1 AMPULE: 2.5; .5 SOLUTION RESPIRATORY (INHALATION) at 10:10

## 2022-04-19 RX ADMIN — IPRATROPIUM BROMIDE AND ALBUTEROL SULFATE 1 AMPULE: 2.5; .5 SOLUTION RESPIRATORY (INHALATION) at 14:00

## 2022-04-19 RX ADMIN — ARFORMOTEROL TARTRATE 15 MCG: 15 SOLUTION RESPIRATORY (INHALATION) at 18:49

## 2022-04-19 ASSESSMENT — ENCOUNTER SYMPTOMS
SHORTNESS OF BREATH: 1
COUGH: 1

## 2022-04-19 NOTE — PROGRESS NOTES
[facility] Hospitalists      Patient:  Katy Odom  YOB: 1955  Date of Service: 4/19/2022  MRN: [id number]   Acct: [de-identified]   Primary Care Physician: No primary care provider on file. Advance Directive: Full Code  Admit Date: 4/17/2022       Hospital Day: 2  Portions of this note have been copied forward, however, changed to reflect the most current clinical status of this patient. CHIEF COMPLAINT Shortness of breath    SUBJECTIVE:  Continue with shortness of breath with activity, no fevers or vomiting tells me that his oxygen has been weaned and discontinued at this point. [address] COURSE: The patient is a 75 y.o. male who presented to [facility] ED complaining of shortness of breath. Pt has history of COPD. He reports over past 2 weeks gradual increasing shortness of breath, cough, wheezing and sputum production from baseline copd symptoms. He tells me that his wife has had recent \"cold\".      He has had decreased po intake of food and fluids over past few days. He denies known history of chf. He has had no lower extremity edema or orthopnea. He has had no abdominal pain, vomiting or diarrhea.      In ED, respiratory PCR panel positive for coronavirus OC43, human rhinovirus. He was initially hypoxic with sats in the 80's en route having been given duoneb and solumedrol. He was placed on bipap and given SL NTG and lasix 60mg with improvement in blood pressure from initial reading of 201/150     CXR-mild hyperinflation with probable basilar atelectasis. Sodium 134, potassium repeated from 5.3 now 4.6, LFTs normal, pro-bnp 3K, troponin less than 0.01, ABGs pH 7.3, pCO2 35, pO2 131, wbc 11, hgb 17, platelets 270W. Pt is admitted to hospitalist service for further evaluation and treatment. He is no longer requiring supplemental oxygen and continues with azithromycin and aggressive pulmonary toilet measures. Review of Systems:   Review of Systems   Constitutional: Negative for fever. Respiratory: Positive for cough and shortness of breath. Neurological: Positive for weakness (global). All other systems reviewed and are negative. 14 point review of systems is negative except as specifically addressed above. Objective:   VITALS:  /71   Pulse 103   Temp 97.5 °F (36.4 °C)   Resp 20   Wt 126 lb 4 oz (57.3 kg)   SpO2 96%   BMI 20.38 kg/m²   24HR INTAKE/OUTPUT:      Intake/Output Summary (Last 24 hours) at 4/19/2022 0814  Last data filed at 4/19/2022 0537  Gross per 24 hour   Intake 360 ml   Output 100 ml   Net 260 ml       Physical Exam  Vitals reviewed. Constitutional:       Comments: 75 yr old male with sp02 reading per nurse of 88% on room air   HENT:      Head: Normocephalic. Right Ear: External ear normal.      Left Ear: External ear normal.   Eyes:      Conjunctiva/sclera: Conjunctivae normal.      Pupils: Pupils are equal, round, and reactive to light. Cardiovascular:      Rate and Rhythm: Normal rate and regular rhythm. Heart sounds: Normal heart sounds. Pulmonary:      Effort: Pulmonary effort is normal.      Comments: Decreased breath sounds bilateral bases  Abdominal:      General: Bowel sounds are normal.      Palpations: Abdomen is soft. Tenderness: There is no abdominal tenderness. Musculoskeletal:         General: Normal range of motion. Cervical back: Normal range of motion. Comments: No obvious lower extremity edema  Moves bilateral lower extremities equally   Skin:     General: Skin is warm and dry. Neurological:      Mental Status: He is alert and oriented to person, place, and time.              Medications:      sodium chloride        predniSONE  60 mg Oral Daily    azithromycin  250 mg Oral Daily    sodium chloride flush  5-40 mL IntraVENous 2 times per day    enoxaparin  40 mg SubCUTAneous Daily    ipratropium-albuterol  1 ampule Inhalation Q4H    Arformoterol Tartrate  15 mcg Nebulization BID    budesonide 0.5 mg Nebulization BID     perflutren lipid microspheres, hydrALAZINE, sodium chloride flush, sodium chloride, ondansetron **OR** ondansetron, polyethylene glycol, acetaminophen **OR** acetaminophen  ADULT DIET; Regular; No Added Salt (3-4 gm)  ADULT ORAL NUTRITION SUPPLEMENT; Breakfast, Lunch, Dinner; Clear Liquid Oral Supplement     Lab and other Data:     Recent Labs     04/17/22  1118 04/18/22  0417   WBC 10.8 8.4   HGB 16.5 16.6   * 394     Recent Labs     04/17/22  1118 04/17/22  1129 04/18/22  0417 04/19/22  0312   *  --  135* 134*   K 5.3* 4.6 5.1* 5.0   CL 94*  --  92* 96*   CO2 18*  --  16* 19*   BUN 18  --  32* 62*   CREATININE 1.0  --  1.1 1.5*   GLUCOSE 121*  --  99 117*     Recent Labs     04/17/22  1118   AST 22   ALT 12   BILITOT 0.7   ALKPHOS 69     Troponin T:   Recent Labs     04/17/22  1118   TROPONINI <0.01     Pro-BNP: No results for input(s): BNP in the last 72 hours. INR:   Recent Labs     04/17/22  1118   INR 1.10       ABG:  Recent Labs     04/17/22  1129   PHART 7.300*   MIK5YZL 35.0   PO2ART 131.0*   RGO4CNB 17.2*   BEART -8.2*   HGBAE 17.2   S8WWQDTA 96.2   CARBOXHGBART 1.3       RAD:   XR CHEST PORTABLE    Result Date: 4/17/2022  1. Mild hyperinflation of the lungs with probable basilar atelectasis. . Signed by Dr Courtney Wiseman         Assessment/Plan   Principal Problem:    COPD exacerbation [facility])  Active Problems:    Hypertension    Coronavirus infection    Rhinovirus infection    Hyperkalemia    Elevated brain natriuretic peptide (BNP) level  Resolved Problems:    * No resolved hospital problems. *        Principal Problem:    COPD exacerbation ([facility] [address]  Active Problems:    Hypertension    Coronavirus infection    Rhinovirus infection    Hyperkalemia    Elevated brain natriuretic peptide (BNP) level  Resolved Problems:    * No resolved hospital problems.  *     COPD exacerbation/Coronavirus infection/Rhinovirus infection/Acute hypoxic respiratory failure -monitor for increasing supplemental oxygen requirements              -goal sats >90%              -continue duoneb breathing treatments    -continue Brovana and Pulmicort    -continue azithromycin 250mg po daily   -prednisone 60mg po daily              -incentive spirometry q4hrs wa              -abg as warranted              -echocardiogram              -daily weight              -I's and O's  Active Problems:    Elevated blood pressure/Hypertension              -monitor blood pressure              -hydralazine 5mg iv [id number] prn systolic KI>312EGIN or diastolic SC>[id number]        ALIYAH Deleon - CNP, 4/19/2022 8:14 AM

## 2022-04-20 PROCEDURE — 6370000000 HC RX 637 (ALT 250 FOR IP): Performed by: STUDENT IN AN ORGANIZED HEALTH CARE EDUCATION/TRAINING PROGRAM

## 2022-04-20 PROCEDURE — 6360000002 HC RX W HCPCS: Performed by: NURSE PRACTITIONER

## 2022-04-20 PROCEDURE — 6360000002 HC RX W HCPCS: Performed by: STUDENT IN AN ORGANIZED HEALTH CARE EDUCATION/TRAINING PROGRAM

## 2022-04-20 PROCEDURE — 94640 AIRWAY INHALATION TREATMENT: CPT

## 2022-04-20 PROCEDURE — 94660 CPAP INITIATION&MGMT: CPT

## 2022-04-20 PROCEDURE — 1210000000 HC MED SURG R&B

## 2022-04-20 PROCEDURE — 94761 N-INVAS EAR/PLS OXIMETRY MLT: CPT

## 2022-04-20 PROCEDURE — 2580000003 HC RX 258: Performed by: NURSE PRACTITIONER

## 2022-04-20 PROCEDURE — 2700000000 HC OXYGEN THERAPY PER DAY

## 2022-04-20 RX ORDER — AZITHROMYCIN 250 MG/1
250 TABLET, FILM COATED ORAL DAILY
Qty: 3 TABLET | Refills: 0 | Status: SHIPPED | OUTPATIENT
Start: 2022-04-20 | End: 2022-04-23

## 2022-04-20 RX ORDER — PREDNISONE 20 MG/1
TABLET ORAL
Qty: 24 TABLET | Refills: 0 | Status: SHIPPED | OUTPATIENT
Start: 2022-04-21 | End: 2022-05-04

## 2022-04-20 RX ADMIN — ARFORMOTEROL TARTRATE 15 MCG: 15 SOLUTION RESPIRATORY (INHALATION) at 06:06

## 2022-04-20 RX ADMIN — IPRATROPIUM BROMIDE AND ALBUTEROL SULFATE 1 AMPULE: 2.5; .5 SOLUTION RESPIRATORY (INHALATION) at 22:39

## 2022-04-20 RX ADMIN — PREDNISONE 60 MG: 20 TABLET ORAL at 08:44

## 2022-04-20 RX ADMIN — IPRATROPIUM BROMIDE AND ALBUTEROL SULFATE 1 AMPULE: 2.5; .5 SOLUTION RESPIRATORY (INHALATION) at 01:34

## 2022-04-20 RX ADMIN — AZITHROMYCIN MONOHYDRATE 250 MG: 250 TABLET ORAL at 08:44

## 2022-04-20 RX ADMIN — SODIUM CHLORIDE, PRESERVATIVE FREE 10 ML: 5 INJECTION INTRAVENOUS at 08:44

## 2022-04-20 RX ADMIN — BUDESONIDE 500 MCG: 0.5 SUSPENSION RESPIRATORY (INHALATION) at 06:06

## 2022-04-20 RX ADMIN — IPRATROPIUM BROMIDE AND ALBUTEROL SULFATE 1 AMPULE: 2.5; .5 SOLUTION RESPIRATORY (INHALATION) at 15:16

## 2022-04-20 RX ADMIN — IPRATROPIUM BROMIDE AND ALBUTEROL SULFATE 1 AMPULE: 2.5; .5 SOLUTION RESPIRATORY (INHALATION) at 06:04

## 2022-04-20 RX ADMIN — ENOXAPARIN SODIUM 40 MG: 40 INJECTION SUBCUTANEOUS at 12:02

## 2022-04-20 RX ADMIN — IPRATROPIUM BROMIDE AND ALBUTEROL SULFATE 1 AMPULE: 2.5; .5 SOLUTION RESPIRATORY (INHALATION) at 19:00

## 2022-04-20 RX ADMIN — IPRATROPIUM BROMIDE AND ALBUTEROL SULFATE 1 AMPULE: 2.5; .5 SOLUTION RESPIRATORY (INHALATION) at 10:08

## 2022-04-20 RX ADMIN — BUDESONIDE 500 MCG: 0.5 SUSPENSION RESPIRATORY (INHALATION) at 19:08

## 2022-04-20 RX ADMIN — ARFORMOTEROL TARTRATE 15 MCG: 15 SOLUTION RESPIRATORY (INHALATION) at 19:08

## 2022-04-20 ASSESSMENT — ENCOUNTER SYMPTOMS
COUGH: 1
SHORTNESS OF BREATH: 1

## 2022-04-20 NOTE — PROGRESS NOTES
1858 P.O. on R/A @ Rest Sat was 91%  1000 P. O. on R/A @ Exercise Sat was 88% pt walked to door when Sat dropped. 200 P.O. on NC @ 2 LPM @ Exercise Sat was 92%  1005 P. O. on NC @ 2 LPM @ Rest Sat was 93%

## 2022-04-20 NOTE — PLAN OF CARE
Problem: Infection:  Goal: Will remain free from infection  Description: Will remain free from infection  Outcome: Progressing     Problem: Safety:  Goal: Free from accidental physical injury  Description: Free from accidental physical injury  Outcome: Progressing  Goal: Free from intentional harm  Description: Free from intentional harm  Outcome: Progressing     Problem: Daily Care:  Goal: Daily care needs are met  Description: Daily care needs are met  Outcome: Progressing     Problem: Pain:  Goal: Patient's pain/discomfort is manageable  Description: Patient's pain/discomfort is manageable  Outcome: Progressing     Problem: Skin Integrity:  Goal: Skin integrity will stabilize  Description: Skin integrity will stabilize  Outcome: Progressing     Problem: Discharge Planning:  Goal: Patients continuum of care needs are met  Description: Patients continuum of care needs are met  Outcome: Progressing     Problem: Falls - Risk of:  Goal: Will remain free from falls  Description: Will remain free from falls  Outcome: Progressing  Goal: Absence of physical injury  Description: Absence of physical injury  Outcome: Progressing     Problem: Pain  Goal: Verbalizes/displays adequate comfort level or baseline comfort level  Outcome: Progressing

## 2022-04-20 NOTE — CARE COORDINATION
o2 order sent to [facility]. They will bring pt a portable tank for dc today.    [facility]   P  [phone] [id number]  Electronically signed by Tracy Bowen on 4/20/2022 at 12:49 PM

## 2022-04-20 NOTE — PROGRESS NOTES
[facility] Hospitalists      Patient:  Sharon Slade  YOB: 1955  Date of Service: 4/20/2022  MRN: [id number]   Acct: [de-identified]   Primary Care Physician: No primary care provider on file. Advance Directive: Full Code  Admit Date: 4/17/2022       Hospital Day: 3        CHIEF COMPLAINT Shortness of breath    SUBJECTIVE:   Improved over hospital course but tells me activity significantly limited due to his dyspnea with exertion. He does require supplemental oxygen, but does not like the prospect of using it at home. Continues to have intermittent cough. No fevers or chills. Remains with fatigue/generalized weakness. CUMULATIVE HOSPITAL COURSE: The patient is a 75 y.o. male who presented to [address] [place] [address] ED complaining of shortness of breath. Pt has history of COPD. He reports over past 2 weeks gradual increasing shortness of breath, cough, wheezing and sputum production from baseline copd symptoms. He tells me that his wife has had recent \"cold\".      He has had decreased po intake of food and fluids over past few days. He denies known history of chf. He has had no lower extremity edema or orthopnea. He has had no abdominal pain, vomiting or diarrhea.      In ED, respiratory PCR panel positive for coronavirus OC43, human rhinovirus. He was initially hypoxic with sats in the 80's en route having been given duoneb and solumedrol. He was placed on bipap and given SL NTG and lasix 60mg with improvement in blood pressure from initial reading of 201/150     CXR-mild hyperinflation with probable basilar atelectasis. Sodium 134, potassium repeated from 5.3 now 4.6, LFTs normal, pro-bnp 3K, troponin less than 0.01, ABGs pH 7.3, pCO2 35, pO2 131, wbc 11, hgb 17, platelets 811E. He was admitted for COPD exacerbation managed on nebulized bronchodilators, steroids and azithromycin. Review of Systems:   Review of Systems   Constitutional: Negative for fever.    Respiratory: Positive for cough and shortness of breath. Neurological: Positive for weakness (global). All other systems reviewed and are negative. 14 point review of systems is negative except as specifically addressed above. Objective:   VITALS:  /76   Pulse 86   Temp 97.3 °F (36.3 °C) (Temporal)   Resp 20   Wt 126 lb 4 oz (57.3 kg)   SpO2 93%   BMI 20.38 kg/m²   24HR INTAKE/OUTPUT:      Intake/Output Summary (Last 24 hours) at 4/20/2022 1522  Last data filed at 4/20/2022 1235  Gross per 24 hour   Intake 240 ml   Output 400 ml   Net -160 ml       Physical exam    General: Fatigued appearing, no distress  HEENT: NC/AT, no scleral icterus  Psych: Calm and cooperative  Cardiovascular: Normal rate, regular rhythm, pulses equal  Respiratory: Coarse breath sounds with some expiratory wheezes but improved  Abdomen: Soft, nontender, nondistended, bowel sounds present  Neurologic: Alert, follows commands, nonfocal  Extremities: No clubbing cyanosis or edema  Skin: Warm and dry, well perfused      Medications:      sodium chloride        predniSONE  60 mg Oral Daily    sodium chloride flush  5-40 mL IntraVENous 2 times per day    enoxaparin  40 mg SubCUTAneous Daily    ipratropium-albuterol  1 ampule Inhalation Q4H    Arformoterol Tartrate  15 mcg Nebulization BID    budesonide  0.5 mg Nebulization BID     hydrALAZINE, sodium chloride flush, sodium chloride, ondansetron **OR** ondansetron, polyethylene glycol, acetaminophen **OR** acetaminophen  ADULT ORAL NUTRITION SUPPLEMENT; Breakfast, Lunch, Dinner; Clear Liquid Oral Supplement  ADULT DIET; Regular; No Added Salt (3-4 gm);  Safety Tray; Safety Tray (Disposables)     Lab and other Data:     Recent Labs     04/18/22  0417   WBC 8.4   HGB 16.6        Recent Labs     04/18/22 0417 04/19/22  0312   * 134*   K 5.1* 5.0   CL 92* 96*   CO2 16* 19*   BUN 32* 62*   CREATININE 1.1 1.5*   GLUCOSE 99 117*     No results for input(s): AST, ALT, ALB, BILITOT, ALKPHOS in the last 72 hours.  Troponin T:   No results for input(s): TROPONINI in the last 72 hours. Pro-BNP: No results for input(s): BNP in the last 72 hours. INR:   No results for input(s): INR in the last 72 hours. ABG:  No results for input(s): PHART, HWE1BIH, PO2ART, GSY8TND, BEART, HGBAE, J3QETLHL, CARBOXHGBART in the last 72 hours. RAD:   XR CHEST PORTABLE    Result Date: 4/17/2022  1. Mild hyperinflation of the lungs with probable basilar atelectasis. . Signed by Dr Rona Shultz         Assessment/Plan   Principal Problem:    COPD exacerbation [facility])  Active Problems:    Hypertension    Coronavirus infection    Rhinovirus infection    Hyperkalemia    Elevated brain natriuretic peptide (BNP) level  Resolved Problems:    * No resolved hospital problems.  *         COPD exacerbation due to viral URI  Acute hypoxic respiratory failure, secondary to above              -monitor for increasing supplemental oxygen requirements              -goal sats 88-92%              -continue DuoNebs   -continue Brovana and Pulmicort    -continue azithromycin for anti-inflammatory effect   -prednisone               -incentive spirometry     Echo reviewed                Hypertension  Monitor BP and continue current management with further adjustment as needed    DVT prophylaxis Lovenox    Home O2 assessment-qualifies for 2 L       Ashley Rosario MD, 4/20/2022 3:22 PM

## 2022-04-21 VITALS
OXYGEN SATURATION: 96 % | SYSTOLIC BLOOD PRESSURE: 118 MMHG | DIASTOLIC BLOOD PRESSURE: 68 MMHG | BODY MASS INDEX: 20.38 KG/M2 | HEART RATE: 77 BPM | TEMPERATURE: 97.2 F | RESPIRATION RATE: 16 BRPM | WEIGHT: 126.25 LBS

## 2022-04-21 LAB
ANION GAP SERPL CALCULATED.3IONS-SCNC: 15 MMOL/L (ref 7–19)
BUN BLDV-MCNC: 46 MG/DL (ref 8–23)
CALCIUM SERPL-MCNC: 9.3 MG/DL (ref 8.8–10.2)
CHLORIDE BLD-SCNC: 94 MMOL/L (ref 98–111)
CO2: 22 MMOL/L (ref 22–29)
CREAT SERPL-MCNC: 0.8 MG/DL (ref 0.5–1.2)
GFR AFRICAN AMERICAN: >59
GFR NON-AFRICAN AMERICAN: >60
GLUCOSE BLD-MCNC: 112 MG/DL (ref 74–109)
POTASSIUM REFLEX MAGNESIUM: 5.2 MMOL/L (ref 3.5–5)
SODIUM BLD-SCNC: 131 MMOL/L (ref 136–145)

## 2022-04-21 PROCEDURE — 94640 AIRWAY INHALATION TREATMENT: CPT

## 2022-04-21 PROCEDURE — 6370000000 HC RX 637 (ALT 250 FOR IP): Performed by: STUDENT IN AN ORGANIZED HEALTH CARE EDUCATION/TRAINING PROGRAM

## 2022-04-21 PROCEDURE — 36415 COLL VENOUS BLD VENIPUNCTURE: CPT

## 2022-04-21 PROCEDURE — 94660 CPAP INITIATION&MGMT: CPT

## 2022-04-21 PROCEDURE — 6360000002 HC RX W HCPCS: Performed by: STUDENT IN AN ORGANIZED HEALTH CARE EDUCATION/TRAINING PROGRAM

## 2022-04-21 PROCEDURE — 2700000000 HC OXYGEN THERAPY PER DAY

## 2022-04-21 PROCEDURE — 97165 OT EVAL LOW COMPLEX 30 MIN: CPT

## 2022-04-21 PROCEDURE — 80048 BASIC METABOLIC PNL TOTAL CA: CPT

## 2022-04-21 PROCEDURE — 97535 SELF CARE MNGMENT TRAINING: CPT

## 2022-04-21 RX ADMIN — PREDNISONE 60 MG: 20 TABLET ORAL at 09:21

## 2022-04-21 RX ADMIN — IPRATROPIUM BROMIDE AND ALBUTEROL SULFATE 1 AMPULE: 2.5; .5 SOLUTION RESPIRATORY (INHALATION) at 14:15

## 2022-04-21 RX ADMIN — IPRATROPIUM BROMIDE AND ALBUTEROL SULFATE 1 AMPULE: 2.5; .5 SOLUTION RESPIRATORY (INHALATION) at 06:09

## 2022-04-21 RX ADMIN — IPRATROPIUM BROMIDE AND ALBUTEROL SULFATE 1 AMPULE: 2.5; .5 SOLUTION RESPIRATORY (INHALATION) at 10:09

## 2022-04-21 RX ADMIN — IPRATROPIUM BROMIDE AND ALBUTEROL SULFATE 1 AMPULE: 2.5; .5 SOLUTION RESPIRATORY (INHALATION) at 02:01

## 2022-04-21 RX ADMIN — BUDESONIDE 500 MCG: 0.5 SUSPENSION RESPIRATORY (INHALATION) at 06:17

## 2022-04-21 RX ADMIN — ARFORMOTEROL TARTRATE 15 MCG: 15 SOLUTION RESPIRATORY (INHALATION) at 06:17

## 2022-04-21 NOTE — PROGRESS NOTES
Occupational Therapy Initial Assessment  Date: 2022   Patient Name: Kuldip Garcia  MRN: [id number]     : 1955    Date of Service: 2022    Discharge Recommendations:  24 hour supervision or assist  OT Equipment Recommendations  Equipment Needed:  (suggested use of tub bench)    Assessment   Assessment: OT evaluation and tx completed. Pt does not display any overt deficits that would warrant further OT services in this setting. Pt's tentative D/C is this date; pt found to be within D/C process upon eval. OT recommends initial 24/7 supervision/assist d/t increased fall risk from low endurance. Pt/spouse have been educated over general methods to increase endurance according to pt's tolerance. OT does not foresee any environmental barriers to D/C home once medically cleared. REQUIRES OT FOLLOW-UP: No  Activity Tolerance  Activity Tolerance: Patient Tolerated treatment well  Activity Tolerance: on 2L O2 via NC throughout              Patient Diagnosis(es): The primary encounter diagnosis was Acute respiratory failure with hypoxemia ([facility] Diagnoses of Hypertensive emergency, Elevated brain natriuretic peptide (BNP) level, Rhinovirus infection, and COPD exacerbation ([address] were also pertinent to this visit.              Restrictions  Restrictions/Precautions  Restrictions/Precautions: Contact Precautions,Isolation (RHINOVIRUS; COVID-19)  Required Braces or Orthoses?: No    Subjective   General  Chart Reviewed: Yes  Patient assessed for rehabilitation services?: Yes  Additional Pertinent Hx: CVA-like symptoms; etOH abuse; COPD  Family / Caregiver Present: Yes (wife)  Diagnosis: COPD exacerbation; rhinovirus elevated BNP levels; COVID-19; HTN  Pre Treatment Pain Screening  Pain at present: 0  Scale Used: Numeric Score  Intervention List: Patient able to continue with treatment  Vital Signs  Temp: 97.2 °F (36.2 °C)  Temp Source: Temporal  Pulse: 77  Heart Rate Source: Monitor  BP: 118/68  BP Location: Right upper arm  MAP (Calculated): 84.67  Level of Consciousness: Alert (0)  Oxygen Therapy  SpO2: 96 %  O2 Device: Nasal cannula  O2 Flow Rate (L/min): 2 L/min    Social/Functional History  Social/Functional History  Lives With: Spouse  Type of Home: House  Bathroom Shower/Tub: Tub/Shower unit  Home Equipment:  (no DME use before)  ADL Assistance: Independent  Ambulation Assistance: Independent  Transfer Assistance: Independent       Objective      Vision Exceptions: Wears glasses at all times  Hearing: Within functional limits          Balance  Sitting Balance: Independent  Standing Balance: Supervision  Functional Mobility  Functional - Mobility Device: No device  Assist Level: Stand by assistance  ADL  Feeding: Independent;Setup  Grooming: Independent;Setup  UE Bathing: Modified independent   LE Bathing: Supervision  UE Dressing: Independent  LE Dressing: Supervision  Toileting: Supervision        Bed mobility  Supine to Sit: Independent  Sit to Supine: Independent  Transfers  Sit to stand: Stand by assistance  Stand to sit: Stand by assistance     Cognition  Overall Cognitive Status: WNL               Gross Assessment  AROM: Within functional limits  Strength: Within functional limits  AROM: Within functional limits  Strength: Within functional limits                    Included Treatment  Tx consisted of: bed mobility; extensive pt/family education; transfer training; DME/AE discussion; activity tolerance/balance challenges; and brief functional ambulation. (Treatment time: 30 min)        Plan   Plan  Plan Comment: N/A; EVAL ONLY    Goals  Short Term Goals  Short Term Goal 1: Pt/family will verbalize/demo: AE/DME options; recommended therapeutic activities; homemaking/IADL strategies; energy conservation techniques; and fall prevention strategies (GOAL MET).                SHAYNE Botello/L  Electronically signed by Anshul BELLA/L on 4/21/2022 at 2:25 PM.

## 2022-04-21 NOTE — DISCHARGE SUMMARY
Discharge Summary    NAME: Ced Blair  :  1955  MRN:  [id number]    Admit date:  2022  Discharge date:  22    Advance Directive: Full Code      Primary Care Physician:  No primary care provider on file. Discharge Diagnoses:  Principal Problem:    COPD exacerbation ([address]  Active Problems:    Hypertension    Coronavirus infection    Rhinovirus infection    Hyperkalemia         Significant Diagnostic Studies:   ECHO Complete 2D W Doppler W Color    Result Date: 2022  Transthoracic Echocardiography Report (TTE)  Demographics   Patient Name  Josselin MICHAEL Date of Study          2022   MRN           [id number]        Gender                 Male   Date of Birth 1955    Room Number            L-0415   Age           75 year(s)   Height:       66 inches     Referring Physician    Alisia Blankenship   Weight:       126 pounds    Sonographer            Apryl Kirkland RDCS   BSA:          1.64 m^2      Interpreting Physician Genette Bloch MD   BMI:          20.34 kg/m^2  Procedure Type of Study   TTE procedure:ECHO 2D W/DOPPLER/COLOR/CONTRAST. Study Location: Echo Lab Technical Quality: Limited visualization due to lung interface. Patient Status: Inpatient Contrast Medium: Definity. Amount - 4 ml HR: 102 bpm BP: 145/92 mmHg Indications:Dyspnea/SOB.   Conclusions   Summary  Technically difficult study with limited views due to poor acoustic window  in conjunction with COPD  Normal left ventricular size and systolic function EF 55 to 60%  Normal left ventricular wall thickness with mild [grade 1] diastolic  dysfunction  Normal left atrial size  Poor visualization of what appears to be a mildly thickened aortic valve  without stenosis or insufficiency  Poor visualization of a normally mobile mitral valve with trace  regurgitation poor visualization of the mitral valve which appears to  maintain its mobility and offers trace regurgitation  Nonvisualization of the pulmonic valve  Right-sided chambers are normal size with preserved RV systolic function  Poor visualization of the tricuspid valve without evidence of  regurgitation  IVC dimension and inspiratory motion are normal consistent with normal  right atrial filling pressures  Aortic root and ascending segment measure within normal limits  Small anterior pericardial effusion  Definity contrast utilized to better define endocardial borders   Signature   ----------------------------------------------------------------  Electronically signed by Emma Cartagena MD(Interpreting physician)  on 04/18/2022 10:38 AM  ----------------------------------------------------------------  2D Measurements and Calculations(cm)   LVIDd: 4.29 cm                      LVIDs: 3.52 cm  IVSd: 1 cm  LVPWd: 0.97 cm                      AO Root Dimension: 2 cm  Rt. Vent.  Dimension: 2.63 cm        LA Dimension: 2.8 cm  % Ejection Fraction: 60 %           LA Area: 9.49 cm^2  LA Volume: 18.3 ml                  LV Systolic dimension: 4.39EB  LA Volume Index: 11 ml/m^2          LV PW diastolic: 4.64HR  LV dimension: 4.29 cm               LVOT diameter: 2 cm                                      RV Diastolic dimension: 8.86TR  LVEDV: 85.2 ml                      LVEDVI: 52 ml/m^2  LVESV:23.2 ml                       LVESVI: 14 ml/m^2  Cardic Output (CO): 3.78l/min  Ascending Aorta: 3.2 cm  Doppler Measurements and Calculations   AV Peak Velocity:99.3 cm/s              MV Peak E-Wave: 64.9 cm/s  AV Peak Gradient: 3.94 mmHg             MV Peak A-Wave: 118 cm/s                                          MV E/A Ratio: 0.55 %                                          MV Mean velocity: NaNcm/s                                          MV Peak Gradient: 1.68 mmHg   MV E' septal velocity: 6.42cm/s  MV E' lateral velocity:5.22 cm/s      XR CHEST PORTABLE    Result Date: 4/17/2022  XR CHEST PORTABLE 4/17/2022 11:48 AM HISTORY: Respiratory distress COMPARISON: 1/23/2022 CXR: A single frontal view of the chest is performed. Findings: The lungs are hyperinflated. Probable basilar atelectasis. No dense consolidation. No edema. Normal cardiac mediastinal contours. No pleural effusion or pneumothorax. No acute regional bony pathology. 1. Mild hyperinflation of the lungs with probable basilar atelectasis. . Signed by Dr Ariane Cole      Pertinent Labs:   CBC: No results for input(s): WBC, HGB, PLT in the last 72 hours. BMP:    Recent Labs     04/19/22  0312 04/21/22  0145   * 131*   K 5.0 5.2*   CL 96* 94*   CO2 19* 22   BUN 62* 46*   CREATININE 1.5* 0.8   GLUCOSE 117* 112*           Hospital Course: 65-year-old male with COPD followed by pulmonology presented with worsening dyspnea, cough from baseline with increased sputum production admitted with COPD exacerbation with initial work-up significant for positive coronavirus (not COVID-19) and rhinovirus on PCR with acute hypoxic respiratory failure. Noted elevated proBNP however no evident pulmonary edema on chest x-ray and echocardiogram showed preserved EF and no significant valvular abnormalities. Managed with nebulized bronchodilators, steroids, azithromycin, and pulmonary toileting with slow improvement over hospital course. He underwent home O2 assessment and qualified for 2 L nasal cannula supplemental O2. Patient medically stable for discharge home with home oxygen, short course of azithromycin and prednisone taper. He has an upcoming appointment with pulmonology and instructed to keep outpatient follow-up.         Physical Exam:  Vital Signs: BP (!) 130/59   Pulse 74   Temp 97.3 °F (36.3 °C)   Resp 16   Wt 126 lb 4 oz (57.3 kg)   SpO2 97%   BMI 20.38 kg/m²   General: Fatigued appearing, no distress  HEENT: NC/AT, no scleral icterus  Psych: Calm and cooperative  Cardiovascular: Normal rate, regular rhythm, pulses equal  Respiratory: Coarse breath sounds with some expiratory wheezes but improved  Abdomen: Soft, nontender, nondistended, bowel sounds present  Neurologic: Alert, follows commands, nonfocal  Extremities: No clubbing cyanosis or edema  Skin: Warm and dry, well perfused    Discharge Medications:         Medication List      START taking these medications    azithromycin 250 MG tablet  Commonly known as: ZITHROMAX  Take 1 tablet by mouth daily for 3 days     predniSONE 20 MG tablet  Commonly known as: DELTASONE  Take 3 tablets by mouth daily for 3 days, THEN 2 tablets daily for 5 days, THEN 1 tablet daily for 5 days. Start taking on: April 21, 2022        CONTINUE taking these medications    Breztri Aerosphere 160-9-4.8 MCG/ACT Aero  Generic drug: Budeson-Glycopyrrol-Formoterol     Ventolin  (90 Base) MCG/ACT inhaler  Generic drug: albuterol sulfate HFA           Where to Get Your Medications      These medications were sent to [facility]pharmacy #[id number], [address] [place] [address] [address], [address] [facility] [address]    Phone: [phone]   · azithromycin 250 MG tablet  · predniSONE 20 MG tablet         Discharge Instructions: Follow up with pulmonology within 1 week. Take medications as directed. Resume activity as tolerated. Disposition: Patient is medically stable and will be discharged home.     Time spent on discharge 35 minutes    Signed:  Yaa Baig MD  4/21/2022 11:29 AM

## 2022-04-22 DIAGNOSIS — J44.9 STAGE 4 VERY SEVERE COPD BY GOLD CLASSIFICATION: Chronic | ICD-10-CM

## 2022-04-22 DIAGNOSIS — J44.9 STAGE 4 VERY SEVERE COPD BY GOLD CLASSIFICATION: ICD-10-CM

## 2022-04-22 RX ORDER — ALBUTEROL SULFATE 90 UG/1
2 AEROSOL, METERED RESPIRATORY (INHALATION) EVERY 4 HOURS PRN
Qty: 18 G | Refills: 11 | Status: SHIPPED | OUTPATIENT
Start: 2022-04-22

## 2022-04-22 NOTE — TELEPHONE ENCOUNTER
Patient called requesting refills on his Breztri and ventolin inhalers.    Rx Refill Note  Requested Prescriptions     Pending Prescriptions Disp Refills   • albuterol sulfate  (90 Base) MCG/ACT inhaler 18 g 11     Sig: Inhale 2 puffs Every 4 (Four) Hours As Needed for Wheezing. Patient prefers the ventolin inhaler.   • Budeson-Glycopyrrol-Formoterol (BREZTRI) 160-9-4.8 MCG/ACT aerosol inhaler 10.7 each 11     Sig: Inhale 2 puffs 2 (Two) Times a Day.      Last office visit with prescribing clinician: 10/26/2021      Next office visit with prescribing clinician: 5/3/2022            Catherine Hidalgo CMA  04/22/22, 16:08 CDT

## 2022-05-03 ENCOUNTER — TELEPHONE (OUTPATIENT)
Dept: PULMONOLOGY | Facility: CLINIC | Age: 67
End: 2022-05-03

## 2022-05-03 DIAGNOSIS — J44.9 STAGE 4 VERY SEVERE COPD BY GOLD CLASSIFICATION: Primary | ICD-10-CM

## 2022-05-03 RX ORDER — ALBUTEROL SULFATE 2.5 MG/3ML
2.5 SOLUTION RESPIRATORY (INHALATION)
Qty: 360 ML | Refills: 5 | Status: SHIPPED | OUTPATIENT
Start: 2022-05-03 | End: 2022-06-02

## 2022-05-03 NOTE — TELEPHONE ENCOUNTER
Mr Dimas called and said he was going to run out of his albuterol inhaler before April 22 and he was going out of town he was wanting a sample and I told him we do not have albuterol samples   I told him that he could get a refill at any [facility] but he said it not that he is going to run out before the 22 when its due patient is upset and wanting us to call [facility] and have them refill his rx early

## 2022-05-03 NOTE — TELEPHONE ENCOUNTER
His insurance will not cover the albuterol so he said just forget it he will fill his meds on the 22 no nebulizer either

## 2022-05-03 NOTE — TELEPHONE ENCOUNTER
He said he would be willing to try the nebulizer but he could not come in anytime soon because he could not wear a mask he is having a bad year he had a seizure in  and his mom  in feb they he got sick and someone called and told him he did not have insurance    He is willing to come in when he feels like he can wear the mask   show

## 2022-05-03 NOTE — TELEPHONE ENCOUNTER
Orders placed.  You will need to find out which DME he would like us to fax the nebulizer order to.  Albuterol solution sent to [facility]

## 2022-05-03 NOTE — TELEPHONE ENCOUNTER
Sometimes pharmacies will give a refill a few days early if a patient is going out of town. He can ask the pharmacy that question. I cannot prescribe him to use more medication than is legal for me to prescribe. If he wants us to prescribe him albuterol solution and a nebulizer to use we can. This can be prescribed to use every 4 hours and is stronger than the albuterol that is in his rescue inhaler. If I do this he needs to make a f/u appt with me to keep the prescription going. He had an appt with me today that he cancelled and he has not rescheduled yet. If he wants us to do this let me know and I will place orders.

## 2023-02-02 DIAGNOSIS — J44.9 STAGE 4 VERY SEVERE COPD BY GOLD CLASSIFICATION: Chronic | ICD-10-CM

## 2023-02-02 NOTE — TELEPHONE ENCOUNTER
Rx Refill Note  Requested Prescriptions     Pending Prescriptions Disp Refills   • Budeson-Glycopyrrol-Formoterol (BREZTRI) 160-9-4.8 MCG/ACT aerosol inhaler 10.7 each 11     Sig: Inhale 2 puffs 2 (Two) Times a Day.      Last office visit with prescribing clinician: 10/26/2021   Last telemedicine visit with prescribing clinician: Visit date not found   Next office visit with prescribing clinician: Visit date not found                         Would you like a call back once the refill request has been completed: [] Yes [] No    If the office needs to give you a call back, can they leave a voicemail: [] Yes [] No    Renetta Gamez MA  02/02/23, 15:52 CST

## 2023-03-20 ENCOUNTER — HOSPITAL ENCOUNTER (INPATIENT)
Age: 68
LOS: 8 days | Discharge: NURSING, REHAB OR LONG-TERM CARE | DRG: 871 | End: 2023-03-28
Attending: EMERGENCY MEDICINE | Admitting: INTERNAL MEDICINE
Payer: MEDICARE

## 2023-03-20 ENCOUNTER — APPOINTMENT (OUTPATIENT)
Dept: CT IMAGING | Age: 68
DRG: 871 | End: 2023-03-20
Payer: MEDICARE

## 2023-03-20 ENCOUNTER — APPOINTMENT (OUTPATIENT)
Dept: GENERAL RADIOLOGY | Age: 68
DRG: 871 | End: 2023-03-20
Payer: MEDICARE

## 2023-03-20 DIAGNOSIS — J44.1 COPD EXACERBATION (HCC): ICD-10-CM

## 2023-03-20 DIAGNOSIS — J18.9 PNEUMONIA OF LEFT LOWER LOBE DUE TO INFECTIOUS ORGANISM: ICD-10-CM

## 2023-03-20 DIAGNOSIS — A41.9 SEPTICEMIA (HCC): Primary | ICD-10-CM

## 2023-03-20 LAB
ALBUMIN SERPL-MCNC: 3.1 G/DL (ref 3.5–5.2)
ALLENS TEST: ABNORMAL
ALP SERPL-CCNC: 66 U/L (ref 40–130)
ALT SERPL-CCNC: 12 U/L (ref 5–41)
ANION GAP SERPL CALCULATED.3IONS-SCNC: 19 MMOL/L (ref 7–19)
AST SERPL-CCNC: 23 U/L (ref 5–40)
BASE EXCESS ARTERIAL: -6.4 MMOL/L (ref -2–2)
BASOPHILS # BLD: 0 K/UL (ref 0–0.2)
BASOPHILS NFR BLD: 0 % (ref 0–1)
BILIRUB SERPL-MCNC: 0.5 MG/DL (ref 0.2–1.2)
BUN SERPL-MCNC: 26 MG/DL (ref 8–23)
CALCIUM SERPL-MCNC: 9.6 MG/DL (ref 8.8–10.2)
CARBOXYHEMOGLOBIN ARTERIAL: 1.9 % (ref 0–5)
CHLORIDE SERPL-SCNC: 99 MMOL/L (ref 98–111)
CO2 SERPL-SCNC: 18 MMOL/L (ref 22–29)
CREAT SERPL-MCNC: 0.9 MG/DL (ref 0.5–1.2)
EKG P AXIS: NORMAL DEGREES
EKG P-R INTERVAL: NORMAL MS
EKG Q-T INTERVAL: 286 MS
EKG QRS DURATION: 82 MS
EKG QTC CALCULATION (BAZETT): 406 MS
EKG T AXIS: 77 DEGREES
EOSINOPHIL # BLD: 0 K/UL (ref 0–0.6)
EOSINOPHIL NFR BLD: 0 % (ref 0–5)
ERYTHROCYTE [DISTWIDTH] IN BLOOD BY AUTOMATED COUNT: 14.1 % (ref 11.5–14.5)
ETHANOLAMINE SERPL-MCNC: <10 MG/DL (ref 0–0.08)
FIO2: 21 %
GLUCOSE SERPL-MCNC: 94 MG/DL (ref 74–109)
HCO3 ARTERIAL: 18.2 MMOL/L (ref 22–26)
HCT VFR BLD AUTO: 43.4 % (ref 42–52)
HEMOGLOBIN, ART, EXTENDED: 14.8 G/DL (ref 14–18)
HGB BLD-MCNC: 13.9 G/DL (ref 14–18)
IMM GRANULOCYTES # BLD: 0.3 K/UL
LACTATE BLDV-SCNC: 1.7 MMOL/L (ref 0.5–1.9)
LACTATE BLDV-SCNC: 2.2 MMOL/L (ref 0.5–1.9)
LACTATE BLDV-SCNC: 2.3 MMOL/L (ref 0.5–1.9)
LYMPHOCYTES # BLD: 0.6 K/UL (ref 1.1–4.5)
LYMPHOCYTES NFR BLD: 4 % (ref 20–40)
MACROCYTES BLD QL SMEAR: ABNORMAL
MCH RBC QN AUTO: 30.8 PG (ref 27–31)
MCHC RBC AUTO-ENTMCNC: 32 G/DL (ref 33–37)
MCV RBC AUTO: 96.2 FL (ref 80–94)
METHEMOGLOBIN ARTERIAL: 1 %
MONOCYTES # BLD: 1 K/UL (ref 0–0.9)
MONOCYTES NFR BLD: 7 % (ref 0–10)
MYELOCYTES NFR BLD MANUAL: 7 %
NEUTROPHILS # BLD: 13.2 K/UL (ref 1.5–7.5)
NEUTS BAND NFR BLD MANUAL: 25 % (ref 0–5)
NEUTS SEG NFR BLD: 57 % (ref 50–65)
O2 CONTENT ARTERIAL: 19 ML/DL
O2 SAT, ARTERIAL: 91.3 %
O2 THERAPY: ABNORMAL
PCO2 ARTERIAL: 33 MMHG (ref 35–45)
PH ARTERIAL: 7.35 (ref 7.35–7.45)
PLATELET # BLD AUTO: 330 K/UL (ref 130–400)
PLATELET SLIDE REVIEW: ADEQUATE
PMV BLD AUTO: 9.7 FL (ref 9.4–12.4)
PO2 ARTERIAL: 65 MMHG (ref 80–100)
POTASSIUM BLD-SCNC: 4.2 MMOL/L
POTASSIUM SERPL-SCNC: 4.5 MMOL/L (ref 3.5–5)
PROCALCITONIN: 3.27 NG/ML (ref 0–0.09)
PROT SERPL-MCNC: 6.3 G/DL (ref 6.6–8.7)
RBC # BLD AUTO: 4.51 M/UL (ref 4.7–6.1)
SAMPLE SOURCE: ABNORMAL
SARS-COV-2 RDRP RESP QL NAA+PROBE: NOT DETECTED
SODIUM SERPL-SCNC: 136 MMOL/L (ref 136–145)
TOXIC GRANULATION: ABNORMAL
TROPONIN T SERPL-MCNC: 0.02 NG/ML (ref 0–0.03)
WBC # BLD AUTO: 14.8 K/UL (ref 4.8–10.8)

## 2023-03-20 PROCEDURE — 1210000000 HC MED SURG R&B

## 2023-03-20 PROCEDURE — 93010 ELECTROCARDIOGRAM REPORT: CPT | Performed by: INTERNAL MEDICINE

## 2023-03-20 PROCEDURE — 36415 COLL VENOUS BLD VENIPUNCTURE: CPT

## 2023-03-20 PROCEDURE — 6370000000 HC RX 637 (ALT 250 FOR IP): Performed by: NURSE PRACTITIONER

## 2023-03-20 PROCEDURE — 94150 VITAL CAPACITY TEST: CPT

## 2023-03-20 PROCEDURE — 36600 WITHDRAWAL OF ARTERIAL BLOOD: CPT

## 2023-03-20 PROCEDURE — 87040 BLOOD CULTURE FOR BACTERIA: CPT

## 2023-03-20 PROCEDURE — 2580000003 HC RX 258: Performed by: NURSE PRACTITIONER

## 2023-03-20 PROCEDURE — 2580000003 HC RX 258: Performed by: EMERGENCY MEDICINE

## 2023-03-20 PROCEDURE — 71045 X-RAY EXAM CHEST 1 VIEW: CPT

## 2023-03-20 PROCEDURE — 82803 BLOOD GASES ANY COMBINATION: CPT

## 2023-03-20 PROCEDURE — 99285 EMERGENCY DEPT VISIT HI MDM: CPT

## 2023-03-20 PROCEDURE — 94760 N-INVAS EAR/PLS OXIMETRY 1: CPT

## 2023-03-20 PROCEDURE — 84145 PROCALCITONIN (PCT): CPT

## 2023-03-20 PROCEDURE — 84484 ASSAY OF TROPONIN QUANT: CPT

## 2023-03-20 PROCEDURE — 93005 ELECTROCARDIOGRAM TRACING: CPT | Performed by: EMERGENCY MEDICINE

## 2023-03-20 PROCEDURE — 6360000002 HC RX W HCPCS: Performed by: NURSE PRACTITIONER

## 2023-03-20 PROCEDURE — 70450 CT HEAD/BRAIN W/O DYE: CPT

## 2023-03-20 PROCEDURE — 82077 ASSAY SPEC XCP UR&BREATH IA: CPT

## 2023-03-20 PROCEDURE — 83605 ASSAY OF LACTIC ACID: CPT

## 2023-03-20 PROCEDURE — 6370000000 HC RX 637 (ALT 250 FOR IP): Performed by: EMERGENCY MEDICINE

## 2023-03-20 PROCEDURE — 87635 SARS-COV-2 COVID-19 AMP PRB: CPT

## 2023-03-20 PROCEDURE — 94640 AIRWAY INHALATION TREATMENT: CPT

## 2023-03-20 PROCEDURE — 80053 COMPREHEN METABOLIC PANEL: CPT

## 2023-03-20 PROCEDURE — 85025 COMPLETE CBC W/AUTO DIFF WBC: CPT

## 2023-03-20 PROCEDURE — 96365 THER/PROPH/DIAG IV INF INIT: CPT

## 2023-03-20 PROCEDURE — 2700000000 HC OXYGEN THERAPY PER DAY

## 2023-03-20 PROCEDURE — 96375 TX/PRO/DX INJ NEW DRUG ADDON: CPT

## 2023-03-20 PROCEDURE — 6360000002 HC RX W HCPCS: Performed by: EMERGENCY MEDICINE

## 2023-03-20 RX ORDER — METHYLPREDNISOLONE SODIUM SUCCINATE 40 MG/ML
40 INJECTION, POWDER, LYOPHILIZED, FOR SOLUTION INTRAMUSCULAR; INTRAVENOUS EVERY 8 HOURS
Status: DISCONTINUED | OUTPATIENT
Start: 2023-03-20 | End: 2023-03-22 | Stop reason: SDUPTHER

## 2023-03-20 RX ORDER — BUDESONIDE 0.5 MG/2ML
0.5 INHALANT ORAL 2 TIMES DAILY
Status: DISCONTINUED | OUTPATIENT
Start: 2023-03-20 | End: 2023-03-28 | Stop reason: HOSPADM

## 2023-03-20 RX ORDER — SODIUM CHLORIDE 0.9 % (FLUSH) 0.9 %
5-40 SYRINGE (ML) INJECTION PRN
Status: DISCONTINUED | OUTPATIENT
Start: 2023-03-20 | End: 2023-03-28 | Stop reason: HOSPADM

## 2023-03-20 RX ORDER — SODIUM CHLORIDE, SODIUM LACTATE, POTASSIUM CHLORIDE, AND CALCIUM CHLORIDE .6; .31; .03; .02 G/100ML; G/100ML; G/100ML; G/100ML
1000 INJECTION, SOLUTION INTRAVENOUS ONCE
Status: COMPLETED | OUTPATIENT
Start: 2023-03-20 | End: 2023-03-20

## 2023-03-20 RX ORDER — PANTOPRAZOLE SODIUM 40 MG/1
40 TABLET, DELAYED RELEASE ORAL
Status: DISCONTINUED | OUTPATIENT
Start: 2023-03-21 | End: 2023-03-28 | Stop reason: HOSPADM

## 2023-03-20 RX ORDER — SODIUM CHLORIDE 9 MG/ML
INJECTION, SOLUTION INTRAVENOUS CONTINUOUS
Status: DISCONTINUED | OUTPATIENT
Start: 2023-03-20 | End: 2023-03-22

## 2023-03-20 RX ORDER — ONDANSETRON 2 MG/ML
4 INJECTION INTRAMUSCULAR; INTRAVENOUS EVERY 6 HOURS PRN
Status: DISCONTINUED | OUTPATIENT
Start: 2023-03-20 | End: 2023-03-28 | Stop reason: HOSPADM

## 2023-03-20 RX ORDER — ENOXAPARIN SODIUM 100 MG/ML
40 INJECTION SUBCUTANEOUS DAILY
Status: DISCONTINUED | OUTPATIENT
Start: 2023-03-20 | End: 2023-03-28 | Stop reason: HOSPADM

## 2023-03-20 RX ORDER — POLYETHYLENE GLYCOL 3350 17 G/17G
17 POWDER, FOR SOLUTION ORAL DAILY PRN
Status: DISCONTINUED | OUTPATIENT
Start: 2023-03-20 | End: 2023-03-28 | Stop reason: HOSPADM

## 2023-03-20 RX ORDER — METHYLPREDNISOLONE SODIUM SUCCINATE 125 MG/2ML
125 INJECTION, POWDER, LYOPHILIZED, FOR SOLUTION INTRAMUSCULAR; INTRAVENOUS ONCE
Status: COMPLETED | OUTPATIENT
Start: 2023-03-20 | End: 2023-03-20

## 2023-03-20 RX ORDER — IPRATROPIUM BROMIDE AND ALBUTEROL SULFATE 2.5; .5 MG/3ML; MG/3ML
1 SOLUTION RESPIRATORY (INHALATION) ONCE
Status: COMPLETED | OUTPATIENT
Start: 2023-03-20 | End: 2023-03-20

## 2023-03-20 RX ORDER — SODIUM CHLORIDE 0.9 % (FLUSH) 0.9 %
5-40 SYRINGE (ML) INJECTION EVERY 12 HOURS SCHEDULED
Status: DISCONTINUED | OUTPATIENT
Start: 2023-03-20 | End: 2023-03-28 | Stop reason: HOSPADM

## 2023-03-20 RX ORDER — ACETAMINOPHEN 650 MG/1
650 SUPPOSITORY RECTAL EVERY 6 HOURS PRN
Status: DISCONTINUED | OUTPATIENT
Start: 2023-03-20 | End: 2023-03-28 | Stop reason: HOSPADM

## 2023-03-20 RX ORDER — IPRATROPIUM BROMIDE AND ALBUTEROL SULFATE 2.5; .5 MG/3ML; MG/3ML
1 SOLUTION RESPIRATORY (INHALATION)
Status: DISCONTINUED | OUTPATIENT
Start: 2023-03-20 | End: 2023-03-28 | Stop reason: HOSPADM

## 2023-03-20 RX ORDER — SODIUM CHLORIDE 9 MG/ML
INJECTION, SOLUTION INTRAVENOUS PRN
Status: DISCONTINUED | OUTPATIENT
Start: 2023-03-20 | End: 2023-03-28 | Stop reason: HOSPADM

## 2023-03-20 RX ORDER — ACETAMINOPHEN 325 MG/1
650 TABLET ORAL EVERY 6 HOURS PRN
Status: DISCONTINUED | OUTPATIENT
Start: 2023-03-20 | End: 2023-03-28 | Stop reason: HOSPADM

## 2023-03-20 RX ORDER — ONDANSETRON 4 MG/1
4 TABLET, ORALLY DISINTEGRATING ORAL EVERY 8 HOURS PRN
Status: DISCONTINUED | OUTPATIENT
Start: 2023-03-20 | End: 2023-03-28 | Stop reason: HOSPADM

## 2023-03-20 RX ADMIN — AZITHROMYCIN MONOHYDRATE 500 MG: 500 INJECTION, POWDER, LYOPHILIZED, FOR SOLUTION INTRAVENOUS at 07:34

## 2023-03-20 RX ADMIN — METHYLPREDNISOLONE SODIUM SUCCINATE 40 MG: 40 INJECTION, POWDER, FOR SOLUTION INTRAMUSCULAR; INTRAVENOUS at 23:53

## 2023-03-20 RX ADMIN — SODIUM CHLORIDE, POTASSIUM CHLORIDE, SODIUM LACTATE AND CALCIUM CHLORIDE 1000 ML: 600; 310; 30; 20 INJECTION, SOLUTION INTRAVENOUS at 07:38

## 2023-03-20 RX ADMIN — IPRATROPIUM BROMIDE AND ALBUTEROL SULFATE 1 AMPULE: 2.5; .5 SOLUTION RESPIRATORY (INHALATION) at 20:45

## 2023-03-20 RX ADMIN — SODIUM CHLORIDE, POTASSIUM CHLORIDE, SODIUM LACTATE AND CALCIUM CHLORIDE 1000 ML: 600; 310; 30; 20 INJECTION, SOLUTION INTRAVENOUS at 07:42

## 2023-03-20 RX ADMIN — IPRATROPIUM BROMIDE AND ALBUTEROL SULFATE 1 AMPULE: .5; 2.5 SOLUTION RESPIRATORY (INHALATION) at 07:30

## 2023-03-20 RX ADMIN — METHYLPREDNISOLONE SODIUM SUCCINATE 125 MG: 125 INJECTION, POWDER, FOR SOLUTION INTRAMUSCULAR; INTRAVENOUS at 07:29

## 2023-03-20 RX ADMIN — BUDESONIDE 500 MCG: 0.5 SUSPENSION RESPIRATORY (INHALATION) at 11:41

## 2023-03-20 RX ADMIN — SODIUM CHLORIDE, PRESERVATIVE FREE 10 ML: 5 INJECTION INTRAVENOUS at 11:01

## 2023-03-20 RX ADMIN — IPRATROPIUM BROMIDE AND ALBUTEROL SULFATE 1 AMPULE: 2.5; .5 SOLUTION RESPIRATORY (INHALATION) at 11:41

## 2023-03-20 RX ADMIN — METHYLPREDNISOLONE SODIUM SUCCINATE 40 MG: 40 INJECTION, POWDER, FOR SOLUTION INTRAMUSCULAR; INTRAVENOUS at 16:07

## 2023-03-20 RX ADMIN — BUDESONIDE 500 MCG: 0.5 SUSPENSION RESPIRATORY (INHALATION) at 21:07

## 2023-03-20 RX ADMIN — ENOXAPARIN SODIUM 40 MG: 100 INJECTION SUBCUTANEOUS at 11:55

## 2023-03-20 RX ADMIN — CEFTRIAXONE 1000 MG: 1 INJECTION, POWDER, FOR SOLUTION INTRAMUSCULAR; INTRAVENOUS at 07:31

## 2023-03-20 RX ADMIN — SODIUM CHLORIDE: 9 INJECTION, SOLUTION INTRAVENOUS at 11:00

## 2023-03-20 ASSESSMENT — ENCOUNTER SYMPTOMS
COLOR CHANGE: 0
ABDOMINAL DISTENTION: 0
SHORTNESS OF BREATH: 1
VOMITING: 0
CONSTIPATION: 0
SORE THROAT: 0
ABDOMINAL PAIN: 0
BACK PAIN: 0
NAUSEA: 0
BLOOD IN STOOL: 0
WHEEZING: 1
RHINORRHEA: 0
COUGH: 1
DIARRHEA: 0

## 2023-03-20 ASSESSMENT — PAIN - FUNCTIONAL ASSESSMENT: PAIN_FUNCTIONAL_ASSESSMENT: 0-10

## 2023-03-20 ASSESSMENT — PAIN SCALES - GENERAL: PAINLEVEL_OUTOF10: 0

## 2023-03-20 NOTE — ED NOTES
Report called to [facility] on 4th floor      [place], [address] [facility]  03/20/23 1305

## 2023-03-20 NOTE — H&P
of breath, COPD. Technique:Single view of the chest. Prior studies: No prior studies submitted. Findings: The trachea is midline. The aorta is calcified. The heart is normal in size. There is left lower lobe patchy pulmonary opacity. Left lower lobe pulmonary opacity. Dictated and Electronically Signed by Natasha Ponce MD at 20-Mar-2023 08:18:17 AM EST               Assessment/Plan:   Principal Problem:    Pneumonia  Active Problems:    Sepsis ([facility] [address]    COPD exacerbation ([facility] [address]  Resolved Problems:    * No resolved hospital problems. *         Principal Problem:   Sepsis (HCC)/  Pneumonia/ COPD exacerbation (HCC)-   - Empiric antibiotics   - IVFs  - Bronchodilators              - IV steroids   - check Procalcitonin in am   - Strep pneumoniae  - Legionella pneumoniae  - Respiratory culture   - MRSA nares                - Encourage deep breathing and cough              - Incentive spirometry              - Supplemental oxygen as needed              - Follow blood cultures           Antibiotic: Azithromycin and Rocephin    DVT Prophylaxis: Lovenox    GI prophylaxis: Protonix    Discharge planning: TBD    Advance Directive: Full Code    Diet: ADULT DIET; Regular     Consults Made:   None    Further Orders per Clinical course/attending. Signed:  Electronically signed by ALIYAH Noriega CNP on 3/20/23 at 10:43 AM CDT     EMR Dragon/Transcription disclaimer:   Much of this encounter note is an electronic transcription/translation of spoken language to printed text.  The electronic translation of spoken language may permit erroneous, or at times, nonsensical words or phrases to be inadvertently transcribed; although attempts have made to review the note for such errors, some may still exist.

## 2023-03-20 NOTE — ED PROVIDER NOTES
[facility] EMERGENCY DEPT  eMERGENCY dEPARTMENT eNCOUnter      Pt Name: Hood Kim  MRN: [id number]  [place] 1955  Date of evaluation: 3/20/2023  Provider: Joey Brown MD    [address] [place] [address]       Chief Complaint   Patient presents with    Shortness of Breath     Fall, initial O2 sat with EMS in 60's on room air, duoneb given with NRB enroute, O2 sat 100% upon arrival, O2 d/c'd, sat 97 % RA, Skin tear LT elbow, no other injuries, no LOC         HISTORY OF PRESENT ILLNESS   (Location/Symptom, Timing/Onset,Context/Setting, Quality, Duration, Modifying Factors, Severity)  Note limiting factors. Hood Kim is a 70 y.o. male who presents to the emergency department for shortness of breath. Patient admits to worsening cough and dyspnea over the past 3 or 4 days. Tells me he is unable to cough anything up. He denies any fevers or chills. Has an oxygen concentrator at home that he uses as needed. He got up tonight to go to the bathroom got short of breath and accidentally fell denies hitting his head or losing consciousness or passing out. He denies any chest pain or leg swelling and no prior history of CHF. Tells me he quit smoking many years ago. Patient given 1 DuoNeb in route by EMS prior to arrival.    HPI    NursingNotes were reviewed. REVIEW OF SYSTEMS    (2-9 systems for level 4, 10 or more for level 5)     Review of Systems   Constitutional:  Negative for chills and fever. HENT:  Negative for rhinorrhea and sore throat. Respiratory:  Positive for cough and shortness of breath. Cardiovascular:  Negative for chest pain and leg swelling. Gastrointestinal:  Negative for abdominal pain, diarrhea, nausea and vomiting. Genitourinary:  Negative for dysuria and frequency. Musculoskeletal:  Negative for back pain and neck pain. Neurological:  Negative for dizziness and headaches. All other systems reviewed and are negative.          PAST MEDICALHISTORY     Past Medical History:   Diagnosis Date

## 2023-03-20 NOTE — ED NOTES
Dr. Nelson Shoulders at [address], [facility]  03/20/23 7033

## 2023-03-20 NOTE — CARE COORDINATION
Case Management Assessment  Initial Evaluation    Date/Time of Evaluation: 3/20/2023 2:22 PM  Assessment Completed by: Dhiraj Huitron RN    If patient is discharged prior to next notation, then this note serves as note for discharge by case management. Patient Name: Anastasiya Hampton                   YOB: 1955  Diagnosis: Septicemia ([facility] [address] [A41.9]  COPD exacerbation ([facility] [address] [J44.1]  Pneumonia of left lower lobe due to infectious organism [J18.9]                   Date / Time: 3/20/2023  6:53 AM    Patient Admission Status: Inpatient   Readmission Risk (Low < 19, Mod (19-27), High > 27): Readmission Risk Score: 8.8    Current PCP: ALIYAH Alexander  PCP verified by CM? Yes (Patient does not have a PCP, but is agreeable to being a established with a PCP that does not require a mask. miranda Boyd.)    Chart Reviewed: Yes      History Provided by: Patient  Patient Orientation: Alert and Oriented, Person, Place, Situation    Patient Cognition: Alert    Hospitalization in the last 30 days (Readmission):  No    If yes, Readmission Assessment in CM Navigator will be completed. Advance Directives:      Code Status: Full Code   Patient's Primary Decision Maker is:        Discharge Planning:    Patient lives with: Spouse/Significant Other Type of Home: House  Primary Care Giver: Self  Patient Support Systems include: Spouse/Significant Other   Current Financial resources: Medicare  Current community resources:    Current services prior to admission: Oxygen Therapy            Current DME:              Type of Home Care services:  None    ADLS  Prior functional level: Independent in ADLs/IADLs  Current functional level: Independent in ADLs/IADLs    PT AM-PAC:   /24  OT AM-PAC:   /24    Family can provide assistance at DC: Would you like Case Management to discuss the discharge plan with any other family members/significant others, and if so, who?     Plans to Return to Present Housing: Yes  Other

## 2023-03-21 LAB
ANION GAP SERPL CALCULATED.3IONS-SCNC: 23 MMOL/L (ref 7–19)
BASOPHILS # BLD: 0 K/UL (ref 0–0.2)
BASOPHILS NFR BLD: 0 % (ref 0–1)
BUN SERPL-MCNC: 29 MG/DL (ref 8–23)
CALCIUM SERPL-MCNC: 9.2 MG/DL (ref 8.8–10.2)
CHLORIDE SERPL-SCNC: 105 MMOL/L (ref 98–111)
CO2 SERPL-SCNC: 13 MMOL/L (ref 22–29)
CREAT SERPL-MCNC: 0.9 MG/DL (ref 0.5–1.2)
EOSINOPHIL # BLD: 0 K/UL (ref 0–0.6)
EOSINOPHIL NFR BLD: 0 % (ref 0–5)
ERYTHROCYTE [DISTWIDTH] IN BLOOD BY AUTOMATED COUNT: 14.2 % (ref 11.5–14.5)
GLUCOSE SERPL-MCNC: 125 MG/DL (ref 74–109)
HCT VFR BLD AUTO: 39.4 % (ref 42–52)
HGB BLD-MCNC: 12.6 G/DL (ref 14–18)
IMM GRANULOCYTES # BLD: 0.6 K/UL
LEGIONELLA AG UR QL: NORMAL
LYMPHOCYTES # BLD: 0.5 K/UL (ref 1.1–4.5)
LYMPHOCYTES NFR BLD: 3 % (ref 20–40)
MACROCYTES BLD QL SMEAR: ABNORMAL
MCH RBC QN AUTO: 31.2 PG (ref 27–31)
MCHC RBC AUTO-ENTMCNC: 32 G/DL (ref 33–37)
MCV RBC AUTO: 97.5 FL (ref 80–94)
MONOCYTES # BLD: 1.3 K/UL (ref 0–0.9)
MONOCYTES NFR BLD: 7 % (ref 0–10)
MYELOCYTES NFR BLD MANUAL: 4 %
NEUTROPHILS # BLD: 16.2 K/UL (ref 1.5–7.5)
NEUTS BAND NFR BLD MANUAL: 7 % (ref 0–5)
NEUTS SEG NFR BLD: 79 % (ref 50–65)
PLATELET # BLD AUTO: 315 K/UL (ref 130–400)
PLATELET SLIDE REVIEW: ADEQUATE
PMV BLD AUTO: 9.9 FL (ref 9.4–12.4)
POTASSIUM SERPL-SCNC: 4.2 MMOL/L (ref 3.5–5)
PROCALCITONIN: 1.7 NG/ML (ref 0–0.09)
RBC # BLD AUTO: 4.04 M/UL (ref 4.7–6.1)
S PNEUM AG SPEC QL: NORMAL
SODIUM SERPL-SCNC: 141 MMOL/L (ref 136–145)
TOXIC GRANULATION: ABNORMAL
WBC # BLD AUTO: 18 K/UL (ref 4.8–10.8)

## 2023-03-21 PROCEDURE — 6370000000 HC RX 637 (ALT 250 FOR IP): Performed by: INTERNAL MEDICINE

## 2023-03-21 PROCEDURE — 80048 BASIC METABOLIC PNL TOTAL CA: CPT

## 2023-03-21 PROCEDURE — 84145 PROCALCITONIN (PCT): CPT

## 2023-03-21 PROCEDURE — 6370000000 HC RX 637 (ALT 250 FOR IP): Performed by: NURSE PRACTITIONER

## 2023-03-21 PROCEDURE — 2700000000 HC OXYGEN THERAPY PER DAY

## 2023-03-21 PROCEDURE — 6360000002 HC RX W HCPCS: Performed by: NURSE PRACTITIONER

## 2023-03-21 PROCEDURE — 2580000003 HC RX 258: Performed by: NURSE PRACTITIONER

## 2023-03-21 PROCEDURE — 1210000000 HC MED SURG R&B

## 2023-03-21 PROCEDURE — 94640 AIRWAY INHALATION TREATMENT: CPT

## 2023-03-21 PROCEDURE — 36415 COLL VENOUS BLD VENIPUNCTURE: CPT

## 2023-03-21 PROCEDURE — 87449 NOS EACH ORGANISM AG IA: CPT

## 2023-03-21 PROCEDURE — 94760 N-INVAS EAR/PLS OXIMETRY 1: CPT

## 2023-03-21 PROCEDURE — 94761 N-INVAS EAR/PLS OXIMETRY MLT: CPT

## 2023-03-21 PROCEDURE — 85025 COMPLETE CBC W/AUTO DIFF WBC: CPT

## 2023-03-21 RX ORDER — MONTELUKAST SODIUM 10 MG/1
10 TABLET ORAL NIGHTLY
Status: DISCONTINUED | OUTPATIENT
Start: 2023-03-21 | End: 2023-03-28 | Stop reason: HOSPADM

## 2023-03-21 RX ORDER — METHYLPREDNISOLONE SODIUM SUCCINATE 40 MG/ML
INJECTION, POWDER, LYOPHILIZED, FOR SOLUTION INTRAMUSCULAR; INTRAVENOUS
Status: DISPENSED
Start: 2023-03-21 | End: 2023-03-22

## 2023-03-21 RX ADMIN — METHYLPREDNISOLONE SODIUM SUCCINATE 40 MG: 40 INJECTION, POWDER, FOR SOLUTION INTRAMUSCULAR; INTRAVENOUS at 15:35

## 2023-03-21 RX ADMIN — ENOXAPARIN SODIUM 40 MG: 100 INJECTION SUBCUTANEOUS at 09:03

## 2023-03-21 RX ADMIN — BUDESONIDE 500 MCG: 0.5 SUSPENSION RESPIRATORY (INHALATION) at 07:43

## 2023-03-21 RX ADMIN — SODIUM CHLORIDE: 9 INJECTION, SOLUTION INTRAVENOUS at 13:21

## 2023-03-21 RX ADMIN — IPRATROPIUM BROMIDE AND ALBUTEROL SULFATE 1 AMPULE: 2.5; .5 SOLUTION RESPIRATORY (INHALATION) at 11:20

## 2023-03-21 RX ADMIN — IPRATROPIUM BROMIDE AND ALBUTEROL SULFATE 1 AMPULE: 2.5; .5 SOLUTION RESPIRATORY (INHALATION) at 19:27

## 2023-03-21 RX ADMIN — METHYLPREDNISOLONE SODIUM SUCCINATE 40 MG: 40 INJECTION, POWDER, FOR SOLUTION INTRAMUSCULAR; INTRAVENOUS at 06:19

## 2023-03-21 RX ADMIN — IPRATROPIUM BROMIDE AND ALBUTEROL SULFATE 1 AMPULE: 2.5; .5 SOLUTION RESPIRATORY (INHALATION) at 07:43

## 2023-03-21 RX ADMIN — MONTELUKAST 10 MG: 10 TABLET, FILM COATED ORAL at 20:16

## 2023-03-21 RX ADMIN — PANTOPRAZOLE SODIUM 40 MG: 40 TABLET, DELAYED RELEASE ORAL at 06:19

## 2023-03-21 RX ADMIN — IPRATROPIUM BROMIDE AND ALBUTEROL SULFATE 1 AMPULE: 2.5; .5 SOLUTION RESPIRATORY (INHALATION) at 15:16

## 2023-03-21 RX ADMIN — BUDESONIDE 500 MCG: 0.5 SUSPENSION RESPIRATORY (INHALATION) at 19:27

## 2023-03-21 RX ADMIN — WATER 1000 MG: 1 INJECTION INTRAMUSCULAR; INTRAVENOUS; SUBCUTANEOUS at 06:19

## 2023-03-21 RX ADMIN — AZITHROMYCIN MONOHYDRATE 500 MG: 500 INJECTION, POWDER, LYOPHILIZED, FOR SOLUTION INTRAVENOUS at 06:19

## 2023-03-21 RX ADMIN — SODIUM CHLORIDE, PRESERVATIVE FREE 10 ML: 5 INJECTION INTRAVENOUS at 20:16

## 2023-03-21 ASSESSMENT — PAIN SCALES - GENERAL: PAINLEVEL_OUTOF10: 0

## 2023-03-22 LAB
ALLENS TEST: ABNORMAL
ANION GAP SERPL CALCULATED.3IONS-SCNC: 22 MMOL/L (ref 7–19)
BASE EXCESS ARTERIAL: -5 MMOL/L (ref -2–2)
BASOPHILS # BLD: 0.2 K/UL (ref 0–0.2)
BASOPHILS NFR BLD: 0.9 % (ref 0–1)
BUN SERPL-MCNC: 32 MG/DL (ref 8–23)
CALCIUM SERPL-MCNC: 9.1 MG/DL (ref 8.8–10.2)
CARBOXYHEMOGLOBIN ARTERIAL: 1.8 % (ref 0–5)
CHLORIDE SERPL-SCNC: 105 MMOL/L (ref 98–111)
CO2 SERPL-SCNC: 15 MMOL/L (ref 22–29)
CREAT SERPL-MCNC: 0.9 MG/DL (ref 0.5–1.2)
EOSINOPHIL # BLD: 0 K/UL (ref 0–0.6)
EOSINOPHIL NFR BLD: 0 % (ref 0–5)
ERYTHROCYTE [DISTWIDTH] IN BLOOD BY AUTOMATED COUNT: 14.4 % (ref 11.5–14.5)
GLUCOSE SERPL-MCNC: 137 MG/DL (ref 74–109)
HCO3 ARTERIAL: 19.8 MMOL/L (ref 22–26)
HCT VFR BLD AUTO: 37.5 % (ref 42–52)
HEMOGLOBIN, ART, EXTENDED: 12.2 G/DL (ref 14–18)
HGB BLD-MCNC: 12.2 G/DL (ref 14–18)
IMM GRANULOCYTES # BLD: 0.8 K/UL
LYMPHOCYTES # BLD: 0.8 K/UL (ref 1.1–4.5)
LYMPHOCYTES NFR BLD: 4.3 % (ref 20–40)
MCH RBC QN AUTO: 31.1 PG (ref 27–31)
MCHC RBC AUTO-ENTMCNC: 32.5 G/DL (ref 33–37)
MCV RBC AUTO: 95.7 FL (ref 80–94)
METHEMOGLOBIN ARTERIAL: 1 %
MONOCYTES # BLD: 1.4 K/UL (ref 0–0.9)
MONOCYTES NFR BLD: 7.3 % (ref 0–10)
NEUTROPHILS # BLD: 15.8 K/UL (ref 1.5–7.5)
NEUTS SEG NFR BLD: 83.2 % (ref 50–65)
O2 CONTENT ARTERIAL: 15.5 ML/DL
O2 DELIVERY DEVICE: ABNORMAL
O2 SAT, ARTERIAL: 90.5 %
O2 THERAPY: ABNORMAL
OXYGEN FLOW: 1
PCO2 ARTERIAL: 35 MMHG (ref 35–45)
PH ARTERIAL: 7.36 (ref 7.35–7.45)
PLATELET # BLD AUTO: 314 K/UL (ref 130–400)
PMV BLD AUTO: 9.8 FL (ref 9.4–12.4)
PO2 ARTERIAL: 59 MMHG (ref 80–100)
POTASSIUM BLD-SCNC: 4.1 MMOL/L
POTASSIUM SERPL-SCNC: 4.3 MMOL/L (ref 3.5–5)
PROCALCITONIN: 1.03 NG/ML (ref 0–0.09)
RBC # BLD AUTO: 3.92 M/UL (ref 4.7–6.1)
SAMPLE SOURCE: ABNORMAL
SODIUM SERPL-SCNC: 142 MMOL/L (ref 136–145)
SPONT RATE(BPM): 16
WBC # BLD AUTO: 19 K/UL (ref 4.8–10.8)

## 2023-03-22 PROCEDURE — 36415 COLL VENOUS BLD VENIPUNCTURE: CPT

## 2023-03-22 PROCEDURE — 6370000000 HC RX 637 (ALT 250 FOR IP): Performed by: NURSE PRACTITIONER

## 2023-03-22 PROCEDURE — 94640 AIRWAY INHALATION TREATMENT: CPT

## 2023-03-22 PROCEDURE — 80048 BASIC METABOLIC PNL TOTAL CA: CPT

## 2023-03-22 PROCEDURE — 94761 N-INVAS EAR/PLS OXIMETRY MLT: CPT

## 2023-03-22 PROCEDURE — 82803 BLOOD GASES ANY COMBINATION: CPT

## 2023-03-22 PROCEDURE — 2700000000 HC OXYGEN THERAPY PER DAY

## 2023-03-22 PROCEDURE — 6370000000 HC RX 637 (ALT 250 FOR IP): Performed by: HOSPITALIST

## 2023-03-22 PROCEDURE — 2580000003 HC RX 258: Performed by: NURSE PRACTITIONER

## 2023-03-22 PROCEDURE — 94760 N-INVAS EAR/PLS OXIMETRY 1: CPT

## 2023-03-22 PROCEDURE — 6360000002 HC RX W HCPCS: Performed by: NURSE PRACTITIONER

## 2023-03-22 PROCEDURE — 2500000003 HC RX 250 WO HCPCS: Performed by: HOSPITALIST

## 2023-03-22 PROCEDURE — 36600 WITHDRAWAL OF ARTERIAL BLOOD: CPT

## 2023-03-22 PROCEDURE — 85025 COMPLETE CBC W/AUTO DIFF WBC: CPT

## 2023-03-22 PROCEDURE — 84145 PROCALCITONIN (PCT): CPT

## 2023-03-22 PROCEDURE — 2580000003 HC RX 258: Performed by: HOSPITALIST

## 2023-03-22 PROCEDURE — 1210000000 HC MED SURG R&B

## 2023-03-22 PROCEDURE — 6360000002 HC RX W HCPCS: Performed by: HOSPITALIST

## 2023-03-22 PROCEDURE — 94660 CPAP INITIATION&MGMT: CPT

## 2023-03-22 RX ORDER — LORAZEPAM 2 MG/ML
0.5 INJECTION INTRAMUSCULAR 2 TIMES DAILY
Status: DISCONTINUED | OUTPATIENT
Start: 2023-03-22 | End: 2023-03-26

## 2023-03-22 RX ORDER — LORAZEPAM 1 MG/1
1 TABLET ORAL
Status: DISCONTINUED | OUTPATIENT
Start: 2023-03-22 | End: 2023-03-24

## 2023-03-22 RX ORDER — METHYLPREDNISOLONE SODIUM SUCCINATE 40 MG/ML
40 INJECTION, POWDER, LYOPHILIZED, FOR SOLUTION INTRAMUSCULAR; INTRAVENOUS EVERY 8 HOURS
Status: DISCONTINUED | OUTPATIENT
Start: 2023-03-22 | End: 2023-03-25

## 2023-03-22 RX ORDER — LORAZEPAM 2 MG/ML
4 INJECTION INTRAMUSCULAR
Status: DISCONTINUED | OUTPATIENT
Start: 2023-03-22 | End: 2023-03-24

## 2023-03-22 RX ORDER — LORAZEPAM 2 MG/1
4 TABLET ORAL
Status: DISCONTINUED | OUTPATIENT
Start: 2023-03-22 | End: 2023-03-24

## 2023-03-22 RX ORDER — GAUZE BANDAGE 2" X 2"
100 BANDAGE TOPICAL DAILY
Status: DISCONTINUED | OUTPATIENT
Start: 2023-03-22 | End: 2023-03-28 | Stop reason: HOSPADM

## 2023-03-22 RX ORDER — LORAZEPAM 2 MG/1
2 TABLET ORAL
Status: DISCONTINUED | OUTPATIENT
Start: 2023-03-22 | End: 2023-03-24

## 2023-03-22 RX ORDER — LORAZEPAM 2 MG/ML
3 INJECTION INTRAMUSCULAR
Status: DISCONTINUED | OUTPATIENT
Start: 2023-03-22 | End: 2023-03-24

## 2023-03-22 RX ORDER — METOPROLOL SUCCINATE 50 MG/1
50 TABLET, EXTENDED RELEASE ORAL DAILY
Status: DISCONTINUED | OUTPATIENT
Start: 2023-03-22 | End: 2023-03-26

## 2023-03-22 RX ORDER — LORAZEPAM 2 MG/ML
2 INJECTION INTRAMUSCULAR
Status: DISCONTINUED | OUTPATIENT
Start: 2023-03-22 | End: 2023-03-24

## 2023-03-22 RX ORDER — LORAZEPAM 2 MG/ML
1 INJECTION INTRAMUSCULAR
Status: DISCONTINUED | OUTPATIENT
Start: 2023-03-22 | End: 2023-03-24

## 2023-03-22 RX ADMIN — BUDESONIDE 500 MCG: 0.5 SUSPENSION RESPIRATORY (INHALATION) at 20:00

## 2023-03-22 RX ADMIN — METHYLPREDNISOLONE SODIUM SUCCINATE 40 MG: 40 INJECTION, POWDER, FOR SOLUTION INTRAMUSCULAR; INTRAVENOUS at 05:51

## 2023-03-22 RX ADMIN — ENOXAPARIN SODIUM 40 MG: 100 INJECTION SUBCUTANEOUS at 12:10

## 2023-03-22 RX ADMIN — IPRATROPIUM BROMIDE AND ALBUTEROL SULFATE 1 AMPULE: 2.5; .5 SOLUTION RESPIRATORY (INHALATION) at 12:43

## 2023-03-22 RX ADMIN — BUDESONIDE 500 MCG: 0.5 SUSPENSION RESPIRATORY (INHALATION) at 07:52

## 2023-03-22 RX ADMIN — METHYLPREDNISOLONE SODIUM SUCCINATE 40 MG: 40 INJECTION, POWDER, FOR SOLUTION INTRAMUSCULAR; INTRAVENOUS at 13:53

## 2023-03-22 RX ADMIN — IPRATROPIUM BROMIDE AND ALBUTEROL SULFATE 1 AMPULE: 2.5; .5 SOLUTION RESPIRATORY (INHALATION) at 11:02

## 2023-03-22 RX ADMIN — Medication 100 MG: at 15:52

## 2023-03-22 RX ADMIN — IPRATROPIUM BROMIDE AND ALBUTEROL SULFATE 1 AMPULE: 2.5; .5 SOLUTION RESPIRATORY (INHALATION) at 20:00

## 2023-03-22 RX ADMIN — AZITHROMYCIN MONOHYDRATE 500 MG: 500 INJECTION, POWDER, LYOPHILIZED, FOR SOLUTION INTRAVENOUS at 05:52

## 2023-03-22 RX ADMIN — WATER 1000 MG: 1 INJECTION INTRAMUSCULAR; INTRAVENOUS; SUBCUTANEOUS at 05:50

## 2023-03-22 RX ADMIN — SODIUM CHLORIDE: 9 INJECTION, SOLUTION INTRAVENOUS at 13:52

## 2023-03-22 RX ADMIN — SODIUM BICARBONATE: 84 INJECTION, SOLUTION INTRAVENOUS at 16:50

## 2023-03-22 RX ADMIN — IPRATROPIUM BROMIDE AND ALBUTEROL SULFATE 1 AMPULE: 2.5; .5 SOLUTION RESPIRATORY (INHALATION) at 07:52

## 2023-03-22 RX ADMIN — LORAZEPAM 0.5 MG: 2 INJECTION INTRAMUSCULAR; INTRAVENOUS at 13:53

## 2023-03-22 RX ADMIN — PANTOPRAZOLE SODIUM 40 MG: 40 TABLET, DELAYED RELEASE ORAL at 12:09

## 2023-03-22 RX ADMIN — METOPROLOL SUCCINATE 50 MG: 50 TABLET, EXTENDED RELEASE ORAL at 12:10

## 2023-03-23 LAB
AMPHET UR QL SCN: NEGATIVE
ANION GAP SERPL CALCULATED.3IONS-SCNC: 14 MMOL/L (ref 7–19)
BARBITURATES UR QL SCN: NEGATIVE
BASOPHILS # BLD: 0 K/UL (ref 0–0.2)
BASOPHILS NFR BLD: 0 % (ref 0–1)
BENZODIAZ UR QL SCN: POSITIVE
BUN SERPL-MCNC: 37 MG/DL (ref 8–23)
BUPRENORPHINE URINE: NEGATIVE
CALCIUM SERPL-MCNC: 8.7 MG/DL (ref 8.8–10.2)
CANNABINOIDS UR QL SCN: NEGATIVE
CHLORIDE SERPL-SCNC: 110 MMOL/L (ref 98–111)
CO2 SERPL-SCNC: 19 MMOL/L (ref 22–29)
COCAINE UR QL SCN: NEGATIVE
CREAT SERPL-MCNC: 0.7 MG/DL (ref 0.5–1.2)
DRUG SCREEN COMMENT UR-IMP: ABNORMAL
EOSINOPHIL # BLD: 0 K/UL (ref 0–0.6)
EOSINOPHIL NFR BLD: 0 % (ref 0–5)
ERYTHROCYTE [DISTWIDTH] IN BLOOD BY AUTOMATED COUNT: 14.2 % (ref 11.5–14.5)
GLUCOSE SERPL-MCNC: 146 MG/DL (ref 74–109)
HCT VFR BLD AUTO: 38.9 % (ref 42–52)
HGB BLD-MCNC: 12.7 G/DL (ref 14–18)
IMM GRANULOCYTES # BLD: 1.2 K/UL
LYMPHOCYTES # BLD: 1.3 K/UL (ref 1.1–4.5)
LYMPHOCYTES NFR BLD: 6 % (ref 20–40)
MCH RBC QN AUTO: 31.6 PG (ref 27–31)
MCHC RBC AUTO-ENTMCNC: 32.6 G/DL (ref 33–37)
MCV RBC AUTO: 96.8 FL (ref 80–94)
METHADONE UR QL SCN: NEGATIVE
METHAMPHETAMINE, URINE: POSITIVE
MONOCYTES # BLD: 1.7 K/UL (ref 0–0.9)
MONOCYTES NFR BLD: 9 % (ref 0–10)
NEUTROPHILS # BLD: 15.6 K/UL (ref 1.5–7.5)
NEUTS BAND NFR BLD MANUAL: 1 % (ref 0–5)
NEUTS SEG NFR BLD: 83 % (ref 50–65)
OPIATES UR QL SCN: NEGATIVE
OXYCODONE UR QL SCN: NEGATIVE
PCP UR QL SCN: NEGATIVE
PLATELET # BLD AUTO: 281 K/UL (ref 130–400)
PLATELET SLIDE REVIEW: ADEQUATE
PMV BLD AUTO: 10.1 FL (ref 9.4–12.4)
POTASSIUM SERPL-SCNC: 4.8 MMOL/L (ref 3.5–5)
PROPOXYPH UR QL SCN: NEGATIVE
RBC # BLD AUTO: 4.02 M/UL (ref 4.7–6.1)
REASON FOR REJECTION: NORMAL
REJECTED TEST: NORMAL
SODIUM SERPL-SCNC: 143 MMOL/L (ref 136–145)
TRICYCLIC, URINE: NEGATIVE
VARIANT LYMPHS NFR BLD: 1 % (ref 0–8)
WBC # BLD AUTO: 18.6 K/UL (ref 4.8–10.8)

## 2023-03-23 PROCEDURE — 6360000002 HC RX W HCPCS: Performed by: HOSPITALIST

## 2023-03-23 PROCEDURE — 85025 COMPLETE CBC W/AUTO DIFF WBC: CPT

## 2023-03-23 PROCEDURE — 1210000000 HC MED SURG R&B

## 2023-03-23 PROCEDURE — 2580000003 HC RX 258: Performed by: NURSE PRACTITIONER

## 2023-03-23 PROCEDURE — 94640 AIRWAY INHALATION TREATMENT: CPT

## 2023-03-23 PROCEDURE — 94761 N-INVAS EAR/PLS OXIMETRY MLT: CPT

## 2023-03-23 PROCEDURE — 94660 CPAP INITIATION&MGMT: CPT

## 2023-03-23 PROCEDURE — 80048 BASIC METABOLIC PNL TOTAL CA: CPT

## 2023-03-23 PROCEDURE — 36415 COLL VENOUS BLD VENIPUNCTURE: CPT

## 2023-03-23 PROCEDURE — 6370000000 HC RX 637 (ALT 250 FOR IP): Performed by: NURSE PRACTITIONER

## 2023-03-23 PROCEDURE — 80306 DRUG TEST PRSMV INSTRMNT: CPT

## 2023-03-23 PROCEDURE — 6370000000 HC RX 637 (ALT 250 FOR IP): Performed by: HOSPITALIST

## 2023-03-23 PROCEDURE — 2700000000 HC OXYGEN THERAPY PER DAY

## 2023-03-23 PROCEDURE — 6370000000 HC RX 637 (ALT 250 FOR IP): Performed by: INTERNAL MEDICINE

## 2023-03-23 PROCEDURE — 6360000002 HC RX W HCPCS: Performed by: NURSE PRACTITIONER

## 2023-03-23 RX ADMIN — IPRATROPIUM BROMIDE AND ALBUTEROL SULFATE 1 AMPULE: 2.5; .5 SOLUTION RESPIRATORY (INHALATION) at 11:23

## 2023-03-23 RX ADMIN — PANTOPRAZOLE SODIUM 40 MG: 40 TABLET, DELAYED RELEASE ORAL at 08:35

## 2023-03-23 RX ADMIN — LORAZEPAM 0.5 MG: 2 INJECTION INTRAMUSCULAR; INTRAVENOUS at 23:17

## 2023-03-23 RX ADMIN — LORAZEPAM 0.5 MG: 2 INJECTION INTRAMUSCULAR; INTRAVENOUS at 08:23

## 2023-03-23 RX ADMIN — Medication 100 MG: at 08:35

## 2023-03-23 RX ADMIN — MONTELUKAST 10 MG: 10 TABLET, FILM COATED ORAL at 23:18

## 2023-03-23 RX ADMIN — METHYLPREDNISOLONE SODIUM SUCCINATE 40 MG: 40 INJECTION, POWDER, FOR SOLUTION INTRAMUSCULAR; INTRAVENOUS at 14:19

## 2023-03-23 RX ADMIN — METHYLPREDNISOLONE SODIUM SUCCINATE 40 MG: 40 INJECTION, POWDER, FOR SOLUTION INTRAMUSCULAR; INTRAVENOUS at 08:22

## 2023-03-23 RX ADMIN — IPRATROPIUM BROMIDE AND ALBUTEROL SULFATE 1 AMPULE: 2.5; .5 SOLUTION RESPIRATORY (INHALATION) at 19:41

## 2023-03-23 RX ADMIN — BUDESONIDE 500 MCG: 0.5 SUSPENSION RESPIRATORY (INHALATION) at 07:54

## 2023-03-23 RX ADMIN — METOPROLOL SUCCINATE 50 MG: 50 TABLET, EXTENDED RELEASE ORAL at 08:35

## 2023-03-23 RX ADMIN — IPRATROPIUM BROMIDE AND ALBUTEROL SULFATE 1 AMPULE: 2.5; .5 SOLUTION RESPIRATORY (INHALATION) at 14:35

## 2023-03-23 RX ADMIN — METHYLPREDNISOLONE SODIUM SUCCINATE 40 MG: 40 INJECTION, POWDER, FOR SOLUTION INTRAMUSCULAR; INTRAVENOUS at 23:17

## 2023-03-23 RX ADMIN — IPRATROPIUM BROMIDE AND ALBUTEROL SULFATE 1 AMPULE: 2.5; .5 SOLUTION RESPIRATORY (INHALATION) at 07:54

## 2023-03-23 RX ADMIN — ENOXAPARIN SODIUM 40 MG: 100 INJECTION SUBCUTANEOUS at 08:36

## 2023-03-23 RX ADMIN — WATER 1000 MG: 1 INJECTION INTRAMUSCULAR; INTRAVENOUS; SUBCUTANEOUS at 08:22

## 2023-03-23 RX ADMIN — BUDESONIDE 500 MCG: 0.5 SUSPENSION RESPIRATORY (INHALATION) at 19:41

## 2023-03-24 LAB
ANION GAP SERPL CALCULATED.3IONS-SCNC: 11 MMOL/L (ref 7–19)
BASOPHILS # BLD: 0 K/UL (ref 0–0.2)
BASOPHILS NFR BLD: 0 % (ref 0–1)
BUN SERPL-MCNC: 54 MG/DL (ref 8–23)
CALCIUM SERPL-MCNC: 8.7 MG/DL (ref 8.8–10.2)
CHLORIDE SERPL-SCNC: 106 MMOL/L (ref 98–111)
CO2 SERPL-SCNC: 29 MMOL/L (ref 22–29)
CREAT SERPL-MCNC: 0.9 MG/DL (ref 0.5–1.2)
EOSINOPHIL # BLD: 0 K/UL (ref 0–0.6)
EOSINOPHIL NFR BLD: 0 % (ref 0–5)
ERYTHROCYTE [DISTWIDTH] IN BLOOD BY AUTOMATED COUNT: 14 % (ref 11.5–14.5)
GLUCOSE SERPL-MCNC: 156 MG/DL (ref 74–109)
HCT VFR BLD AUTO: 37.9 % (ref 42–52)
HGB BLD-MCNC: 12.2 G/DL (ref 14–18)
HYPOCHROMIA BLD QL SMEAR: ABNORMAL
IMM GRANULOCYTES # BLD: 1.5 K/UL
LYMPHOCYTES # BLD: 0.7 K/UL (ref 1.1–4.5)
LYMPHOCYTES NFR BLD: 6 % (ref 20–40)
MCH RBC QN AUTO: 30.9 PG (ref 27–31)
MCHC RBC AUTO-ENTMCNC: 32.2 G/DL (ref 33–37)
MCV RBC AUTO: 95.9 FL (ref 80–94)
METAMYELOCYTES NFR BLD MANUAL: 5 %
MONOCYTES # BLD: 0.1 K/UL (ref 0–0.9)
MONOCYTES NFR BLD: 1 % (ref 0–10)
MYELOCYTES NFR BLD MANUAL: 1 %
NEUTROPHILS # BLD: 11.1 K/UL (ref 1.5–7.5)
NEUTS BAND NFR BLD MANUAL: 2 % (ref 0–5)
NEUTS SEG NFR BLD: 85 % (ref 50–65)
PLATELET # BLD AUTO: 255 K/UL (ref 130–400)
PLATELET SLIDE REVIEW: ADEQUATE
PMV BLD AUTO: 10.2 FL (ref 9.4–12.4)
POTASSIUM SERPL-SCNC: 4.4 MMOL/L (ref 3.5–5)
RBC # BLD AUTO: 3.95 M/UL (ref 4.7–6.1)
SODIUM SERPL-SCNC: 146 MMOL/L (ref 136–145)
TOXIC GRANULATION: ABNORMAL
WBC # BLD AUTO: 11.9 K/UL (ref 4.8–10.8)

## 2023-03-24 PROCEDURE — 2700000000 HC OXYGEN THERAPY PER DAY

## 2023-03-24 PROCEDURE — 94760 N-INVAS EAR/PLS OXIMETRY 1: CPT

## 2023-03-24 PROCEDURE — 6360000002 HC RX W HCPCS: Performed by: NURSE PRACTITIONER

## 2023-03-24 PROCEDURE — 94640 AIRWAY INHALATION TREATMENT: CPT

## 2023-03-24 PROCEDURE — 85025 COMPLETE CBC W/AUTO DIFF WBC: CPT

## 2023-03-24 PROCEDURE — 1210000000 HC MED SURG R&B

## 2023-03-24 PROCEDURE — 36415 COLL VENOUS BLD VENIPUNCTURE: CPT

## 2023-03-24 PROCEDURE — 2580000003 HC RX 258: Performed by: NURSE PRACTITIONER

## 2023-03-24 PROCEDURE — 6370000000 HC RX 637 (ALT 250 FOR IP): Performed by: NURSE PRACTITIONER

## 2023-03-24 PROCEDURE — 94761 N-INVAS EAR/PLS OXIMETRY MLT: CPT

## 2023-03-24 PROCEDURE — 94660 CPAP INITIATION&MGMT: CPT

## 2023-03-24 PROCEDURE — 80048 BASIC METABOLIC PNL TOTAL CA: CPT

## 2023-03-24 PROCEDURE — 6360000002 HC RX W HCPCS: Performed by: HOSPITALIST

## 2023-03-24 PROCEDURE — 6370000000 HC RX 637 (ALT 250 FOR IP): Performed by: HOSPITALIST

## 2023-03-24 RX ADMIN — IPRATROPIUM BROMIDE AND ALBUTEROL SULFATE 1 AMPULE: 2.5; .5 SOLUTION RESPIRATORY (INHALATION) at 19:27

## 2023-03-24 RX ADMIN — BUDESONIDE 500 MCG: 0.5 SUSPENSION RESPIRATORY (INHALATION) at 19:27

## 2023-03-24 RX ADMIN — SODIUM CHLORIDE, PRESERVATIVE FREE 10 ML: 5 INJECTION INTRAVENOUS at 00:56

## 2023-03-24 RX ADMIN — IPRATROPIUM BROMIDE AND ALBUTEROL SULFATE 1 AMPULE: 2.5; .5 SOLUTION RESPIRATORY (INHALATION) at 06:40

## 2023-03-24 RX ADMIN — SODIUM CHLORIDE, PRESERVATIVE FREE 10 ML: 5 INJECTION INTRAVENOUS at 20:47

## 2023-03-24 RX ADMIN — ENOXAPARIN SODIUM 40 MG: 100 INJECTION SUBCUTANEOUS at 09:00

## 2023-03-24 RX ADMIN — Medication 100 MG: at 09:00

## 2023-03-24 RX ADMIN — METHYLPREDNISOLONE SODIUM SUCCINATE 40 MG: 40 INJECTION, POWDER, FOR SOLUTION INTRAMUSCULAR; INTRAVENOUS at 20:47

## 2023-03-24 RX ADMIN — IPRATROPIUM BROMIDE AND ALBUTEROL SULFATE 1 AMPULE: 2.5; .5 SOLUTION RESPIRATORY (INHALATION) at 14:14

## 2023-03-24 RX ADMIN — METHYLPREDNISOLONE SODIUM SUCCINATE 40 MG: 40 INJECTION, POWDER, FOR SOLUTION INTRAMUSCULAR; INTRAVENOUS at 06:35

## 2023-03-24 RX ADMIN — METHYLPREDNISOLONE SODIUM SUCCINATE 40 MG: 40 INJECTION, POWDER, FOR SOLUTION INTRAMUSCULAR; INTRAVENOUS at 11:55

## 2023-03-24 RX ADMIN — WATER 1000 MG: 1 INJECTION INTRAMUSCULAR; INTRAVENOUS; SUBCUTANEOUS at 06:35

## 2023-03-24 RX ADMIN — METOPROLOL SUCCINATE 50 MG: 50 TABLET, EXTENDED RELEASE ORAL at 09:00

## 2023-03-24 RX ADMIN — BUDESONIDE 500 MCG: 0.5 SUSPENSION RESPIRATORY (INHALATION) at 06:40

## 2023-03-24 RX ADMIN — IPRATROPIUM BROMIDE AND ALBUTEROL SULFATE 1 AMPULE: 2.5; .5 SOLUTION RESPIRATORY (INHALATION) at 10:30

## 2023-03-24 RX ADMIN — LORAZEPAM 0.5 MG: 2 INJECTION INTRAMUSCULAR; INTRAVENOUS at 09:02

## 2023-03-24 ASSESSMENT — PAIN SCALES - GENERAL: PAINLEVEL_OUTOF10: 0

## 2023-03-24 NOTE — PLAN OF CARE
Problem: Pain  Goal: Verbalizes/displays adequate comfort level or baseline comfort level  Outcome: Progressing  Flowsheets (Taken 3/24/2023 0115)  Verbalizes/displays adequate comfort level or baseline comfort level: Assess pain using appropriate pain scale     Problem: Safety - Adult  Goal: Free from fall injury  Outcome: Progressing  Flowsheets (Taken 3/24/2023 0104)  Free From Fall Injury: Instruct family/caregiver on patient safety

## 2023-03-25 LAB
ANION GAP SERPL CALCULATED.3IONS-SCNC: 15 MMOL/L (ref 7–19)
BACTERIA BLD CULT ORG #2: NORMAL
BACTERIA BLD CULT: NORMAL
BASOPHILS # BLD: 0 K/UL (ref 0–0.2)
BASOPHILS NFR BLD: 0 % (ref 0–1)
BUN SERPL-MCNC: 57 MG/DL (ref 8–23)
CALCIUM SERPL-MCNC: 8.8 MG/DL (ref 8.8–10.2)
CHLORIDE SERPL-SCNC: 109 MMOL/L (ref 98–111)
CO2 SERPL-SCNC: 30 MMOL/L (ref 22–29)
CREAT SERPL-MCNC: 0.9 MG/DL (ref 0.5–1.2)
EOSINOPHIL # BLD: 0 K/UL (ref 0–0.6)
EOSINOPHIL NFR BLD: 0 % (ref 0–5)
ERYTHROCYTE [DISTWIDTH] IN BLOOD BY AUTOMATED COUNT: 13.9 % (ref 11.5–14.5)
GLUCOSE BLD-MCNC: 151 MG/DL (ref 70–99)
GLUCOSE BLD-MCNC: 158 MG/DL (ref 70–99)
GLUCOSE BLD-MCNC: 189 MG/DL (ref 70–99)
GLUCOSE SERPL-MCNC: 163 MG/DL (ref 74–109)
HCT VFR BLD AUTO: 41.4 % (ref 42–52)
HGB BLD-MCNC: 13.1 G/DL (ref 14–18)
HYPOCHROMIA BLD QL SMEAR: ABNORMAL
IMM GRANULOCYTES # BLD: 1.3 K/UL
LYMPHOCYTES # BLD: 2.5 K/UL (ref 1.1–4.5)
LYMPHOCYTES NFR BLD: 17 % (ref 20–40)
MACROCYTES BLD QL SMEAR: ABNORMAL
MCH RBC QN AUTO: 31.1 PG (ref 27–31)
MCHC RBC AUTO-ENTMCNC: 31.6 G/DL (ref 33–37)
MCV RBC AUTO: 98.3 FL (ref 80–94)
MONOCYTES # BLD: 0.4 K/UL (ref 0–0.9)
MONOCYTES NFR BLD: 3 % (ref 0–10)
NEUTROPHILS # BLD: 11.8 K/UL (ref 1.5–7.5)
NEUTS SEG NFR BLD: 80 % (ref 50–65)
PERFORMED ON: ABNORMAL
PLATELET # BLD AUTO: 288 K/UL (ref 130–400)
PLATELET SLIDE REVIEW: ADEQUATE
PMV BLD AUTO: 10.6 FL (ref 9.4–12.4)
POTASSIUM SERPL-SCNC: 4.5 MMOL/L (ref 3.5–5)
RBC # BLD AUTO: 4.21 M/UL (ref 4.7–6.1)
SODIUM SERPL-SCNC: 154 MMOL/L (ref 136–145)
TOXIC GRANULATION: ABNORMAL
WBC # BLD AUTO: 14.7 K/UL (ref 4.8–10.8)

## 2023-03-25 PROCEDURE — 6370000000 HC RX 637 (ALT 250 FOR IP): Performed by: NURSE PRACTITIONER

## 2023-03-25 PROCEDURE — 2580000003 HC RX 258: Performed by: HOSPITALIST

## 2023-03-25 PROCEDURE — 6360000002 HC RX W HCPCS: Performed by: HOSPITALIST

## 2023-03-25 PROCEDURE — 94660 CPAP INITIATION&MGMT: CPT

## 2023-03-25 PROCEDURE — 6370000000 HC RX 637 (ALT 250 FOR IP): Performed by: INTERNAL MEDICINE

## 2023-03-25 PROCEDURE — 82962 GLUCOSE BLOOD TEST: CPT

## 2023-03-25 PROCEDURE — 2580000003 HC RX 258: Performed by: NURSE PRACTITIONER

## 2023-03-25 PROCEDURE — 36415 COLL VENOUS BLD VENIPUNCTURE: CPT

## 2023-03-25 PROCEDURE — 92522 EVALUATE SPEECH PRODUCTION: CPT

## 2023-03-25 PROCEDURE — 97161 PT EVAL LOW COMPLEX 20 MIN: CPT

## 2023-03-25 PROCEDURE — 80048 BASIC METABOLIC PNL TOTAL CA: CPT

## 2023-03-25 PROCEDURE — 6360000002 HC RX W HCPCS: Performed by: NURSE PRACTITIONER

## 2023-03-25 PROCEDURE — 85025 COMPLETE CBC W/AUTO DIFF WBC: CPT

## 2023-03-25 PROCEDURE — 6370000000 HC RX 637 (ALT 250 FOR IP): Performed by: HOSPITALIST

## 2023-03-25 PROCEDURE — 94761 N-INVAS EAR/PLS OXIMETRY MLT: CPT

## 2023-03-25 PROCEDURE — 97530 THERAPEUTIC ACTIVITIES: CPT

## 2023-03-25 PROCEDURE — 1210000000 HC MED SURG R&B

## 2023-03-25 PROCEDURE — 94640 AIRWAY INHALATION TREATMENT: CPT

## 2023-03-25 PROCEDURE — 92610 EVALUATE SWALLOWING FUNCTION: CPT

## 2023-03-25 PROCEDURE — 2700000000 HC OXYGEN THERAPY PER DAY

## 2023-03-25 RX ORDER — DEXTROSE MONOHYDRATE 100 MG/ML
INJECTION, SOLUTION INTRAVENOUS CONTINUOUS PRN
Status: DISCONTINUED | OUTPATIENT
Start: 2023-03-25 | End: 2023-03-28 | Stop reason: HOSPADM

## 2023-03-25 RX ORDER — INSULIN LISPRO 100 [IU]/ML
0-8 INJECTION, SOLUTION INTRAVENOUS; SUBCUTANEOUS
Status: DISCONTINUED | OUTPATIENT
Start: 2023-03-25 | End: 2023-03-28 | Stop reason: HOSPADM

## 2023-03-25 RX ORDER — INSULIN LISPRO 100 [IU]/ML
0-4 INJECTION, SOLUTION INTRAVENOUS; SUBCUTANEOUS NIGHTLY
Status: DISCONTINUED | OUTPATIENT
Start: 2023-03-25 | End: 2023-03-28 | Stop reason: HOSPADM

## 2023-03-25 RX ORDER — DEXTROSE MONOHYDRATE 50 MG/ML
INJECTION, SOLUTION INTRAVENOUS CONTINUOUS
Status: DISCONTINUED | OUTPATIENT
Start: 2023-03-25 | End: 2023-03-28 | Stop reason: HOSPADM

## 2023-03-25 RX ADMIN — METHYLPREDNISOLONE SODIUM SUCCINATE 40 MG: 40 INJECTION, POWDER, FOR SOLUTION INTRAMUSCULAR; INTRAVENOUS at 10:08

## 2023-03-25 RX ADMIN — IPRATROPIUM BROMIDE AND ALBUTEROL SULFATE 1 AMPULE: 2.5; .5 SOLUTION RESPIRATORY (INHALATION) at 10:55

## 2023-03-25 RX ADMIN — PANTOPRAZOLE SODIUM 40 MG: 40 TABLET, DELAYED RELEASE ORAL at 10:13

## 2023-03-25 RX ADMIN — IPRATROPIUM BROMIDE AND ALBUTEROL SULFATE 1 AMPULE: 2.5; .5 SOLUTION RESPIRATORY (INHALATION) at 18:45

## 2023-03-25 RX ADMIN — ENOXAPARIN SODIUM 40 MG: 100 INJECTION SUBCUTANEOUS at 10:13

## 2023-03-25 RX ADMIN — SODIUM CHLORIDE, PRESERVATIVE FREE 10 ML: 5 INJECTION INTRAVENOUS at 10:15

## 2023-03-25 RX ADMIN — Medication 100 MG: at 10:14

## 2023-03-25 RX ADMIN — WATER 1000 MG: 1 INJECTION INTRAMUSCULAR; INTRAVENOUS; SUBCUTANEOUS at 10:09

## 2023-03-25 RX ADMIN — DEXTROSE MONOHYDRATE: 50 INJECTION, SOLUTION INTRAVENOUS at 13:50

## 2023-03-25 RX ADMIN — IPRATROPIUM BROMIDE AND ALBUTEROL SULFATE 1 AMPULE: 2.5; .5 SOLUTION RESPIRATORY (INHALATION) at 15:56

## 2023-03-25 RX ADMIN — BUDESONIDE 500 MCG: 0.5 SUSPENSION RESPIRATORY (INHALATION) at 06:16

## 2023-03-25 RX ADMIN — METOPROLOL SUCCINATE 50 MG: 50 TABLET, EXTENDED RELEASE ORAL at 10:13

## 2023-03-25 RX ADMIN — IPRATROPIUM BROMIDE AND ALBUTEROL SULFATE 1 AMPULE: 2.5; .5 SOLUTION RESPIRATORY (INHALATION) at 06:16

## 2023-03-25 RX ADMIN — MONTELUKAST 10 MG: 10 TABLET, FILM COATED ORAL at 20:50

## 2023-03-26 LAB
ANION GAP SERPL CALCULATED.3IONS-SCNC: 14 MMOL/L (ref 7–19)
BASOPHILS # BLD: 0 K/UL (ref 0–0.2)
BASOPHILS NFR BLD: 0 % (ref 0–1)
BUN SERPL-MCNC: 60 MG/DL (ref 8–23)
CALCIUM SERPL-MCNC: 8.6 MG/DL (ref 8.8–10.2)
CHLORIDE SERPL-SCNC: 106 MMOL/L (ref 98–111)
CO2 SERPL-SCNC: 26 MMOL/L (ref 22–29)
CREAT SERPL-MCNC: 0.9 MG/DL (ref 0.5–1.2)
EOSINOPHIL # BLD: 0 K/UL (ref 0–0.6)
EOSINOPHIL NFR BLD: 0 % (ref 0–5)
ERYTHROCYTE [DISTWIDTH] IN BLOOD BY AUTOMATED COUNT: 13.6 % (ref 11.5–14.5)
GLUCOSE BLD-MCNC: 103 MG/DL (ref 70–99)
GLUCOSE BLD-MCNC: 111 MG/DL (ref 70–99)
GLUCOSE BLD-MCNC: 136 MG/DL (ref 70–99)
GLUCOSE SERPL-MCNC: 158 MG/DL (ref 74–109)
HCT VFR BLD AUTO: 40.4 % (ref 42–52)
HGB BLD-MCNC: 12.3 G/DL (ref 14–18)
HYPOCHROMIA BLD QL SMEAR: ABNORMAL
IMM GRANULOCYTES # BLD: 1.4 K/UL
LYMPHOCYTES # BLD: 1 K/UL (ref 1.1–4.5)
LYMPHOCYTES NFR BLD: 6 % (ref 20–40)
MACROCYTES BLD QL SMEAR: ABNORMAL
MCH RBC QN AUTO: 30.8 PG (ref 27–31)
MCHC RBC AUTO-ENTMCNC: 30.4 G/DL (ref 33–37)
MCV RBC AUTO: 101 FL (ref 80–94)
METAMYELOCYTES NFR BLD MANUAL: 3 %
MONOCYTES # BLD: 0.2 K/UL (ref 0–0.9)
MONOCYTES NFR BLD: 1 % (ref 0–10)
NEUTROPHILS # BLD: 15.4 K/UL (ref 1.5–7.5)
NEUTS BAND NFR BLD MANUAL: 4 % (ref 0–5)
NEUTS SEG NFR BLD: 86 % (ref 50–65)
PERFORMED ON: ABNORMAL
PLATELET # BLD AUTO: 226 K/UL (ref 130–400)
PLATELET SLIDE REVIEW: ADEQUATE
PMV BLD AUTO: 11.3 FL (ref 9.4–12.4)
POTASSIUM SERPL-SCNC: 4.5 MMOL/L (ref 3.5–5)
RBC # BLD AUTO: 4 M/UL (ref 4.7–6.1)
SODIUM SERPL-SCNC: 146 MMOL/L (ref 136–145)
TOXIC GRANULATION: ABNORMAL
WBC # BLD AUTO: 16.6 K/UL (ref 4.8–10.8)

## 2023-03-26 PROCEDURE — 6370000000 HC RX 637 (ALT 250 FOR IP): Performed by: HOSPITALIST

## 2023-03-26 PROCEDURE — 6360000002 HC RX W HCPCS: Performed by: NURSE PRACTITIONER

## 2023-03-26 PROCEDURE — 82962 GLUCOSE BLOOD TEST: CPT

## 2023-03-26 PROCEDURE — 2580000003 HC RX 258: Performed by: HOSPITALIST

## 2023-03-26 PROCEDURE — 6370000000 HC RX 637 (ALT 250 FOR IP): Performed by: NURSE PRACTITIONER

## 2023-03-26 PROCEDURE — 94640 AIRWAY INHALATION TREATMENT: CPT

## 2023-03-26 PROCEDURE — 2580000003 HC RX 258: Performed by: NURSE PRACTITIONER

## 2023-03-26 PROCEDURE — 36415 COLL VENOUS BLD VENIPUNCTURE: CPT

## 2023-03-26 PROCEDURE — 6370000000 HC RX 637 (ALT 250 FOR IP): Performed by: INTERNAL MEDICINE

## 2023-03-26 PROCEDURE — 85025 COMPLETE CBC W/AUTO DIFF WBC: CPT

## 2023-03-26 PROCEDURE — 2700000000 HC OXYGEN THERAPY PER DAY

## 2023-03-26 PROCEDURE — 94761 N-INVAS EAR/PLS OXIMETRY MLT: CPT

## 2023-03-26 PROCEDURE — 1210000000 HC MED SURG R&B

## 2023-03-26 PROCEDURE — 94660 CPAP INITIATION&MGMT: CPT

## 2023-03-26 PROCEDURE — 80048 BASIC METABOLIC PNL TOTAL CA: CPT

## 2023-03-26 RX ORDER — GUAIFENESIN 600 MG/1
600 TABLET, EXTENDED RELEASE ORAL DAILY
COMMUNITY

## 2023-03-26 RX ORDER — METOPROLOL SUCCINATE 25 MG/1
25 TABLET, EXTENDED RELEASE ORAL DAILY
Status: DISCONTINUED | OUTPATIENT
Start: 2023-03-26 | End: 2023-03-28 | Stop reason: HOSPADM

## 2023-03-26 RX ADMIN — BUDESONIDE 500 MCG: 0.5 SUSPENSION RESPIRATORY (INHALATION) at 07:28

## 2023-03-26 RX ADMIN — WATER 1000 MG: 1 INJECTION INTRAMUSCULAR; INTRAVENOUS; SUBCUTANEOUS at 08:56

## 2023-03-26 RX ADMIN — Medication 100 MG: at 08:56

## 2023-03-26 RX ADMIN — METOPROLOL SUCCINATE 25 MG: 25 TABLET, EXTENDED RELEASE ORAL at 08:56

## 2023-03-26 RX ADMIN — SODIUM CHLORIDE, PRESERVATIVE FREE 10 ML: 5 INJECTION INTRAVENOUS at 08:56

## 2023-03-26 RX ADMIN — ENOXAPARIN SODIUM 40 MG: 100 INJECTION SUBCUTANEOUS at 08:56

## 2023-03-26 RX ADMIN — PANTOPRAZOLE SODIUM 40 MG: 40 TABLET, DELAYED RELEASE ORAL at 08:56

## 2023-03-26 RX ADMIN — BUDESONIDE 500 MCG: 0.5 SUSPENSION RESPIRATORY (INHALATION) at 19:37

## 2023-03-26 RX ADMIN — IPRATROPIUM BROMIDE AND ALBUTEROL SULFATE 1 AMPULE: 2.5; .5 SOLUTION RESPIRATORY (INHALATION) at 15:27

## 2023-03-26 RX ADMIN — DEXTROSE MONOHYDRATE: 50 INJECTION, SOLUTION INTRAVENOUS at 08:54

## 2023-03-26 RX ADMIN — IPRATROPIUM BROMIDE AND ALBUTEROL SULFATE 1 AMPULE: 2.5; .5 SOLUTION RESPIRATORY (INHALATION) at 19:37

## 2023-03-26 RX ADMIN — IPRATROPIUM BROMIDE AND ALBUTEROL SULFATE 1 AMPULE: 2.5; .5 SOLUTION RESPIRATORY (INHALATION) at 10:52

## 2023-03-26 RX ADMIN — IPRATROPIUM BROMIDE AND ALBUTEROL SULFATE 1 AMPULE: 2.5; .5 SOLUTION RESPIRATORY (INHALATION) at 07:27

## 2023-03-26 RX ADMIN — MONTELUKAST 10 MG: 10 TABLET, FILM COATED ORAL at 20:07

## 2023-03-27 LAB
ANION GAP SERPL CALCULATED.3IONS-SCNC: 10 MMOL/L (ref 7–19)
BASOPHILS # BLD: 0 K/UL (ref 0–0.2)
BASOPHILS NFR BLD: 0 % (ref 0–1)
BUN SERPL-MCNC: 56 MG/DL (ref 8–23)
CALCIUM SERPL-MCNC: 8.7 MG/DL (ref 8.8–10.2)
CHLORIDE SERPL-SCNC: 106 MMOL/L (ref 98–111)
CO2 SERPL-SCNC: 27 MMOL/L (ref 22–29)
CREAT SERPL-MCNC: 1 MG/DL (ref 0.5–1.2)
EOSINOPHIL # BLD: 0.14 K/UL (ref 0–0.6)
EOSINOPHIL NFR BLD: 1 % (ref 0–5)
ERYTHROCYTE [DISTWIDTH] IN BLOOD BY AUTOMATED COUNT: 13.7 % (ref 11.5–14.5)
GLUCOSE BLD-MCNC: 122 MG/DL (ref 70–99)
GLUCOSE BLD-MCNC: 137 MG/DL (ref 70–99)
GLUCOSE BLD-MCNC: 153 MG/DL (ref 70–99)
GLUCOSE BLD-MCNC: 43 MG/DL (ref 70–99)
GLUCOSE BLD-MCNC: 57 MG/DL (ref 70–99)
GLUCOSE BLD-MCNC: 86 MG/DL (ref 70–99)
GLUCOSE BLD-MCNC: 97 MG/DL (ref 70–99)
GLUCOSE SERPL-MCNC: 128 MG/DL (ref 74–109)
HCT VFR BLD AUTO: 40.8 % (ref 42–52)
HGB BLD-MCNC: 12.8 G/DL (ref 14–18)
HYPOCHROMIA BLD QL SMEAR: ABNORMAL
IMM GRANULOCYTES # BLD: 1.2 K/UL
LYMPHOCYTES # BLD: 1.4 K/UL (ref 1.1–4.5)
LYMPHOCYTES NFR BLD: 5 % (ref 20–40)
MACROCYTES BLD QL SMEAR: ABNORMAL
MCH RBC QN AUTO: 30.8 PG (ref 27–31)
MCHC RBC AUTO-ENTMCNC: 31.4 G/DL (ref 33–37)
MCV RBC AUTO: 98.1 FL (ref 80–94)
METAMYELOCYTES NFR BLD MANUAL: 2 %
MONOCYTES # BLD: 0.3 K/UL (ref 0–0.9)
MONOCYTES NFR BLD: 2 % (ref 0–10)
NEUTROPHILS # BLD: 11.7 K/UL (ref 1.5–7.5)
NEUTS BAND NFR BLD MANUAL: 1 % (ref 0–5)
NEUTS SEG NFR BLD: 84 % (ref 50–65)
NRBC BLD-RTO: 3 /100 WBC
PERFORMED ON: ABNORMAL
PERFORMED ON: NORMAL
PERFORMED ON: NORMAL
PLATELET # BLD AUTO: 216 K/UL (ref 130–400)
PLATELET SLIDE REVIEW: ADEQUATE
PMV BLD AUTO: 11.8 FL (ref 9.4–12.4)
POTASSIUM SERPL-SCNC: 4.9 MMOL/L (ref 3.5–5)
RBC # BLD AUTO: 4.16 M/UL (ref 4.7–6.1)
SODIUM SERPL-SCNC: 143 MMOL/L (ref 136–145)
TOXIC GRANULATION: ABNORMAL
VARIANT LYMPHS NFR BLD: 5 % (ref 0–8)
WBC # BLD AUTO: 13.5 K/UL (ref 4.8–10.8)

## 2023-03-27 PROCEDURE — 85025 COMPLETE CBC W/AUTO DIFF WBC: CPT

## 2023-03-27 PROCEDURE — 6370000000 HC RX 637 (ALT 250 FOR IP): Performed by: HOSPITALIST

## 2023-03-27 PROCEDURE — 6370000000 HC RX 637 (ALT 250 FOR IP): Performed by: INTERNAL MEDICINE

## 2023-03-27 PROCEDURE — 94640 AIRWAY INHALATION TREATMENT: CPT

## 2023-03-27 PROCEDURE — 2580000003 HC RX 258: Performed by: NURSE PRACTITIONER

## 2023-03-27 PROCEDURE — 36415 COLL VENOUS BLD VENIPUNCTURE: CPT

## 2023-03-27 PROCEDURE — 82962 GLUCOSE BLOOD TEST: CPT

## 2023-03-27 PROCEDURE — 2700000000 HC OXYGEN THERAPY PER DAY

## 2023-03-27 PROCEDURE — 97530 THERAPEUTIC ACTIVITIES: CPT

## 2023-03-27 PROCEDURE — 97165 OT EVAL LOW COMPLEX 30 MIN: CPT

## 2023-03-27 PROCEDURE — 94761 N-INVAS EAR/PLS OXIMETRY MLT: CPT

## 2023-03-27 PROCEDURE — 6370000000 HC RX 637 (ALT 250 FOR IP): Performed by: NURSE PRACTITIONER

## 2023-03-27 PROCEDURE — 1210000000 HC MED SURG R&B

## 2023-03-27 PROCEDURE — 80048 BASIC METABOLIC PNL TOTAL CA: CPT

## 2023-03-27 PROCEDURE — 6360000002 HC RX W HCPCS: Performed by: NURSE PRACTITIONER

## 2023-03-27 PROCEDURE — 94660 CPAP INITIATION&MGMT: CPT

## 2023-03-27 RX ADMIN — ENOXAPARIN SODIUM 40 MG: 100 INJECTION SUBCUTANEOUS at 10:27

## 2023-03-27 RX ADMIN — Medication 100 MG: at 08:50

## 2023-03-27 RX ADMIN — BUDESONIDE 500 MCG: 0.5 SUSPENSION RESPIRATORY (INHALATION) at 06:06

## 2023-03-27 RX ADMIN — IPRATROPIUM BROMIDE AND ALBUTEROL SULFATE 1 AMPULE: 2.5; .5 SOLUTION RESPIRATORY (INHALATION) at 20:26

## 2023-03-27 RX ADMIN — IPRATROPIUM BROMIDE AND ALBUTEROL SULFATE 1 AMPULE: 2.5; .5 SOLUTION RESPIRATORY (INHALATION) at 06:06

## 2023-03-27 RX ADMIN — BUDESONIDE 500 MCG: 0.5 SUSPENSION RESPIRATORY (INHALATION) at 20:26

## 2023-03-27 RX ADMIN — MONTELUKAST 10 MG: 10 TABLET, FILM COATED ORAL at 20:04

## 2023-03-27 RX ADMIN — IPRATROPIUM BROMIDE AND ALBUTEROL SULFATE 1 AMPULE: 2.5; .5 SOLUTION RESPIRATORY (INHALATION) at 14:10

## 2023-03-27 RX ADMIN — SODIUM CHLORIDE, PRESERVATIVE FREE 10 ML: 5 INJECTION INTRAVENOUS at 20:04

## 2023-03-27 RX ADMIN — IPRATROPIUM BROMIDE AND ALBUTEROL SULFATE 1 AMPULE: 2.5; .5 SOLUTION RESPIRATORY (INHALATION) at 10:09

## 2023-03-27 RX ADMIN — METOPROLOL SUCCINATE 25 MG: 25 TABLET, EXTENDED RELEASE ORAL at 08:50

## 2023-03-27 NOTE — CARE COORDINATION
The [address] at [facility]  Notification of Admission Decision      [] Patient has been accepted for admit to [facility] on :       Please write discharge orders and summary prior to discharge. [] Patient acceptance to Rehab pending the following :    [] Eval in progress       [x] Patient determined to be ineligible for services at [facility] because : limited participation with therapy, do not feel he will participate in the required 3 hrs of therapy a day. We recommend you consider : SNF       Thank you for your referral, we appreciate you. If you have any questions, please feel   free to contact me at [phone].    Electronically Signed by Ashvin Acosta Admissions Coordinator 3/27/2023 9:05 AM

## 2023-03-27 NOTE — CARE COORDINATION
Met with patient to discuss Discharge plan. Provided patient with SNF Choice List.  Patient granted permission for this CM to call his spouse. Called patient's spouse, Elizabeth Dumas. She is agreeable to send referrals to [facility]. Referrals sent to [facility] and [facility]. Awaiting responses. [facility]  [phone]  [phone] [facility] [phone]  F [phone]  Electronically signed by Seferino Schwab, RN on 3/27/2023 at 9:43 AM    Per Brady Ybarra unable to accept patient. Electronically signed by Seferino Schwab, RN on 3/27/2023 at 11:28 AM    Per Lila Naik is able to offer bed. Called patient's spouse. She accepts bed offer. Per [facility], patient can be admitted to their facility tomorrow, 3/28/2023.   Electronically signed by Seferino Schwab, RN on 3/27/2023 at 2:14 PM

## 2023-03-28 VITALS
HEART RATE: 77 BPM | SYSTOLIC BLOOD PRESSURE: 130 MMHG | HEIGHT: 68 IN | RESPIRATION RATE: 18 BRPM | DIASTOLIC BLOOD PRESSURE: 78 MMHG | OXYGEN SATURATION: 95 % | BODY MASS INDEX: 21.07 KG/M2 | WEIGHT: 139 LBS | TEMPERATURE: 97 F

## 2023-03-28 LAB
ANION GAP SERPL CALCULATED.3IONS-SCNC: 5 MMOL/L (ref 7–19)
BASOPHILS # BLD: 0.1 K/UL (ref 0–0.2)
BASOPHILS NFR BLD: 0.7 % (ref 0–1)
BUN SERPL-MCNC: 41 MG/DL (ref 8–23)
CALCIUM SERPL-MCNC: 8.5 MG/DL (ref 8.8–10.2)
CHLORIDE SERPL-SCNC: 102 MMOL/L (ref 98–111)
CO2 SERPL-SCNC: 35 MMOL/L (ref 22–29)
CREAT SERPL-MCNC: 0.9 MG/DL (ref 0.5–1.2)
EOSINOPHIL # BLD: 0.1 K/UL (ref 0–0.6)
EOSINOPHIL NFR BLD: 0.5 % (ref 0–5)
ERYTHROCYTE [DISTWIDTH] IN BLOOD BY AUTOMATED COUNT: 13.6 % (ref 11.5–14.5)
GLUCOSE BLD-MCNC: 123 MG/DL (ref 70–99)
GLUCOSE BLD-MCNC: 96 MG/DL (ref 70–99)
GLUCOSE SERPL-MCNC: 101 MG/DL (ref 74–109)
HCT VFR BLD AUTO: 39.4 % (ref 42–52)
HGB BLD-MCNC: 12.6 G/DL (ref 14–18)
IMM GRANULOCYTES # BLD: 1.1 K/UL
LYMPHOCYTES # BLD: 1.5 K/UL (ref 1.1–4.5)
LYMPHOCYTES NFR BLD: 8.7 % (ref 20–40)
MCH RBC QN AUTO: 31.2 PG (ref 27–31)
MCHC RBC AUTO-ENTMCNC: 32 G/DL (ref 33–37)
MCV RBC AUTO: 97.5 FL (ref 80–94)
MONOCYTES # BLD: 1.3 K/UL (ref 0–0.9)
MONOCYTES NFR BLD: 7.3 % (ref 0–10)
NEUTROPHILS # BLD: 13.4 K/UL (ref 1.5–7.5)
NEUTS SEG NFR BLD: 76.3 % (ref 50–65)
PERFORMED ON: ABNORMAL
PERFORMED ON: NORMAL
PLATELET # BLD AUTO: 252 K/UL (ref 130–400)
PMV BLD AUTO: 11.6 FL (ref 9.4–12.4)
POTASSIUM SERPL-SCNC: 3.7 MMOL/L (ref 3.5–5)
RBC # BLD AUTO: 4.04 M/UL (ref 4.7–6.1)
SODIUM SERPL-SCNC: 142 MMOL/L (ref 136–145)
WBC # BLD AUTO: 17.5 K/UL (ref 4.8–10.8)

## 2023-03-28 PROCEDURE — 6360000002 HC RX W HCPCS: Performed by: NURSE PRACTITIONER

## 2023-03-28 PROCEDURE — 6370000000 HC RX 637 (ALT 250 FOR IP): Performed by: HOSPITALIST

## 2023-03-28 PROCEDURE — 36415 COLL VENOUS BLD VENIPUNCTURE: CPT

## 2023-03-28 PROCEDURE — 6370000000 HC RX 637 (ALT 250 FOR IP): Performed by: NURSE PRACTITIONER

## 2023-03-28 PROCEDURE — 80048 BASIC METABOLIC PNL TOTAL CA: CPT

## 2023-03-28 PROCEDURE — 82962 GLUCOSE BLOOD TEST: CPT

## 2023-03-28 PROCEDURE — 97530 THERAPEUTIC ACTIVITIES: CPT

## 2023-03-28 PROCEDURE — 2580000003 HC RX 258: Performed by: NURSE PRACTITIONER

## 2023-03-28 PROCEDURE — 94640 AIRWAY INHALATION TREATMENT: CPT

## 2023-03-28 PROCEDURE — 94760 N-INVAS EAR/PLS OXIMETRY 1: CPT

## 2023-03-28 PROCEDURE — 85025 COMPLETE CBC W/AUTO DIFF WBC: CPT

## 2023-03-28 RX ORDER — THIAMINE MONONITRATE (VIT B1) 100 MG
100 TABLET ORAL DAILY
Qty: 30 TABLET | Refills: 0 | Status: SHIPPED | OUTPATIENT
Start: 2023-03-29

## 2023-03-28 RX ORDER — METOPROLOL SUCCINATE 25 MG/1
25 TABLET, EXTENDED RELEASE ORAL DAILY
Qty: 30 TABLET | Refills: 3 | Status: SHIPPED | OUTPATIENT
Start: 2023-03-29

## 2023-03-28 RX ORDER — PANTOPRAZOLE SODIUM 40 MG/1
40 TABLET, DELAYED RELEASE ORAL
Qty: 30 TABLET | Refills: 3 | Status: SHIPPED | OUTPATIENT
Start: 2023-03-29

## 2023-03-28 RX ADMIN — PANTOPRAZOLE SODIUM 40 MG: 40 TABLET, DELAYED RELEASE ORAL at 08:20

## 2023-03-28 RX ADMIN — ENOXAPARIN SODIUM 40 MG: 100 INJECTION SUBCUTANEOUS at 08:30

## 2023-03-28 RX ADMIN — IPRATROPIUM BROMIDE AND ALBUTEROL SULFATE 1 AMPULE: 2.5; .5 SOLUTION RESPIRATORY (INHALATION) at 07:02

## 2023-03-28 RX ADMIN — BUDESONIDE 500 MCG: 0.5 SUSPENSION RESPIRATORY (INHALATION) at 07:02

## 2023-03-28 RX ADMIN — SODIUM CHLORIDE, PRESERVATIVE FREE 10 ML: 5 INJECTION INTRAVENOUS at 08:21

## 2023-03-28 RX ADMIN — Medication 100 MG: at 08:20

## 2023-03-28 RX ADMIN — METOPROLOL SUCCINATE 25 MG: 25 TABLET, EXTENDED RELEASE ORAL at 08:20

## 2023-03-28 RX ADMIN — IPRATROPIUM BROMIDE AND ALBUTEROL SULFATE 1 AMPULE: 2.5; .5 SOLUTION RESPIRATORY (INHALATION) at 10:25

## 2023-03-28 NOTE — PLAN OF CARE
Problem: Pain  Goal: Verbalizes/displays adequate comfort level or baseline comfort level  Outcome: Progressing     Problem: Safety - Adult  Goal: Free from fall injury  Outcome: Progressing  Flowsheets (Taken 3/27/2023 2032)  Free From Fall Injury: Instruct family/caregiver on patient safety

## 2023-03-28 NOTE — PLAN OF CARE
Problem: Pain  Goal: Verbalizes/displays adequate comfort level or baseline comfort level  Outcome: Completed     Problem: Safety - Adult  Goal: Free from fall injury  Outcome: Completed     Problem: ABCDS Injury Assessment  Goal: Absence of physical injury  Outcome: Completed     Problem: Skin/Tissue Integrity  Goal: Absence of new skin breakdown  Description: 1. Monitor for areas of redness and/or skin breakdown  2. Assess vascular access sites hourly  3. Every 4-6 hours minimum:  Change oxygen saturation probe site  4. Every 4-6 hours:  If on nasal continuous positive airway pressure, respiratory therapy assess nares and determine need for appliance change or resting period.   Outcome: Completed

## 2023-03-28 NOTE — CARE COORDINATION
03/28/23 1113   IMM Letter   IMM Letter given to Patient/Family/Significant other/Guardian/POA/by: Zenaida Moreland presented Pt with IMM letter. IMM Letter date given: 03/28/23   IMM Letter time given: 200   Sw presented Pt with IMM letter. Zenaida explained Pt rights and letter. Pt waived 4 hour appeal period. No other questions or concerns.   Electronically signed by ENA Painting on 3/28/2023 at 11:13 AM

## 2023-03-28 NOTE — DISCHARGE SUMMARY
Budeson-Glycopyrrol-Formoterol     BRONKAID MAX PO     guaiFENesin 600 MG extended release tablet  Commonly known as: MUCINEX     Ventolin  (90 Base) MCG/ACT inhaler  Generic drug: albuterol sulfate HFA               Where to Get Your Medications        Information about where to get these medications is not yet available    Ask your nurse or doctor about these medications  metoprolol succinate 25 MG extended release tablet  pantoprazole 40 MG tablet  vitamin B-1 100 MG tablet         Electronically signed by Javi Valverde MD on 3/28/23 at 10:21 AM CDT

## 2023-03-28 NOTE — PROGRESS NOTES
03/27/23 1300   Subjective   Subjective Patient in bed agrees to sit EOB and attempt standing. Pain Assessment   Pain Assessment None - Denies Pain   Cognition   Overall Cognitive Status WFL   Orientation   Overall Orientation Status WFL   Vitals   O2 Device Nasal cannula   Comment 2LNC   Bed Mobility Training   Bed Mobility Training Yes   Supine to Sit Minimum assistance   Sit to Supine Moderate assistance   Scooting Total assistance  (up in bed)   Balance   Sitting Intact   Standing With support  (Stood up briefly bed side could not step.)   Transfer Training   Transfer Training Yes   Sit to Stand Maximum assistance   Stand to Sit Minimum assistance   Patient Education   Education Given To Patient   Education Provided Role of Therapy;Plan of Care   Education Method Verbal   Barriers to Learning None   Education Outcome Verbalized understanding;Demonstrated understanding;Continued education needed   Other Specialty Interventions   Other Treatments/Modalities Patient BTB all needs in reach.        Electronically signed by Marcellus Rain PTA on 3/27/2023 at 2:11 PM
1740 PATIENT PLACED ON CPAP 10 2 L/M
Called to inpatient unit to assist with IV placement. Attempt x2 made by this nurse 20ga 1.88 inch placed to LFA flushes with ease.
Comprehensive Nutrition Assessment    Type and Reason for Visit:  Initial    Nutrition Recommendations/Plan:   Start frozen milkshake at [facility] [employer] [facility]     Malnutrition Assessment:  Malnutrition Status:  No malnutrition (03/21/23 1118)    Context:  Acute Illness     Findings of the 6 clinical characteristics of malnutrition:  Energy Intake:  Mild decrease in energy intake (Comment)  Weight Loss:  No significant weight loss     Body Fat Loss:  Mild body fat loss Orbital, Buccal region   Muscle Mass Loss:  No significant muscle mass loss    Fluid Accumulation:  No significant fluid accumulation Extremities   Strength:  Not Performed    Nutrition Assessment:    Following patient for low BMI = 19.8. Pt is receiving a Regular diet. ...did not eat breakfast.  Lunch has not been delivered at this time. Starting frozen milk shakes to help with calorie and protein intake. Weight is consistent    Nutrition Related Findings:      Wound Type: Skin Tears       Current Nutrition Intake & Therapies:    Average Meal Intake: 0%  Average Supplements Intake: None Ordered  ADULT DIET; Regular  ADULT ORAL NUTRITION SUPPLEMENT; Lunch, Dinner; Other Oral Supplement; $ oz frozen milkshake  L-Chocolate,   D-Vanilla    Anthropometric Measures:  Height: 5' 8\" (172.7 cm)  Ideal Body Weight (IBW): 154 lbs (70 kg)    Admission Body Weight: 130 lb (59 kg)  Current Body Weight: 130 lb (59 kg), 84.4 % IBW.  Weight Source: Stated  Current BMI (kg/m2): 19.8  Usual Body Weight: 139 lb (63 kg) (3/2022)  % Weight Change (Calculated): -6.5  BMI Categories: Underweight (BMI less than 22) age over 72    Estimated Daily Nutrient Needs:  Energy Requirements Based On: Kcal/kg  Weight Used for Energy Requirements: Current  Energy (kcal/day): 0510-0806 kcals (25-30 kcals/kg)  Weight Used for Protein Requirements: Current  Protein (g/day): 59-118g  Method Used for Fluid Requirements: 1 ml/kcal  Fluid (ml/day): 5278-9634 ml    Nutrition Diagnosis:   Underweight
Entered patients room to find a large pocket knife laying on his table over his lap. Removed pocket knife from table and locked it in patients cabinet with the rest of his belongings. Patient appears restless, and keeps searching for something. Has asked for his \"bronkaid\" in his pants pocket multiple times. .... Have kept patients belongings locked up and have not provided him access to his belongings after urine drug screen came back positive for meth and benzos.        Electronically signed by Malina Smith RN on 3/26/2023 at 12:02 PM
Occupational Therapy  The patient adamantly declines evaluation or assist out of bed. No evidence of logic in his reasoning. Will sign off.  Electronically signed by Koko Solorio OT on 3/21/2023 at 11:57 AM
Patient sats at 100% with oxygen on. However, when he takes it off, he becomes cyanotic in his hands and feet. Entered patients room to give him a new IV and noticed his fingers had turned from mottled to blue since the morning assessment. Reapplied patients nasal canula. Respiratory arrived, and applied a forehead probe for patients continuous pulse ox, as the finger probe was not reading. Provided patient with a warm blanket to warm up hands and feet. Notified charge nurse. Got a fresh set of vitals. They were stable. Patient showed improvement when reassessed 10 minutes later. Sent Dr. Selena Fair an update.
Physical Therapy    Eval attempted. Pt strongly declines any mobility attempt at this time despite encouragement and reassurance on fall precautions. Pt states he does not want a therapy eval while he is here. Nsg to assess basic transfers when pt willing.     Electronically signed by Jennifer Dear, COMPA on 3/20/2023 at 1:50 PM
Physical Therapy  Attempted PT eval, but pT did not open eyes or appear aware PT was present.   Electronically signed by Sisi Trujillo PT on 3/24/2023 at 10:25 AM
Physical Therapy  Attempted PT eval, but pt was c/o being SOA.  RN present to assess and states to wait on PT eval.  Electronically signed by Ryland Gr PT on 3/23/2023 at 2:05 PM
Physical Therapy  Facility/Department: [facility] 4 ONCOLOGY UNIT  Physical Therapy Initial Assessment    Name: Apolonia Hinkle  : 1955  MRN: [id number]  Date of Service: 3/25/2023    Discharge Recommendations:  Continue to assess pending progress, Patient would benefit from continued therapy after discharge          Patient Diagnosis(es): The primary encounter diagnosis was Septicemia [facility]). Diagnoses of COPD exacerbation ([facility] [address] and Pneumonia of left lower lobe due to infectious organism were also pertinent to this visit. Past Medical History:  has a past medical history of COPD (chronic obstructive pulmonary disease) ([facility] [address] Past Surgical History:  has no past surgical history on file. Assessment   Body Structures, Functions, Activity Limitations Requiring Skilled Therapeutic Intervention: Decreased functional mobility ; Decreased ADL status; Decreased strength;Decreased endurance;Decreased balance;Decreased posture  Assessment: Pt AGREED TO SIT EOB FOR SEVERAL MINUTES, DECLINES CHAIR AT THIS TIME. WILL PROGRESS WITH TRANSFERS AND GT. Requires PT Follow-Up: Yes  Activity Tolerance  Activity Tolerance: Patient limited by endurance; Patient limited by fatigue     Plan   Physcial Therapy Plan  General Plan: 5-7 times per week  Current Treatment Recommendations: Strengthening, Balance training, Functional mobility training, Transfer training, Endurance training, Gait training, Safety education & training, Patient/Caregiver education & training  Safety Devices  Type of Devices: Call light within reach (BED ALARM WOULD NOT ACTIVATE, WAS NOT ON UPON ENTERING ROOM)     Restrictions  Restrictions/Precautions  Restrictions/Precautions: Fall Risk  Position Activity Restriction  Other position/activity restrictions: SPECIALTY MATTRESS     Subjective   Pain: DENIES  General  Diagnosis: PNA, COPD EXAC, ETOH/METH ABUSE  Subjective  Subjective: Pt GROGGY BUT AGREES TO SIT EOB         Social/Functional History  Social/Functional
Physical Therapy  Name: Elisa Rush  MRN:  [id number]  Date of service:  3/28/2023       03/28/23 1001   Restrictions/Precautions   Restrictions/Precautions Fall Risk   Position Activity Restriction   Other position/activity restrictions SPECIALTY MATTRESS   Subjective   Subjective Pt agreed to therapy. Reports his legs are very weak. Pain Assessment   Pain Assessment None - Denies Pain   Bed Mobility   Bridging Stand by assistance   Supine to Sit Stand by assistance   Sit to Supine Stand by assistance   Scooting Stand by assistance   Transfers   Sit to Stand Moderate Assistance   Stand to Sit Stand by assistance   Bed to Chair   (pt declined to attempt transfer to chair)   Comment attempted standing 3x then able to achieve stand, pt maintained flexed posture and fatigued quickly in standing   Exercises   Knee Long Arc Quad x10   Ankle Pumps x10   Comments sat EOB and performed BLE exercise   Short Term Goals   Time Frame for Short Term Goals 14 DAYS   Short Term Goal 1 BED MOB INDEPENDENT   Short Term Goal 2 TRANSFERS INDEPENDENT   Short Term Goal 3 AMB 48' AAD SBA   Conditions Requiring Skilled Therapeutic Intervention   Body Structures, Functions, Activity Limitations Requiring Skilled Therapeutic Intervention Decreased functional mobility ; Decreased ADL status; Decreased strength;Decreased endurance;Decreased balance;Decreased posture   Assessment Pt able to perform one stand but fatigued quickly. Able to complete bed mobility SBA and repositioned self in bed. Declined up to chair at this time. All needs in reach. Activity Tolerance   Activity Tolerance Patient limited by fatigue;Patient limited by endurance   PT Plan of Care   Tuesday X   Safety Devices   Type of Devices Bed alarm in place;Call light within reach; Left in bed       Electronically signed by Dru Cannon PTA on 3/28/2023 at 10:08 AM
Progress Note      Date:3/24/2023       Room:0425/425-02  Patient Name:Pradip Oviedo     YOB: 1955     Age:68 y.o. Subjective    Subjective: 70year old male with past medical history of COPD uses PRN O2 per NC at home who presented to [facility] ED with complaints of shortness of breath. Chest x-ray indicated left lower lobe pulmonary opacity. CT head unremarkable for acute findings. Patient was admitted and currently being treated for pneumonia as well as COPD exacerbation. On 3/22; patient with increased use of abdominal muscles for respiration. Patient with increased itching all over resulting in skin tear and bleeding. Discussed with wife on reevaluation and some concerns of alcohol use. ABG was obtained which was unremarkable, was initiated on CIWA protocol, UDS obtained and positive for methamphetamine. Today his respiratory status has significantly improved compared to yesterday, however overall mentation still poor. Not requiring medications per CIWA, discontinued scheduled ativan in hopes for improvement in mentation. Unable to follow commands to take morning meds, speech consulted. Review of Systems: 12 system review negative except as stated above. Objective         Vitals Last 24 Hours:  TEMPERATURE:  Temp  Av.2 °F (36.8 °C)  Min: 97.9 °F (36.6 °C)  Max: 98.4 °F (36.9 °C)  RESPIRATIONS RANGE: Resp  Av  Min: 16  Max: 32  PULSE OXIMETRY RANGE: SpO2  Av %  Min: 97 %  Max: 99 %  PULSE RANGE: Pulse  Av.3  Min: 86  Max: 102  BLOOD PRESSURE RANGE: Systolic (07SBO), NJJ:317 , Min:134 , HWH:487   ; Diastolic (81KWR), QTH:25, Min:78, Max:107    I/O (24Hr): Intake/Output Summary (Last 24 hours) at 3/24/2023 1240  Last data filed at 3/24/2023 0935  Gross per 24 hour   Intake 0 ml   Output --   Net 0 ml         Physical Examination:  General: somnolent, no acute distress lying comfortably in bed.   HEENT: Atraumatic normocephalic, range of motion normal, no JVD,
Progress Note      Date:3/26/2023       Room:0425/425-02  Patient Name:Pradip Oivedo     YOB: 1955     Age:68 y.o. Subjective    Subjective: 70year old male with past medical history of COPD uses PRN O2 per NC at home who presented to [facility] ED with complaints of shortness of breath. Chest x-ray indicated left lower lobe pulmonary opacity. CT head unremarkable for acute findings. Patient was admitted and currently being treated for pneumonia as well as COPD exacerbation. On 3/22; patient with increased use of abdominal muscles for respiration. Patient with increased itching all over resulting in skin tear and bleeding. Discussed with wife on reevaluation and some concerns of alcohol use. ABG was obtained which was unremarkable, was initiated on CIWA protocol, UDS obtained and positive for methamphetamine. Today, awake and alert and answering questions appropriately. Re-evaluated by speech yesterday and placed on a diet. Looks the best today that I have seen in days. Denies any concerns at this time. Review of Systems: 12 system review negative except as stated above. Objective         Vitals Last 24 Hours:  TEMPERATURE:  Temp  Av.8 °F (36 °C)  Min: 96.4 °F (35.8 °C)  Max: 97 °F (36.1 °C)  RESPIRATIONS RANGE: Resp  Av  Min: 20  Max: 20  PULSE OXIMETRY RANGE: SpO2  Av.8 %  Min: 92 %  Max: 100 %  PULSE RANGE: Pulse  Av.2  Min: 41  Max: 85  BLOOD PRESSURE RANGE: Systolic (61COP), UMH:205 , Min:128 , ZUR:286   ; Diastolic (67KFL), NPL:72, Min:81, Max:98    I/O (24Hr): Intake/Output Summary (Last 24 hours) at 3/26/2023 0944  Last data filed at 3/25/2023 2009  Gross per 24 hour   Intake 835 ml   Output --   Net 835 ml         Physical Examination:  General: no acute distress, lying comfortably in bed. HEENT: Atraumatic normocephalic, range of motion normal, no JVD, no tracheal deviation noted.  Poor dentition  Cardiac: Normal W5-F2, with systolic
Pt discharged by ambulance to [facility] with all personal belongings including cell phone, glasses, and jewelery. Pt's spouse at bedside. Paperwork faxed and sent with EMS.     Electronically signed by Vinayak Harris RN on 3/28/2023 at 3:00 PM
Report called to Gail Nelson RN at [facility]    Electronically signed by Abisai Escalante RN on 3/28/2023 at 11:01 AM
Objective   Heart Rate: 75  BP: 137/83  MAP (Calculated): 101  Resp: 20  SpO2: 93 %  O2 Device: Nasal cannula  Comment: 2LNC                Bed Mobility Training  Bed Mobility Training: Yes  Supine to Sit: Minimum assistance  Sit to Supine: Moderate assistance  Scooting: Total assistance (up in bed)  Balance  Sitting: Intact  Standing: With support (Stood up briefly bed side could not step.)  Transfer Training  Transfer Training: Yes  Sit to Stand: Maximum assistance  Stand to Sit: Minimum assistance     AROM: Within functional limits  Strength: Within functional limits  ADL  Feeding: Supervision  Grooming: Supervision  UE Bathing: Minimal assistance  LE Bathing: Maximum assistance  UE Dressing: Minimal assistance  LE Dressing: Maximum assistance  Toileting: Dependent/Total        Bed mobility  Supine to Sit: Minimal assistance  Sit to Supine: Minimal assistance  Transfers  Stand Step Transfers: Unable to assess  Sit to stand: Maximum assistance;2 Person assistance  Transfer Comments: Pt. stood about 3/4 erect but unable to step and returned to edge of bed. Cognition  Overall Cognitive Status: [facility]  Cognition Comment: SPEECH SLURRED AT TIMES, APPEARS MOSTLY DUE TO AROUSAL LEVEL  Orientation  Overall Orientation Status: Within Functional Limits                                           G-Code     OutComes Score                                                  AM-PAC Score             Tinneti Score       Goals  Short Term Goals  Time Frame for Short Term Goals: 1 week  Short Term Goal 1: Mod A with BSC tfers  Short Term Goal 2: Tolerate standing 30 seconds at bedside with ModA to assist with self care  Short Term Goal 3: Supervision with donning UB shirt .   Long Term Goals  Long Term Goal 1: Return to PLOF       Therapy Time   Individual Concurrent Group Co-treatment   Time In           Time Out           Minutes                   Dariel Coughlin OT
12:53 PM    EMR Dragon/Transcription disclaimer:   Much of this encounter note is an electronic transcription/translation of spoken language to printed text.  The electronic translation of spoken language may permit erroneous, or at times, nonsensical words or phrases to be inadvertently transcribed; although attempts have made to review the note for such errors, some may still exist.
9: 40 AM    EMR Dragon/Transcription disclaimer:   Much of this encounter note is an electronic transcription/translation of spoken language to printed text.  The electronic translation of spoken language may permit erroneous, or at times, nonsensical words or phrases to be inadvertently transcribed; although attempts have made to review the note for such errors, some may still exist.
achievable. Dictated and Electronically Signed by Fede Kincaid MD at 20-Mar-2023 08:35:37 AM EST               XR CHEST PORTABLE   Final Result     Left lower lobe pulmonary opacity. Dictated and Electronically Signed by Kelvin May MD at 20-Mar-2023 08:18:17 AM EST                   None    Assessment/Plan:    Active Hospital Problems    Diagnosis     Pneumonia [J18.9]      Priority: Medium    Sepsis ([facility] [address] [A41.9]      Priority: Medium    COPD exacerbation ([facility] [address] [J44.1]          COPD very severe disease (FEV1 25)   With acute exacerbation. Plan:     - solumedrol 40q8   - zithro/rocephin   - DuoNeb   - Pulmocort   - Singulair. Possible CAP. Procal elevated. LLL CXR changes - per his pulmonologist he has Hx of chronic scarring in that area  Cont empirc bx  Follow procal.   Follow cultures. DVT Prophylaxis: lovenox. Diet: ADULT DIET; Regular  ADULT ORAL NUTRITION SUPPLEMENT; Lunch, Dinner;  Other Oral Supplement; $ oz frozen milkshake  L-Chocolate,   D-Vanilla  Code Status: Full Code      Dispo - cc      Kate Looney MD
encounter note is an electronic transcription/translation of spoken language to printed text.  The electronic translation of spoken language may permit erroneous, or at times, nonsensical words or phrases to be inadvertently transcribed; although attempts have made to review the note for such errors, some may still exist.
language to printed text.  The electronic translation of spoken language may permit erroneous, or at times, nonsensical words or phrases to be inadvertently transcribed; although attempts have made to review the note for such errors, some may still exist.
thick/nectar thick liquids. Recommend meds in pudding/applesauce. If patient receives mouth care prior to intake, okay for ice chips and small sips regular water IN BETWEEN MEALS for comfort. Will continue to follow.      Electronically signed by LUIS F Boswell on 3/25/2023 at 2:05 PM

## 2023-03-30 ENCOUNTER — TELEPHONE (OUTPATIENT)
Dept: INTERNAL MEDICINE | Age: 68
End: 2023-03-30

## 2023-03-30 NOTE — TELEPHONE ENCOUNTER
[facility] Transitions Initial Follow Up Call    Outreach made within 2 business days of discharge: Yes    Patient: Kenna Burks   Patient : 1955 MRN: [id number]    Reason for Admission: SOB    Discharge Date: 3/28/23      Discharge Diagnoses   Principal Problem:    Pneumonia  Active Problems:    Sepsis ([facility] [address]    COPD exacerbation ([facility] [address]  Resolved Problems:    * No resolved hospital problems     Spoke with: Attempted to make contact with patient/caregiver for an initial transitions of care follow up call post discharge without success. I will reach out again at a later time. Any previously scheduled hospital follow up appointments noted below. Discharge department/facility: [facility] [address] CHART  PT    Scheduled appointment with PCP within 7-14 days    Follow Up  No future appointments.     Ryley Donis MA

## 2023-03-31 ENCOUNTER — TELEPHONE (OUTPATIENT)
Dept: INTERNAL MEDICINE | Age: 68
End: 2023-03-31

## 2023-03-31 NOTE — TELEPHONE ENCOUNTER
SKILLED NURSING FACILITY:   INITIAL CALL POST-HOSPITAL DISCHARGE    SNF: [address]    PHONE NUMBER: [address] / :    THERAPY:    ANTICIPATED LENGTH OF STAY: unknown

## 2023-04-03 ENCOUNTER — HOSPITAL ENCOUNTER (INPATIENT)
Age: 68
LOS: 2 days | Discharge: NURSING, REHAB OR LONG-TERM CARE | DRG: 292 | End: 2023-04-05
Attending: STUDENT IN AN ORGANIZED HEALTH CARE EDUCATION/TRAINING PROGRAM | Admitting: STUDENT IN AN ORGANIZED HEALTH CARE EDUCATION/TRAINING PROGRAM
Payer: MEDICARE

## 2023-04-03 ENCOUNTER — APPOINTMENT (OUTPATIENT)
Dept: GENERAL RADIOLOGY | Age: 68
DRG: 292 | End: 2023-04-03
Payer: MEDICARE

## 2023-04-03 DIAGNOSIS — R06.03 RESPIRATORY DISTRESS: Primary | ICD-10-CM

## 2023-04-03 DIAGNOSIS — E87.70 HYPERVOLEMIA, UNSPECIFIED HYPERVOLEMIA TYPE: ICD-10-CM

## 2023-04-03 PROBLEM — I50.33 ACUTE ON CHRONIC DIASTOLIC (CONGESTIVE) HEART FAILURE (HCC): Status: ACTIVE | Noted: 2023-04-03

## 2023-04-03 PROBLEM — R09.02 HYPOXIA: Status: ACTIVE | Noted: 2023-04-03

## 2023-04-03 LAB
ALBUMIN SERPL-MCNC: 2.6 G/DL (ref 3.5–5.2)
ALLENS TEST: ABNORMAL
ALP SERPL-CCNC: 63 U/L (ref 40–130)
ALT SERPL-CCNC: 31 U/L (ref 5–41)
ANION GAP SERPL CALCULATED.3IONS-SCNC: 10 MMOL/L (ref 7–19)
AST SERPL-CCNC: 17 U/L (ref 5–40)
BASE EXCESS ARTERIAL: 3.2 MMOL/L (ref -2–2)
BASOPHILS # BLD: 0.1 K/UL (ref 0–0.2)
BASOPHILS NFR BLD: 0.4 % (ref 0–1)
BILIRUB SERPL-MCNC: 0.5 MG/DL (ref 0.2–1.2)
BNP BLD-MCNC: ABNORMAL PG/ML (ref 0–900)
BUN SERPL-MCNC: 11 MG/DL (ref 8–23)
CALCIUM SERPL-MCNC: 9.1 MG/DL (ref 8.8–10.2)
CARBOXYHEMOGLOBIN ARTERIAL: 2.5 % (ref 0–5)
CHLORIDE SERPL-SCNC: 104 MMOL/L (ref 98–111)
CO2 SERPL-SCNC: 27 MMOL/L (ref 22–29)
CREAT SERPL-MCNC: 0.6 MG/DL (ref 0.5–1.2)
EOSINOPHIL # BLD: 0.1 K/UL (ref 0–0.6)
EOSINOPHIL NFR BLD: 0.6 % (ref 0–5)
ERYTHROCYTE [DISTWIDTH] IN BLOOD BY AUTOMATED COUNT: 14.8 % (ref 11.5–14.5)
GLUCOSE SERPL-MCNC: 113 MG/DL (ref 74–109)
HCO3 ARTERIAL: 27.9 MMOL/L (ref 22–26)
HCT VFR BLD AUTO: 43.3 % (ref 42–52)
HEMOGLOBIN, ART, EXTENDED: 13.5 G/DL (ref 14–18)
HGB BLD-MCNC: 14 G/DL (ref 14–18)
IMM GRANULOCYTES # BLD: 0.1 K/UL
LACTATE BLDV-SCNC: 1.2 MMOL/L (ref 0.5–1.9)
LYMPHOCYTES # BLD: 1 K/UL (ref 1.1–4.5)
LYMPHOCYTES NFR BLD: 7.9 % (ref 20–40)
MCH RBC QN AUTO: 31.4 PG (ref 27–31)
MCHC RBC AUTO-ENTMCNC: 32.3 G/DL (ref 33–37)
MCV RBC AUTO: 97.1 FL (ref 80–94)
METHEMOGLOBIN ARTERIAL: 0.9 %
MONOCYTES # BLD: 1.5 K/UL (ref 0–0.9)
MONOCYTES NFR BLD: 12.6 % (ref 0–10)
NEUTROPHILS # BLD: 9.4 K/UL (ref 1.5–7.5)
NEUTS SEG NFR BLD: 77.5 % (ref 50–65)
O2 CONTENT ARTERIAL: 17.8 ML/DL
O2 DELIVERY DEVICE: ABNORMAL
O2 SAT, ARTERIAL: 93.7 %
O2 THERAPY: ABNORMAL
OXYGEN FLOW: 2.5
PCO2 ARTERIAL: 42 MMHG (ref 35–45)
PH ARTERIAL: 7.43 (ref 7.35–7.45)
PLATELET # BLD AUTO: 423 K/UL (ref 130–400)
PMV BLD AUTO: 10.8 FL (ref 9.4–12.4)
PO2 ARTERIAL: 70 MMHG (ref 80–100)
POTASSIUM BLD-SCNC: 3.8 MMOL/L
POTASSIUM SERPL-SCNC: 4.3 MMOL/L (ref 3.5–5)
PROT SERPL-MCNC: 6.2 G/DL (ref 6.6–8.7)
RBC # BLD AUTO: 4.46 M/UL (ref 4.7–6.1)
SAMPLE SOURCE: ABNORMAL
SARS-COV-2 RDRP RESP QL NAA+PROBE: NOT DETECTED
SODIUM SERPL-SCNC: 141 MMOL/L (ref 136–145)
T4 FREE SERPL-MCNC: 1.58 NG/DL (ref 0.93–1.7)
TROPONIN T SERPL-MCNC: 0.02 NG/ML (ref 0–0.03)
TSH SERPL DL<=0.005 MIU/L-ACNC: 2.26 UIU/ML (ref 0.27–4.2)
WBC # BLD AUTO: 12.1 K/UL (ref 4.8–10.8)

## 2023-04-03 PROCEDURE — 84484 ASSAY OF TROPONIN QUANT: CPT

## 2023-04-03 PROCEDURE — 2580000003 HC RX 258: Performed by: STUDENT IN AN ORGANIZED HEALTH CARE EDUCATION/TRAINING PROGRAM

## 2023-04-03 PROCEDURE — 82803 BLOOD GASES ANY COMBINATION: CPT

## 2023-04-03 PROCEDURE — 6360000002 HC RX W HCPCS: Performed by: STUDENT IN AN ORGANIZED HEALTH CARE EDUCATION/TRAINING PROGRAM

## 2023-04-03 PROCEDURE — 93005 ELECTROCARDIOGRAM TRACING: CPT | Performed by: PHYSICIAN ASSISTANT

## 2023-04-03 PROCEDURE — 80053 COMPREHEN METABOLIC PANEL: CPT

## 2023-04-03 PROCEDURE — G0378 HOSPITAL OBSERVATION PER HR: HCPCS

## 2023-04-03 PROCEDURE — 84443 ASSAY THYROID STIM HORMONE: CPT

## 2023-04-03 PROCEDURE — 71045 X-RAY EXAM CHEST 1 VIEW: CPT

## 2023-04-03 PROCEDURE — 83880 ASSAY OF NATRIURETIC PEPTIDE: CPT

## 2023-04-03 PROCEDURE — 87040 BLOOD CULTURE FOR BACTERIA: CPT

## 2023-04-03 PROCEDURE — 36415 COLL VENOUS BLD VENIPUNCTURE: CPT

## 2023-04-03 PROCEDURE — 84439 ASSAY OF FREE THYROXINE: CPT

## 2023-04-03 PROCEDURE — 99285 EMERGENCY DEPT VISIT HI MDM: CPT

## 2023-04-03 PROCEDURE — 6370000000 HC RX 637 (ALT 250 FOR IP): Performed by: STUDENT IN AN ORGANIZED HEALTH CARE EDUCATION/TRAINING PROGRAM

## 2023-04-03 PROCEDURE — 36600 WITHDRAWAL OF ARTERIAL BLOOD: CPT

## 2023-04-03 PROCEDURE — 6360000002 HC RX W HCPCS: Performed by: PHYSICIAN ASSISTANT

## 2023-04-03 PROCEDURE — 83605 ASSAY OF LACTIC ACID: CPT

## 2023-04-03 PROCEDURE — 1210000000 HC MED SURG R&B

## 2023-04-03 PROCEDURE — 87635 SARS-COV-2 COVID-19 AMP PRB: CPT

## 2023-04-03 PROCEDURE — 85025 COMPLETE CBC W/AUTO DIFF WBC: CPT

## 2023-04-03 PROCEDURE — 71045 X-RAY EXAM CHEST 1 VIEW: CPT | Performed by: RADIOLOGY

## 2023-04-03 PROCEDURE — 94640 AIRWAY INHALATION TREATMENT: CPT

## 2023-04-03 RX ORDER — GUAIFENESIN 600 MG/1
600 TABLET, EXTENDED RELEASE ORAL DAILY
Status: DISCONTINUED | OUTPATIENT
Start: 2023-04-03 | End: 2023-04-05 | Stop reason: HOSPADM

## 2023-04-03 RX ORDER — POTASSIUM CHLORIDE 7.45 MG/ML
10 INJECTION INTRAVENOUS PRN
Status: DISCONTINUED | OUTPATIENT
Start: 2023-04-03 | End: 2023-04-05 | Stop reason: HOSPADM

## 2023-04-03 RX ORDER — SODIUM CHLORIDE 0.9 % (FLUSH) 0.9 %
5-40 SYRINGE (ML) INJECTION PRN
Status: DISCONTINUED | OUTPATIENT
Start: 2023-04-03 | End: 2023-04-05 | Stop reason: HOSPADM

## 2023-04-03 RX ORDER — SODIUM CHLORIDE 0.9 % (FLUSH) 0.9 %
5-40 SYRINGE (ML) INJECTION EVERY 12 HOURS SCHEDULED
Status: DISCONTINUED | OUTPATIENT
Start: 2023-04-03 | End: 2023-04-05 | Stop reason: HOSPADM

## 2023-04-03 RX ORDER — ENOXAPARIN SODIUM 100 MG/ML
40 INJECTION SUBCUTANEOUS DAILY
Status: DISCONTINUED | OUTPATIENT
Start: 2023-04-03 | End: 2023-04-05 | Stop reason: HOSPADM

## 2023-04-03 RX ORDER — FUROSEMIDE 10 MG/ML
80 INJECTION INTRAMUSCULAR; INTRAVENOUS ONCE
Status: COMPLETED | OUTPATIENT
Start: 2023-04-03 | End: 2023-04-03

## 2023-04-03 RX ORDER — ALBUTEROL SULFATE 2.5 MG/3ML
2.5 SOLUTION RESPIRATORY (INHALATION) 4 TIMES DAILY
Status: DISCONTINUED | OUTPATIENT
Start: 2023-04-03 | End: 2023-04-05

## 2023-04-03 RX ORDER — BUDESONIDE AND FORMOTEROL FUMARATE DIHYDRATE 160; 4.5 UG/1; UG/1
2 AEROSOL RESPIRATORY (INHALATION) 2 TIMES DAILY
Status: DISCONTINUED | OUTPATIENT
Start: 2023-04-03 | End: 2023-04-05 | Stop reason: HOSPADM

## 2023-04-03 RX ORDER — SODIUM CHLORIDE 9 MG/ML
INJECTION, SOLUTION INTRAVENOUS PRN
Status: DISCONTINUED | OUTPATIENT
Start: 2023-04-03 | End: 2023-04-05 | Stop reason: HOSPADM

## 2023-04-03 RX ORDER — METOPROLOL SUCCINATE 25 MG/1
25 TABLET, EXTENDED RELEASE ORAL DAILY
Status: DISCONTINUED | OUTPATIENT
Start: 2023-04-03 | End: 2023-04-05 | Stop reason: HOSPADM

## 2023-04-03 RX ORDER — ONDANSETRON 2 MG/ML
4 INJECTION INTRAMUSCULAR; INTRAVENOUS EVERY 6 HOURS PRN
Status: DISCONTINUED | OUTPATIENT
Start: 2023-04-03 | End: 2023-04-05 | Stop reason: HOSPADM

## 2023-04-03 RX ORDER — ALBUTEROL SULFATE 90 UG/1
2 AEROSOL, METERED RESPIRATORY (INHALATION) EVERY 4 HOURS PRN
Status: DISCONTINUED | OUTPATIENT
Start: 2023-04-03 | End: 2023-04-05 | Stop reason: HOSPADM

## 2023-04-03 RX ORDER — MAGNESIUM SULFATE IN WATER 40 MG/ML
2000 INJECTION, SOLUTION INTRAVENOUS PRN
Status: DISCONTINUED | OUTPATIENT
Start: 2023-04-03 | End: 2023-04-05 | Stop reason: HOSPADM

## 2023-04-03 RX ORDER — POTASSIUM CHLORIDE 20 MEQ/1
40 TABLET, EXTENDED RELEASE ORAL PRN
Status: DISCONTINUED | OUTPATIENT
Start: 2023-04-03 | End: 2023-04-05 | Stop reason: HOSPADM

## 2023-04-03 RX ORDER — PANTOPRAZOLE SODIUM 40 MG/1
40 TABLET, DELAYED RELEASE ORAL
Status: DISCONTINUED | OUTPATIENT
Start: 2023-04-04 | End: 2023-04-05 | Stop reason: HOSPADM

## 2023-04-03 RX ORDER — GAUZE BANDAGE 2" X 2"
100 BANDAGE TOPICAL DAILY
Status: DISCONTINUED | OUTPATIENT
Start: 2023-04-03 | End: 2023-04-05 | Stop reason: HOSPADM

## 2023-04-03 RX ORDER — ACETAMINOPHEN 325 MG/1
650 TABLET ORAL EVERY 6 HOURS PRN
Status: DISCONTINUED | OUTPATIENT
Start: 2023-04-03 | End: 2023-04-05 | Stop reason: HOSPADM

## 2023-04-03 RX ORDER — ONDANSETRON 4 MG/1
4 TABLET, ORALLY DISINTEGRATING ORAL EVERY 8 HOURS PRN
Status: DISCONTINUED | OUTPATIENT
Start: 2023-04-03 | End: 2023-04-05 | Stop reason: HOSPADM

## 2023-04-03 RX ORDER — ACETAMINOPHEN 650 MG/1
650 SUPPOSITORY RECTAL EVERY 6 HOURS PRN
Status: DISCONTINUED | OUTPATIENT
Start: 2023-04-03 | End: 2023-04-05 | Stop reason: HOSPADM

## 2023-04-03 RX ORDER — POLYETHYLENE GLYCOL 3350 17 G/17G
17 POWDER, FOR SOLUTION ORAL DAILY PRN
Status: DISCONTINUED | OUTPATIENT
Start: 2023-04-03 | End: 2023-04-05 | Stop reason: HOSPADM

## 2023-04-03 RX ORDER — FUROSEMIDE 10 MG/ML
40 INJECTION INTRAMUSCULAR; INTRAVENOUS 2 TIMES DAILY
Status: DISCONTINUED | OUTPATIENT
Start: 2023-04-04 | End: 2023-04-05

## 2023-04-03 RX ADMIN — BUDESONIDE AND FORMOTEROL FUMARATE DIHYDRATE 2 PUFF: 160; 4.5 AEROSOL RESPIRATORY (INHALATION) at 19:53

## 2023-04-03 RX ADMIN — ENOXAPARIN SODIUM 40 MG: 100 INJECTION SUBCUTANEOUS at 20:37

## 2023-04-03 RX ADMIN — ALBUTEROL SULFATE 2.5 MG: 2.5 SOLUTION RESPIRATORY (INHALATION) at 19:55

## 2023-04-03 RX ADMIN — SODIUM CHLORIDE, PRESERVATIVE FREE 10 ML: 5 INJECTION INTRAVENOUS at 20:37

## 2023-04-03 RX ADMIN — FUROSEMIDE 80 MG: 10 INJECTION, SOLUTION INTRAMUSCULAR; INTRAVENOUS at 17:17

## 2023-04-03 ASSESSMENT — ENCOUNTER SYMPTOMS
EYE ITCHING: 0
COLOR CHANGE: 0
COUGH: 1
BACK PAIN: 0
EYE DISCHARGE: 0
PHOTOPHOBIA: 0
SHORTNESS OF BREATH: 1
APNEA: 0

## 2023-04-03 NOTE — H&P
fluid collections are present. The posterior fossa is unremarkable. Atherosclerotic calcific plaques in the walls of the carotid siphons. The skull base and calvarium are intact. The included portions of the mastoid air cells are clear. 1.  No acute intracranial bleed, no mass, no hypodense lobar infarct. 2. Ill-defined bandlike hypodensity involving the regions of the anterior limb of the right internal capsule and head of the right caudate nucleus likely from remote ischemic event. 3. Mild to moderate dilatation of the ventricles (right greater than left). Mild periventricular white matter changes. Diffuse prominence of the cortical sulci, sylvian fissures and basilar cisterns likely reflecting cerebral volume loss. Recommendation: Follow up as clinically indicated. All CT scans at this facility utilize dose modulation, iterative reconstruction, and/or weight based dosing when appropriate to reduce radiation dose to as low as reasonably achievable. Dictated and Electronically Signed by Nohemy Ribeiro MD at 20-Mar-2023 08:35:37 AM EST             XR CHEST PORTABLE    Result Date: 4/3/2023  Examination: Chest x-ray, portable Clinical history: Shortness of air Comparison: 3/20/2023 Findings: Left pleural effusion with left basilar atelectasis or infiltrate. Additional scattered airspace disease within the right lung may represent additional infiltrates. Cardiac silhouette is enlarged. Pulmonary vasculature is indistinct. No pneumothorax is seen. No acute osseous lesion is seen. Aortic calcifications are noted. Impression: 1. Cardiomegaly with pulmonary vascular congestion, moderate left pleural     effusion, and left basilar atelectasis or infiltrate. 2.Additional infiltrate within the right midlung. XR CHEST PORTABLE    Result Date: 3/20/2023  NO PRIOR REPORT AVAILABLE Exam: X-RAY OF [facility] Clinical data:Fall, shortness of breath, COPD.  Technique:Single view of the chest. Prior studies: No prior studies

## 2023-04-03 NOTE — ED PROVIDER NOTES
tracheal deviation. Cardiovascular:      Rate and Rhythm: Normal rate and regular rhythm. Heart sounds: Normal heart sounds. No murmur heard. Pulmonary:      Effort: Pulmonary effort is normal.      Breath sounds: Normal breath sounds. No stridor. No wheezing. Chest:      Chest wall: No tenderness. Abdominal:      General: Bowel sounds are normal. There is no distension. Palpations: Abdomen is soft. Tenderness: There is no abdominal tenderness. Musculoskeletal:         General: Normal range of motion. Cervical back: Normal range of motion and neck supple. Skin:     General: Skin is warm and dry. Capillary Refill: Capillary refill takes less than 2 seconds. Neurological:      General: No focal deficit present. Mental Status: He is alert and oriented to person, place, and time. Psychiatric:         Behavior: Behavior normal.         DIAGNOSTIC RESULTS     RADIOLOGY:   Non-plain film images such as CT, Ultrasound and MRI are read by the radiologist. Giuliano Redmond radiographic images are visualized and preliminarilyinterpreted by Yara Morales MD with the below findings:        Interpretation per the Radiologist below, if available at the time of this note:    XR CHEST PORTABLE   Final Result   Impression: 1. Cardiomegaly with pulmonary vascular congestion, moderate left pleural       effusion, and left basilar atelectasis or infiltrate. 2.Additional infiltrate within the right midlung.           LABS:  Labs Reviewed   BRAIN NATRIURETIC PEPTIDE - Abnormal; Notable for the following components:       Result Value    Pro-BNP 25,737 (*)     All other components within normal limits   CBC WITH AUTO DIFFERENTIAL - Abnormal; Notable for the following components:    WBC 12.1 (*)     RBC 4.46 (*)     MCV 97.1 (*)     MCH 31.4 (*)     MCHC 32.3 (*)     RDW 14.8 (*)     Platelets 668 (*)     Neutrophils % 77.5 (*)     Lymphocytes % 7.9 (*)     Monocytes % 12.6 (*)     Neutrophils Absolute

## 2023-04-04 LAB
ANION GAP SERPL CALCULATED.3IONS-SCNC: 10 MMOL/L (ref 7–19)
BASOPHILS # BLD: 0 K/UL (ref 0–0.2)
BASOPHILS NFR BLD: 0.4 % (ref 0–1)
BUN SERPL-MCNC: 12 MG/DL (ref 8–23)
CALCIUM SERPL-MCNC: 8.4 MG/DL (ref 8.8–10.2)
CHLORIDE SERPL-SCNC: 98 MMOL/L (ref 98–111)
CHOLEST SERPL-MCNC: 111 MG/DL (ref 160–199)
CO2 SERPL-SCNC: 31 MMOL/L (ref 22–29)
CREAT SERPL-MCNC: 0.7 MG/DL (ref 0.5–1.2)
EKG P AXIS: 76 DEGREES
EKG P-R INTERVAL: 174 MS
EKG Q-T INTERVAL: 342 MS
EKG QRS DURATION: 84 MS
EKG QTC CALCULATION (BAZETT): 403 MS
EKG T AXIS: 63 DEGREES
EOSINOPHIL # BLD: 0.1 K/UL (ref 0–0.6)
EOSINOPHIL NFR BLD: 0.7 % (ref 0–5)
ERYTHROCYTE [DISTWIDTH] IN BLOOD BY AUTOMATED COUNT: 14.6 % (ref 11.5–14.5)
GLUCOSE SERPL-MCNC: 102 MG/DL (ref 74–109)
HCT VFR BLD AUTO: 36.9 % (ref 42–52)
HDLC SERPL-MCNC: 37 MG/DL (ref 55–121)
HGB BLD-MCNC: 12.2 G/DL (ref 14–18)
IMM GRANULOCYTES # BLD: 0.1 K/UL
LDLC SERPL CALC-MCNC: 59 MG/DL
LV EF: 58 %
LVEF MODALITY: NORMAL
LYMPHOCYTES # BLD: 0.9 K/UL (ref 1.1–4.5)
LYMPHOCYTES NFR BLD: 9.1 % (ref 20–40)
MAGNESIUM SERPL-MCNC: 1.6 MG/DL (ref 1.6–2.4)
MCH RBC QN AUTO: 31.1 PG (ref 27–31)
MCHC RBC AUTO-ENTMCNC: 33.1 G/DL (ref 33–37)
MCV RBC AUTO: 94.1 FL (ref 80–94)
MONOCYTES # BLD: 1.5 K/UL (ref 0–0.9)
MONOCYTES NFR BLD: 14.6 % (ref 0–10)
NEUTROPHILS # BLD: 7.6 K/UL (ref 1.5–7.5)
NEUTS SEG NFR BLD: 74.6 % (ref 50–65)
PLATELET # BLD AUTO: 376 K/UL (ref 130–400)
PMV BLD AUTO: 10.7 FL (ref 9.4–12.4)
POTASSIUM SERPL-SCNC: 3.6 MMOL/L (ref 3.5–5)
RBC # BLD AUTO: 3.92 M/UL (ref 4.7–6.1)
SODIUM SERPL-SCNC: 139 MMOL/L (ref 136–145)
TRIGL SERPL-MCNC: 77 MG/DL (ref 0–149)
WBC # BLD AUTO: 10.2 K/UL (ref 4.8–10.8)

## 2023-04-04 PROCEDURE — C8929 TTE W OR WO FOL WCON,DOPPLER: HCPCS

## 2023-04-04 PROCEDURE — 85025 COMPLETE CBC W/AUTO DIFF WBC: CPT

## 2023-04-04 PROCEDURE — 80061 LIPID PANEL: CPT

## 2023-04-04 PROCEDURE — 1210000000 HC MED SURG R&B

## 2023-04-04 PROCEDURE — 6360000004 HC RX CONTRAST MEDICATION: Performed by: STUDENT IN AN ORGANIZED HEALTH CARE EDUCATION/TRAINING PROGRAM

## 2023-04-04 PROCEDURE — 6370000000 HC RX 637 (ALT 250 FOR IP): Performed by: STUDENT IN AN ORGANIZED HEALTH CARE EDUCATION/TRAINING PROGRAM

## 2023-04-04 PROCEDURE — 83735 ASSAY OF MAGNESIUM: CPT

## 2023-04-04 PROCEDURE — 6360000002 HC RX W HCPCS: Performed by: STUDENT IN AN ORGANIZED HEALTH CARE EDUCATION/TRAINING PROGRAM

## 2023-04-04 PROCEDURE — 93010 ELECTROCARDIOGRAM REPORT: CPT | Performed by: INTERNAL MEDICINE

## 2023-04-04 PROCEDURE — 36415 COLL VENOUS BLD VENIPUNCTURE: CPT

## 2023-04-04 PROCEDURE — 94640 AIRWAY INHALATION TREATMENT: CPT

## 2023-04-04 PROCEDURE — 2580000003 HC RX 258: Performed by: STUDENT IN AN ORGANIZED HEALTH CARE EDUCATION/TRAINING PROGRAM

## 2023-04-04 PROCEDURE — 2700000000 HC OXYGEN THERAPY PER DAY

## 2023-04-04 PROCEDURE — 94760 N-INVAS EAR/PLS OXIMETRY 1: CPT

## 2023-04-04 PROCEDURE — 80048 BASIC METABOLIC PNL TOTAL CA: CPT

## 2023-04-04 RX ADMIN — ALBUTEROL SULFATE 2.5 MG: 2.5 SOLUTION RESPIRATORY (INHALATION) at 16:09

## 2023-04-04 RX ADMIN — SODIUM CHLORIDE, PRESERVATIVE FREE 10 ML: 5 INJECTION INTRAVENOUS at 08:42

## 2023-04-04 RX ADMIN — BUDESONIDE AND FORMOTEROL FUMARATE DIHYDRATE 2 PUFF: 160; 4.5 AEROSOL RESPIRATORY (INHALATION) at 04:30

## 2023-04-04 RX ADMIN — ALBUTEROL SULFATE 2.5 MG: 2.5 SOLUTION RESPIRATORY (INHALATION) at 04:44

## 2023-04-04 RX ADMIN — ALBUTEROL SULFATE 2.5 MG: 2.5 SOLUTION RESPIRATORY (INHALATION) at 21:42

## 2023-04-04 RX ADMIN — GUAIFENESIN 600 MG: 600 TABLET ORAL at 08:36

## 2023-04-04 RX ADMIN — METOPROLOL SUCCINATE 25 MG: 25 TABLET, EXTENDED RELEASE ORAL at 08:36

## 2023-04-04 RX ADMIN — THIAMINE HCL TAB 100 MG 100 MG: 100 TAB at 08:36

## 2023-04-04 RX ADMIN — ALBUTEROL SULFATE 2.5 MG: 2.5 SOLUTION RESPIRATORY (INHALATION) at 10:42

## 2023-04-04 RX ADMIN — Medication 2 PUFF: at 04:35

## 2023-04-04 RX ADMIN — ALBUTEROL SULFATE 2.5 MG: 2.5 SOLUTION RESPIRATORY (INHALATION) at 19:21

## 2023-04-04 RX ADMIN — FUROSEMIDE 40 MG: 10 INJECTION, SOLUTION INTRAMUSCULAR; INTRAVENOUS at 08:36

## 2023-04-04 RX ADMIN — PANTOPRAZOLE SODIUM 40 MG: 40 TABLET, DELAYED RELEASE ORAL at 05:18

## 2023-04-04 RX ADMIN — PERFLUTREN 1.5 ML: 6.52 INJECTION, SUSPENSION INTRAVENOUS at 07:52

## 2023-04-04 RX ADMIN — FUROSEMIDE 40 MG: 10 INJECTION, SOLUTION INTRAMUSCULAR; INTRAVENOUS at 17:50

## 2023-04-04 RX ADMIN — ENOXAPARIN SODIUM 40 MG: 100 INJECTION SUBCUTANEOUS at 08:36

## 2023-04-04 RX ADMIN — SODIUM CHLORIDE, PRESERVATIVE FREE 10 ML: 5 INJECTION INTRAVENOUS at 19:58

## 2023-04-04 ASSESSMENT — ENCOUNTER SYMPTOMS
NAUSEA: 0
COUGH: 0
ABDOMINAL PAIN: 0
WHEEZING: 0

## 2023-04-04 NOTE — PROGRESS NOTES
The patient had stated that he had left his inhaler at [facility] that he had brought from home. I have called [facility] and they have stated that the inhaler that the patient brought with him from home was empty. They also stated that the patient had inhalers in his room and had been using them whenever he felt like it and not when it was ordered. The nurses stated that if there were any inhalers they would have thrown them away when he left the facility.

## 2023-04-04 NOTE — PROGRESS NOTES
4 Eyes Skin Assessment    Christian Vargas is being assessed upon: Admission    I agree that I, Nydia Arredondo, RN, along with JESSICA Mathis (either 2 RN's or 1 LPN and 1 RN) have performed a thorough Head to Toe Skin Assessment on the patient. ALL assessment sites listed below have been assessed. Areas assessed by both nurses:     [x]   Head, Face, and Ears   [x]   Shoulders, Back, and Chest  [x]   Arms, Elbows, and Hands   [x]   Coccyx, Sacrum, and Ischium  [x]   Legs, Feet, and Heels    Does the Patient have Skin Breakdown? Yes, wound(s) noted upon assessment. It is the responsibility of the Primary Nurse to assure that the following documentation, preventions, orders, and consults are complete on the above noted wound(s): Wound LDA initiated. LDA Flowsheet Documentation includes the Radha-wound, Wound Assessment, Measurements, Dressing Treatment, Drainage, and Color.     Sebastien Prevention initiated: Yes  Wound Care Orders initiated: No    WOC nurse consulted for Pressure Injury (Stage 3,4, Unstageable, DTI, NWPT, and Complex wounds) and New or Established Ostomies: No        Primary Nurse eSignature: Artvale Valles RN on 4/3/2023 at 9:12 PM      Co-Signer eSignature: Electronically signed by JESSICA Mathis on 4/3/23 at 9:13 PM CDT

## 2023-04-04 NOTE — PROGRESS NOTES
Daily Progress Note    Date:2023  Patient: Bob Wolfe  : 1955  PVQ:[id number]  CODE:Full Code No additional code details  PCP:No primary care provider on file. Admit Date: 4/3/2023  3:04 PM   LOS: 1 day     Chief Complaint   Patient presents with    Shortness of Breath         Subjective: Jess Carrillo. Dyspnea has significantly improved. Pt no longer tachypneic. He stated that he wanted to remain in the hospital and receive physical therapy until he could walk, I explained that given the length of time that would take, it would not be possible. Hospital Summary: 69 yo M with COPD on 2L who presented to [facility] ED from [facility] SNF due to increased dyspnea and work of breathing. Pt was recently admitted here from 3/20/23-3/28/23 for Pneumonia and COPD exacerbation and discharged to SNF for rehab. Found to be volume overloaded, BNP 25,737, CXR with pulmonary edema. Admitted to hospitalist service. Treated with IV lasix with subsequent improvement. Review of Systems   Constitutional:  Negative for chills and fever. Respiratory:  Negative for cough and wheezing. Cardiovascular:  Negative for chest pain and palpitations. Gastrointestinal:  Negative for abdominal pain and nausea.      Objective:      Vital signs in last 24 hours:  Patient Vitals for the past 24 hrs:   BP Temp Temp src Pulse Resp SpO2 Height Weight   23 1042 -- -- -- -- -- 95 % -- --   23 0835 131/66 97.5 °F (36.4 °C) Temporal 85 16 99 % -- --   23 0411 112/65 98.2 °F (36.8 °C) Temporal 90 16 94 % -- --   23 0015 (!) 95/52 97 °F (36.1 °C) Temporal 96 20 92 % -- --   23 2228 -- -- -- -- -- -- 5' 8\" (1.727 m) 136 lb 3.2 oz (61.8 kg)   23 1857 (!) 155/91 97.7 °F (36.5 °C) Temporal (!) 102 20 96 % -- --   23 1734 (!) 171/88 98.1 °F (36.7 °C) -- (!) 102 25 93 % -- --   23 1634 (!) 157/75 -- -- 95 25 96 % -- --       I/O last 3 completed shifts:  In: -   Out: 1750 [HELEN:[id number]]  I/O

## 2023-04-04 NOTE — PROGRESS NOTES
4 Eyes Skin Assessment    Quinton Miller is being assessed upon: Admission    I agree that I, Richi Palomares RN, along with Addis Rodriguez RN (either 2 RN's or 1 LPN and 1 RN) have performed a thorough Head to Toe Skin Assessment on the patient. ALL assessment sites listed below have been assessed. Areas assessed by both nurses:     [x]   Head, Face, and Ears   [x]   Shoulders, Back, and Chest  [x]   Arms, Elbows, and Hands   [x]   Coccyx, Sacrum, and Ischium  [x]   Legs, Feet, and Heels    Does the Patient have Skin Breakdown?  No    Sebastien Prevention initiated: No  Wound Care Orders initiated: No    WOC nurse consulted for Pressure Injury (Stage 3,4, Unstageable, DTI, NWPT, and Complex wounds) and New or Established Ostomies: No        Primary Nurse eSignature: Richi Palomares RN on 4/3/2023 at 7:01 PM      Co-Signer eSignature: {Esignature:[id number]}

## 2023-04-05 VITALS
HEIGHT: 68 IN | TEMPERATURE: 97.3 F | DIASTOLIC BLOOD PRESSURE: 61 MMHG | SYSTOLIC BLOOD PRESSURE: 110 MMHG | BODY MASS INDEX: 20.64 KG/M2 | HEART RATE: 74 BPM | OXYGEN SATURATION: 100 % | RESPIRATION RATE: 18 BRPM | WEIGHT: 136.2 LBS

## 2023-04-05 PROBLEM — E44.0 MODERATE MALNUTRITION (HCC): Status: ACTIVE | Noted: 2023-04-05

## 2023-04-05 LAB
ANION GAP SERPL CALCULATED.3IONS-SCNC: 9 MMOL/L (ref 7–19)
BASOPHILS # BLD: 0 K/UL (ref 0–0.2)
BASOPHILS NFR BLD: 0.5 % (ref 0–1)
BUN SERPL-MCNC: 13 MG/DL (ref 8–23)
CALCIUM SERPL-MCNC: 8.1 MG/DL (ref 8.8–10.2)
CHLORIDE SERPL-SCNC: 100 MMOL/L (ref 98–111)
CO2 SERPL-SCNC: 29 MMOL/L (ref 22–29)
CREAT SERPL-MCNC: 0.7 MG/DL (ref 0.5–1.2)
D DIMER PPP FEU-MCNC: 0.88 UG/ML FEU (ref 0–0.48)
EOSINOPHIL # BLD: 0.1 K/UL (ref 0–0.6)
EOSINOPHIL NFR BLD: 1 % (ref 0–5)
ERYTHROCYTE [DISTWIDTH] IN BLOOD BY AUTOMATED COUNT: 14.6 % (ref 11.5–14.5)
GLUCOSE SERPL-MCNC: 151 MG/DL (ref 74–109)
HCT VFR BLD AUTO: 33.7 % (ref 42–52)
HGB BLD-MCNC: 11.3 G/DL (ref 14–18)
IMM GRANULOCYTES # BLD: 0.1 K/UL
LYMPHOCYTES # BLD: 1 K/UL (ref 1.1–4.5)
LYMPHOCYTES NFR BLD: 12 % (ref 20–40)
MAGNESIUM SERPL-MCNC: 1.6 MG/DL (ref 1.6–2.4)
MCH RBC QN AUTO: 31.2 PG (ref 27–31)
MCHC RBC AUTO-ENTMCNC: 33.5 G/DL (ref 33–37)
MCV RBC AUTO: 93.1 FL (ref 80–94)
MONOCYTES # BLD: 1.1 K/UL (ref 0–0.9)
MONOCYTES NFR BLD: 13.9 % (ref 0–10)
NEUTROPHILS # BLD: 5.9 K/UL (ref 1.5–7.5)
NEUTS SEG NFR BLD: 72 % (ref 50–65)
PLATELET # BLD AUTO: 362 K/UL (ref 130–400)
PMV BLD AUTO: 10.7 FL (ref 9.4–12.4)
POTASSIUM SERPL-SCNC: 3.5 MMOL/L (ref 3.5–5)
RBC # BLD AUTO: 3.62 M/UL (ref 4.7–6.1)
SARS-COV-2 RDRP RESP QL NAA+PROBE: NOT DETECTED
SODIUM SERPL-SCNC: 138 MMOL/L (ref 136–145)
WBC # BLD AUTO: 8.2 K/UL (ref 4.8–10.8)

## 2023-04-05 PROCEDURE — 6370000000 HC RX 637 (ALT 250 FOR IP): Performed by: STUDENT IN AN ORGANIZED HEALTH CARE EDUCATION/TRAINING PROGRAM

## 2023-04-05 PROCEDURE — 94640 AIRWAY INHALATION TREATMENT: CPT

## 2023-04-05 PROCEDURE — 6360000002 HC RX W HCPCS: Performed by: HOSPITALIST

## 2023-04-05 PROCEDURE — 6360000002 HC RX W HCPCS: Performed by: STUDENT IN AN ORGANIZED HEALTH CARE EDUCATION/TRAINING PROGRAM

## 2023-04-05 PROCEDURE — 2580000003 HC RX 258: Performed by: STUDENT IN AN ORGANIZED HEALTH CARE EDUCATION/TRAINING PROGRAM

## 2023-04-05 PROCEDURE — 94760 N-INVAS EAR/PLS OXIMETRY 1: CPT

## 2023-04-05 PROCEDURE — 80048 BASIC METABOLIC PNL TOTAL CA: CPT

## 2023-04-05 PROCEDURE — 36415 COLL VENOUS BLD VENIPUNCTURE: CPT

## 2023-04-05 PROCEDURE — 2700000000 HC OXYGEN THERAPY PER DAY

## 2023-04-05 PROCEDURE — 83735 ASSAY OF MAGNESIUM: CPT

## 2023-04-05 PROCEDURE — 85379 FIBRIN DEGRADATION QUANT: CPT

## 2023-04-05 PROCEDURE — 85025 COMPLETE CBC W/AUTO DIFF WBC: CPT

## 2023-04-05 PROCEDURE — 87635 SARS-COV-2 COVID-19 AMP PRB: CPT

## 2023-04-05 RX ORDER — ALBUTEROL SULFATE 2.5 MG/3ML
2.5 SOLUTION RESPIRATORY (INHALATION) EVERY 4 HOURS
Status: DISCONTINUED | OUTPATIENT
Start: 2023-04-05 | End: 2023-04-05 | Stop reason: HOSPADM

## 2023-04-05 RX ORDER — FUROSEMIDE 40 MG/1
40 TABLET ORAL DAILY
Status: DISCONTINUED | OUTPATIENT
Start: 2023-04-05 | End: 2023-04-05 | Stop reason: HOSPADM

## 2023-04-05 RX ORDER — FUROSEMIDE 40 MG/1
40 TABLET ORAL DAILY
Qty: 60 TABLET | Refills: 3 | Status: SHIPPED | OUTPATIENT
Start: 2023-04-06

## 2023-04-05 RX ADMIN — ALBUTEROL SULFATE 2.5 MG: 2.5 SOLUTION RESPIRATORY (INHALATION) at 06:42

## 2023-04-05 RX ADMIN — ALBUTEROL SULFATE 2 PUFF: 90 AEROSOL, METERED RESPIRATORY (INHALATION) at 01:42

## 2023-04-05 RX ADMIN — SODIUM CHLORIDE, PRESERVATIVE FREE 10 ML: 5 INJECTION INTRAVENOUS at 08:58

## 2023-04-05 RX ADMIN — ALBUTEROL SULFATE 2.5 MG: 2.5 SOLUTION RESPIRATORY (INHALATION) at 10:20

## 2023-04-05 RX ADMIN — ENOXAPARIN SODIUM 40 MG: 100 INJECTION SUBCUTANEOUS at 08:58

## 2023-04-05 RX ADMIN — ALBUTEROL SULFATE 2.5 MG: 2.5 SOLUTION RESPIRATORY (INHALATION) at 14:05

## 2023-04-05 RX ADMIN — THIAMINE HCL TAB 100 MG 100 MG: 100 TAB at 08:58

## 2023-04-05 RX ADMIN — GUAIFENESIN 600 MG: 600 TABLET ORAL at 08:57

## 2023-04-05 RX ADMIN — FUROSEMIDE 40 MG: 40 TABLET ORAL at 11:29

## 2023-04-05 RX ADMIN — Medication 2 PUFF: at 06:54

## 2023-04-05 RX ADMIN — BUDESONIDE AND FORMOTEROL FUMARATE DIHYDRATE 2 PUFF: 160; 4.5 AEROSOL RESPIRATORY (INHALATION) at 06:53

## 2023-04-05 NOTE — CONSULTS
Nutrition Education    Educated on 2 gm Na+ diet  Learners: Patient  Readiness: Acceptance  Method: Explanation, Demonstration, and Handout  Response: Needs Reinforcement  Contact name and number provided.     April Griggs MS, RD, LD  Contact Number: [phone]

## 2023-04-05 NOTE — PLAN OF CARE
Worker, Psychosocial CNS, Spiritual Care as appropriate  Outcome: Adequate for Discharge     Problem: Skin/Tissue Integrity - Adult  Goal: Skin integrity remains intact  Outcome: Adequate for Discharge  Goal: Incisions, wounds, or drain sites healing without S/S of infection  Outcome: Adequate for Discharge  Goal: Oral mucous membranes remain intact  Outcome: Adequate for Discharge     Problem: Musculoskeletal - Adult  Goal: Return mobility to safest level of function  Outcome: Adequate for Discharge  Goal: Maintain proper alignment of affected body part  Outcome: Adequate for Discharge  Goal: Return ADL status to a safe level of function  Outcome: Adequate for Discharge     Problem: Metabolic/Fluid and Electrolytes - Adult  Goal: Electrolytes maintained within normal limits  Outcome: Adequate for Discharge  Goal: Hemodynamic stability and optimal renal function maintained  Outcome: Adequate for Discharge  Goal: Glucose maintained within prescribed range  Outcome: Adequate for Discharge

## 2023-04-05 NOTE — DISCHARGE INSTR - DIET
Good nutrition is important when healing from an illness, injury, or surgery.  Follow any nutrition recommendations given to you during your hospital stay.   If you were given an oral nutrition supplement while in the hospital, continue to take this supplement at home.  You can take it with meals, in-between meals, and/or before bedtime. These supplements can be purchased at most local grocery stores, pharmacies, and chain [employer]-stores.   If you have any questions about your diet or nutrition, call the hospital and ask for the dietitian.    Regular 2 GM sodium diet

## 2023-04-05 NOTE — DISCHARGE INSTRUCTIONS
patient to follow-up closely with PCP upon discharge for management of chronic medical issues   Daily weights

## 2023-04-05 NOTE — PROGRESS NOTES
Comprehensive Nutrition Assessment    Type and Reason for Visit:  Initial, Consult    Nutrition Recommendations/Plan:   Continue 2 gm Na+ diet     Malnutrition Assessment:  Malnutrition Status: Moderate malnutrition (04/05/23 0944)    Context:  Acute Illness     Findings of the 6 clinical characteristics of malnutrition:       Body Fat Loss:  Mild body fat loss Triceps   Muscle Mass Loss:  Mild muscle mass loss Thigh (quadraceps)     Nutrition Assessment:    Pt is Moderately Malnourished AEB mild fat and muscle loss. Consult received for diet education. Pt seemed angry and initially stated, \"so you're the one who won't let me have pepperoni or sausage\". Pt denies using much salt at home. Reports he does all of the cooking at home and uses mostly herbs for seasoning. We mostly discussed the importance of avoiding processed meats as much as possible, because this seemed to be where pt is more non-compliant. Pt was more receptive to that portion of information given. Pt seemed much calmer by the end of our visit. Current Nutrition Intake & Therapies:    ADULT DIET; Regular;  Low Sodium (2 gm)    Anthropometric Measures:  Height: 5' 8\" (172.7 cm)  Ideal Body Weight (IBW): 154 lbs (70 kg)    Current Body Weight: 136 lb (61.7 kg)  Current BMI (kg/m2): 20.7  BMI Categories: Underweight (BMI less than 22) age over 72     Nutrition Diagnosis:   Food & Nutrition-related knowledge deficit related to cardiac dysfunction as evidenced by other (comment)    Nutrition Interventions:   Food and/or Nutrient Delivery: Continue Current Diet  Nutrition Education/Counseling: Education completed  Coordination of Nutrition Care: Continue to monitor while inpatient    Nutrition Monitoring and Evaluation:   Behavioral-Environmental Outcomes: Readiness for Change    Discharge Planning:    Continue current diet     Chuck Rodrigues MS, RD, LD  Contact: [phone]

## 2023-04-05 NOTE — CONSULTS
Nurse met with patient re: referral to CHF clinic. Discussed with patient diet, activity, medications, definition of heart failure and s/s, daily wt monitoring with reporting of wt gain of >2-3 lbs in 24 hours or > 5 lbs in one week, BP/HR and activity monitoring with use of daily log, f/u appointments with PCP, CHF clinic and cardiologist. Discussed vaccine use, testing, monitoring of BS. HF packet given. Patient stated he does have a wt scale and BP monitor at home. He is not established with a Cardiologist and they were not Consulted this visit. Therefore he is not eligible for CHF clinic. From conversation with him it is doubtful that he would attend as he has been telling me all the reasons he does not go to see doctors. I have spent time trying to reason with him about why it is important and have focused on issues just at this visit alone. He does not have a PCP and it does not appear he will get one. He did talk about limited life span with COPD. I asked him if he had ever been evaluated by Hospice to see if he is appropriate for there services. He does not think so. After some discussion he said he would be open to that discussion. Notified the 5th floor care mgr Tereasa Severin so she could address possibility. Time spent with patient 30 minutes.

## 2023-04-05 NOTE — CARE COORDINATION
04/04/23 0701   Readmission Assessment   Number of Days since last admission? 1-7 days   Previous Disposition SNF   Who is being Interviewed Patient   What was the patient's/caregiver's perception as to why they think they needed to return back to the hospital? Other (Comment)  (could not breath, coughing)   Did you visit your Primary Care Physician after you left the hospital, before you returned this time? No  (appointment at later date)   Why weren't you able to visit your PCP? Other (Comment)  (appointment at later date)   Did you see a specialist, such as Cardiac, Pulmonary, Orthopedic Physician, etc. after you left the hospital? No   Who advised the patient to return to the hospital? Skilled Unit   Does the patient report anything that got in the way of taking their medications? No   In our efforts to provide the best possible care to you and others like you, can you think of anything that we could have done to help you after you left the hospital the first time, so that you might not have needed to return so soon?  Other (Comment)  (needs O2 at SNF)
Patient has bed offer from [facility]. Patient can discharge when medically stable. Patient will need an Updated Negative Covid Swab prior to discharge.     [facility]  [phone] Fax for admissions  Electronically signed by Uday Vincent RN, BSN on 4/5/2023 at 9:04 AM
Patient has requested to be listed with [facility], [place] and [facility]. He states that he is not going back to [facility]. Referrals sent.  Awaiting acceptance/denial.    [facility] (formerly [address] [place] [address])   [phone] P  [phone] F  [phone] Referral fax number for     [facility]   I   F    [facility]  [phone] Fax for admissions  Electronically signed by Simon Chong RN, BSN on 4/4/2023 at 10:23 AM
[facility], [facility] has offered.  Miguel Ocampo for acceptance/denial.  Electronically signed by Lawyer Willian RN, BSN on 4/4/2023 at 1:33 PM
Yes  Other Identified Issues/Barriers to RETURNING to current housing: respiratory distress  Potential Assistance needed at discharge: Sampson Vicente            Potential DME:    Patient expects to discharge to: Aaliyah Rodriguez [address] for transportation at discharge: Family    Financial    Payor: [address] [place] [address],[address] / Plan: MEDICARE PART A AND B / Product Type: *No Product type* /     Does insurance require precert for SNF: No    Potential assistance Purchasing Medications: No  Meds-to-Beds request:        [facility] [address], [address] [address] [place] [phone] Jeffery [place] [phone]  [address]  Phone: [phone] Fax: [phone]    [facility], [address] [place] [id number]  Phone: [phone] Fax: [phone]      Notes:    Factors facilitating achievement of predicted outcomes: Cooperative    Barriers to discharge: Limited family support, Limited safety awareness, Decreased endurance, and Lower extremity weakness    Additional Case Management Notes:   Patient recently discharged from here on 03/28/2023 and went to [facility] SNF. Patient had increased shortness of breath and returned to hospital. Will continue to follow. Patient from [facility] and he can return when medically stable. Patient will need O2 at facility. [place]   [address] [facility] for Transition of Care is related to the following treatment goals of Respiratory distress [R06.03]  Hypervolemia, unspecified hypervolemia type [E87.70]  Acute on chronic diastolic (congestive) heart failure (HCC) [J79.29]    IF APPLICABLE: The Patient and/or patient representative Eddie Arevalo and his family were provided with a choice of provider and agrees with the discharge plan.  Freedom of choice list with basic dialogue that supports the patient's individualized plan of

## 2023-04-05 NOTE — DISCHARGE SUMMARY
future appointments. Discharge Instructions:   Please see the discharge paperwork. Patient was seen at bedside today, and the examination shows improvement since yesterday. Diet Instructions:  ADULT DIET; Regular; Low Sodium (2 gm)     Detailed discharge directions delivered to the patient by myself and our nursing staff, who verbalizes understanding and is satisfied with the plan. Patient has been advised to continue all medications as prescribed and advised, and f/u with PCP within 1 week. Patient is stable from medical standpoint to be discharged. Total time spent during patient evaluation and assessment, discussion with the nurse/family, addressing discharge medications/scripts and coordination of care for safe discharge was 34 minutes.       Signed Electronically:    Harry Perez MD  11:42 AM 4/5/2023

## 2023-04-08 LAB
BACTERIA BLD CULT ORG #2: NORMAL
BACTERIA BLD CULT: NORMAL

## 2023-04-13 ENCOUNTER — LAB REQUISITION (OUTPATIENT)
Dept: LAB | Facility: HOSPITAL | Age: 68
End: 2023-04-13
Payer: MEDICARE

## 2023-04-13 LAB
ALBUMIN SERPL-MCNC: 2.9 G/DL (ref 3.5–5.2)
ALBUMIN SERPL-MCNC: 2.9 G/DL (ref 3.5–5.2)
ALBUMIN/GLOB SERPL: 0.8 G/DL
ALP SERPL-CCNC: 71 U/L (ref 39–117)
ALP SERPL-CCNC: 71 U/L (ref 39–117)
ALT SERPL W P-5'-P-CCNC: 8 U/L (ref 1–41)
ALT SERPL W P-5'-P-CCNC: 8 U/L (ref 1–41)
ANION GAP SERPL CALCULATED.3IONS-SCNC: 12 MMOL/L (ref 5–15)
AST SERPL-CCNC: 14 U/L (ref 1–40)
AST SERPL-CCNC: 14 U/L (ref 1–40)
BASOPHILS # BLD AUTO: 0.05 10*3/MM3 (ref 0–0.2)
BASOPHILS NFR BLD AUTO: 0.8 % (ref 0–1.5)
BILIRUB CONJ SERPL-MCNC: <0.2 MG/DL (ref 0–0.3)
BILIRUB INDIRECT SERPL-MCNC: ABNORMAL MG/DL
BILIRUB SERPL-MCNC: 0.3 MG/DL (ref 0–1.2)
BILIRUB SERPL-MCNC: 0.3 MG/DL (ref 0–1.2)
BUN SERPL-MCNC: 22 MG/DL (ref 8–23)
BUN/CREAT SERPL: 27.5 (ref 7–25)
CALCIUM SPEC-SCNC: 8.8 MG/DL (ref 8.6–10.5)
CHLORIDE SERPL-SCNC: 100 MMOL/L (ref 98–107)
CHOLEST SERPL-MCNC: 130 MG/DL (ref 0–200)
CO2 SERPL-SCNC: 29 MMOL/L (ref 22–29)
CREAT SERPL-MCNC: 0.8 MG/DL (ref 0.76–1.27)
DEPRECATED RDW RBC AUTO: 48.2 FL (ref 37–54)
EGFRCR SERPLBLD CKD-EPI 2021: 95.8 ML/MIN/1.73
EOSINOPHIL # BLD AUTO: 0.28 10*3/MM3 (ref 0–0.4)
EOSINOPHIL NFR BLD AUTO: 4.5 % (ref 0.3–6.2)
ERYTHROCYTE [DISTWIDTH] IN BLOOD BY AUTOMATED COUNT: 14 % (ref 12.3–15.4)
GLOBULIN UR ELPH-MCNC: 3.5 GM/DL
GLUCOSE SERPL-MCNC: 102 MG/DL (ref 65–99)
HBA1C MFR BLD: 5.5 % (ref 4.8–5.6)
HCT VFR BLD AUTO: 37.6 % (ref 37.5–51)
HDLC SERPL-MCNC: 39 MG/DL (ref 40–60)
HGB BLD-MCNC: 11.7 G/DL (ref 13–17.7)
IMM GRANULOCYTES # BLD AUTO: 0.04 10*3/MM3 (ref 0–0.05)
IMM GRANULOCYTES NFR BLD AUTO: 0.6 % (ref 0–0.5)
LDLC SERPL CALC-MCNC: 75 MG/DL (ref 0–100)
LDLC/HDLC SERPL: 1.92 {RATIO}
LYMPHOCYTES # BLD AUTO: 1.34 10*3/MM3 (ref 0.7–3.1)
LYMPHOCYTES NFR BLD AUTO: 21.4 % (ref 19.6–45.3)
MCH RBC QN AUTO: 29.5 PG (ref 26.6–33)
MCHC RBC AUTO-ENTMCNC: 31.1 G/DL (ref 31.5–35.7)
MCV RBC AUTO: 94.9 FL (ref 79–97)
MONOCYTES # BLD AUTO: 0.43 10*3/MM3 (ref 0.1–0.9)
MONOCYTES NFR BLD AUTO: 6.9 % (ref 5–12)
NEUTROPHILS NFR BLD AUTO: 4.11 10*3/MM3 (ref 1.7–7)
NEUTROPHILS NFR BLD AUTO: 65.8 % (ref 42.7–76)
NRBC BLD AUTO-RTO: 0 /100 WBC (ref 0–0.2)
PLATELET # BLD AUTO: 287 10*3/MM3 (ref 140–450)
PMV BLD AUTO: 10.2 FL (ref 6–12)
POTASSIUM SERPL-SCNC: 3.1 MMOL/L (ref 3.5–5.2)
PROT SERPL-MCNC: 6.4 G/DL (ref 6–8.5)
PROT SERPL-MCNC: 6.4 G/DL (ref 6–8.5)
RBC # BLD AUTO: 3.96 10*6/MM3 (ref 4.14–5.8)
SODIUM SERPL-SCNC: 141 MMOL/L (ref 136–145)
T4 FREE SERPL-MCNC: 1.18 NG/DL (ref 0.93–1.7)
TRIGL SERPL-MCNC: 81 MG/DL (ref 0–150)
TSH SERPL DL<=0.05 MIU/L-ACNC: 1.54 UIU/ML (ref 0.27–4.2)
VLDLC SERPL-MCNC: 16 MG/DL (ref 5–40)
WBC NRBC COR # BLD: 6.25 10*3/MM3 (ref 3.4–10.8)

## 2023-04-13 PROCEDURE — 80053 COMPREHEN METABOLIC PANEL: CPT | Performed by: NURSE PRACTITIONER

## 2023-04-13 PROCEDURE — 84439 ASSAY OF FREE THYROXINE: CPT | Performed by: NURSE PRACTITIONER

## 2023-04-13 PROCEDURE — 80061 LIPID PANEL: CPT | Performed by: NURSE PRACTITIONER

## 2023-04-13 PROCEDURE — 85025 COMPLETE CBC W/AUTO DIFF WBC: CPT | Performed by: NURSE PRACTITIONER

## 2023-04-13 PROCEDURE — 84443 ASSAY THYROID STIM HORMONE: CPT | Performed by: NURSE PRACTITIONER

## 2023-04-13 PROCEDURE — 83036 HEMOGLOBIN GLYCOSYLATED A1C: CPT | Performed by: NURSE PRACTITIONER

## 2023-04-13 PROCEDURE — 82248 BILIRUBIN DIRECT: CPT | Performed by: NURSE PRACTITIONER

## 2023-05-17 DIAGNOSIS — J44.9 STAGE 4 VERY SEVERE COPD BY GOLD CLASSIFICATION: ICD-10-CM

## 2023-05-17 RX ORDER — ALBUTEROL SULFATE 90 UG/1
2 AEROSOL, METERED RESPIRATORY (INHALATION) EVERY 4 HOURS PRN
Qty: 18 G | Refills: 1 | Status: SHIPPED | OUTPATIENT
Start: 2023-05-17

## 2023-05-17 NOTE — TELEPHONE ENCOUNTER
Received a fax from [facility] requesting refills of albuterol HFA.    Rx Refill Note  Requested Prescriptions     Pending Prescriptions Disp Refills    albuterol sulfate  (90 Base) MCG/ACT inhaler 18 g 1     Sig: Inhale 2 puffs Every 4 (Four) Hours As Needed for Wheezing. Patient prefers the ventolin inhaler.      Last office visit with prescribing clinician: 10/26/2021   Last telemedicine visit with prescribing clinician: 2/2/2023   Next office visit with prescribing clinician: 6/13/2023                         Would you like a call back once the refill request has been completed: [] Yes [] No    If the office needs to give you a call back, can they leave a voicemail: [] Yes [] No    Renetta Gamez MA  05/17/23, 16:44 CDT

## 2023-05-22 PROBLEM — I50.32 CHRONIC HEART FAILURE WITH PRESERVED EJECTION FRACTION (HFPEF): Status: ACTIVE | Noted: 2023-05-22

## 2023-05-22 PROBLEM — J96.11 CHRONIC RESPIRATORY FAILURE WITH HYPOXIA: Chronic | Status: ACTIVE | Noted: 2023-05-22

## 2023-05-22 PROBLEM — J96.11 CHRONIC RESPIRATORY FAILURE WITH HYPOXIA: Status: ACTIVE | Noted: 2023-05-22

## 2023-05-22 PROBLEM — I50.32 CHRONIC HEART FAILURE WITH PRESERVED EJECTION FRACTION (HFPEF): Chronic | Status: ACTIVE | Noted: 2023-05-22

## 2023-05-22 NOTE — PROGRESS NOTES
"Chief Complaint  COPD and Shortness of Breath    Subjective    History of Present Illness {CC  Problem List  Visit Diagnosis   Encounters  Notes  Medications  Labs  Result Review Imaging  Media: 23}    Fran Dimas presents to [facility] for:    History of Present Illness  Management of his COPD and lung scarring.  Scarring is present in the left lower lobe on imaging going back to 2012.  Most recent FEV1 in 2018 was 25.  He is a former smoker.  Offered low-dose lung cancer imaging in the past which he declined.  Offered flu shot which he declined.  He has not been here since October 2021.  At that time he was benefiting from Trelegy or Breztri.  He also likes Symbicort but has trouble getting this covered by his insurance.  He would like a refill on Breztri today.  He would also like a refill on his rescue inhaler.  He has a nebulizer he can use when needed.  He has not been needing it more recently.  When I saw him in 2021 he was not on oxygen at that time.    He was hospitalized at [facility] in March 2023 with left lower lobe pneumonia, EtOH withdrawal and methamphetamine use.  He was noted to be on 2 L of oxygen at that time.  He was discharged to [facility] for rehab.  He returned back to [facility] in April 2023 for admission related to CHF.  BNP was over 25,000.  Echo showing EF normal with some diastolic dysfunction.  RVSP normal at 26.  He responded well to IV diuresis.  He was discharged home on 40 mg of Lasix daily and a 2 g sodium restriction daily.    Today he indicates he has not been using the oxygen since returning home from rehab.  He is also not been taking the Lasix every day.  \"I cannot remember it sometimes\".  He denies having any edema or shortness of breath worse than his normal.  He does not have a scale at home so is uncertain of what his weight has been running.  He does not restrict his sodium is recommended.  He does not " "follow cardiology     Prior to Admission medications    Medication Sig Start Date End Date Taking? Authorizing Provider   albuterol sulfate  (90 Base) MCG/ACT inhaler Inhale 2 puffs Every 4 (Four) Hours As Needed for Wheezing. Patient prefers the ventolin inhaler. 5/17/23   Kristie Naqvi APRN   Budeson-Glycopyrrol-Formoterol (BREZTRI) 160-9-4.8 MCG/ACT aerosol inhaler Inhale 2 puffs 2 (Two) Times a Day. 2/3/23   Kristie Naqvi APRN       Social History     Socioeconomic History    Marital status:    Tobacco Use    Smoking status: Former     Types: Cigars    Smokeless tobacco: Never    Tobacco comments:     quit a long time ago; 5 -6 cigars a day   Substance and Sexual Activity    Alcohol use: Yes     Comment: daily    Drug use: No    Sexual activity: Defer       Objective   Vital Signs:   /76   Pulse 96   Ht 172.7 cm (68\")   Wt 56.8 kg (125 lb 3.2 oz)   SpO2 96% Comment: RA  BMI 19.04 kg/m²     Physical Exam  Eyes:      Comments: Glasses   Cardiovascular:      Rate and Rhythm: Normal rate and regular rhythm.      Heart sounds: No murmur heard.  Pulmonary:      Effort: Pulmonary effort is normal. Tachypnea present. No accessory muscle usage, prolonged expiration or respiratory distress.      Breath sounds: Normal breath sounds.      Comments: Minimal, fine rales  Musculoskeletal:      Right lower leg: No edema.      Left lower leg: No edema.   Neurological:      Mental Status: He is alert and oriented to person, place, and time.      Result Review :      My interpretation of the PFT : none    No results found for this or any previous visit.    XR CHEST PORTABLE (04/03/2023 16:26 EDT)     My interpretation of imaging: Breo megaly, pulmonary vascular congestion, left pleural effusion with atelectasis, right middle lobe atelectasis    BNP (IN-HOUSE) (04/03/2023 16:17 EDT)     My interpretation of labs: BNP elevated at greater than 25,000      Assessment and Plan {CC Problem List  Visit " Diagnosis  ROS  Review (Popup)  [facility]  Quality  BestPractice  Medications  SmartSets  SnapShot Encounters  Media : 23}    Diagnoses and all orders for this visit:    1. Stage 4 very severe COPD by GOLD classification (Primary)  Comments:  Refill sent in for Breztri and albuterol inhaler.  Okay to use nebulizer when needed.  PFTs with follow-up  Orders:  -     albuterol sulfate  (90 Base) MCG/ACT inhaler; Inhale 2 puffs Every 4 (Four) Hours As Needed for Wheezing. Patient prefers the ventolin inhaler.  Dispense: 18 g; Refill: 1  -     Budeson-Glycopyrrol-Formoterol (BREZTRI) 160-9-4.8 MCG/ACT aerosol inhaler; Inhale 2 puffs 2 (Two) Times a Day.  Dispense: 10.7 each; Refill: 11    2. Scarring of lung  Comments:  Present for many years.  Contributes to his shortness of breath.  Stable on imaging.  PFTs with follow-up    3. Personal history of nicotine dependence  Comments:  Continue to avoid smoking.  He is not interested in low-dose lung cancer screening    4. Chronic respiratory failure with hypoxia  Comments:  He has not required the oxygen since discharge from skilled nursing facility    5. Chronic heart failure with preserved ejection fraction (HFpEF)  Comments:  Noncompliant with daily weighing, 2 g sodium restriction and Lasix 40 mg daily.  Does not appear to be having any issues currently. Consider cardiology referral      Body mass index is 19.04 kg/m².    Kristie Naqvi, REYES  6/13/2023  13:14 CDT    Follow Up   Return in about 6 months (around 12/13/2023) for FVL with diffusion.    Patient was given instructions and counseling regarding his condition or for health maintenance advice. Please see specific information pulled into the AVS if appropriate.

## 2023-06-13 ENCOUNTER — OFFICE VISIT (OUTPATIENT)
Dept: PULMONOLOGY | Facility: CLINIC | Age: 68
End: 2023-06-13
Payer: MEDICARE

## 2023-06-13 VITALS
HEART RATE: 96 BPM | WEIGHT: 125.2 LBS | OXYGEN SATURATION: 96 % | HEIGHT: 68 IN | SYSTOLIC BLOOD PRESSURE: 128 MMHG | BODY MASS INDEX: 18.97 KG/M2 | DIASTOLIC BLOOD PRESSURE: 76 MMHG

## 2023-06-13 DIAGNOSIS — Z87.891 PERSONAL HISTORY OF NICOTINE DEPENDENCE: Chronic | ICD-10-CM

## 2023-06-13 DIAGNOSIS — I50.32 CHRONIC HEART FAILURE WITH PRESERVED EJECTION FRACTION (HFPEF): Chronic | ICD-10-CM

## 2023-06-13 DIAGNOSIS — J96.11 CHRONIC RESPIRATORY FAILURE WITH HYPOXIA: Chronic | ICD-10-CM

## 2023-06-13 DIAGNOSIS — J44.9 STAGE 4 VERY SEVERE COPD BY GOLD CLASSIFICATION: Primary | Chronic | ICD-10-CM

## 2023-06-13 DIAGNOSIS — J98.4 SCARRING OF LUNG: Chronic | ICD-10-CM

## 2023-06-13 RX ORDER — GUAIFENESIN 600 MG/1
1 TABLET, EXTENDED RELEASE ORAL DAILY
COMMUNITY

## 2023-06-13 RX ORDER — ALBUTEROL SULFATE 90 UG/1
2 AEROSOL, METERED RESPIRATORY (INHALATION) EVERY 4 HOURS PRN
Qty: 18 G | Refills: 1 | Status: SHIPPED | OUTPATIENT
Start: 2023-06-13

## 2023-06-13 RX ORDER — FUROSEMIDE 40 MG/1
40 TABLET ORAL DAILY
COMMUNITY

## 2023-09-12 DIAGNOSIS — J44.9 STAGE 4 VERY SEVERE COPD BY GOLD CLASSIFICATION: Chronic | ICD-10-CM

## 2023-09-12 RX ORDER — ALBUTEROL SULFATE 90 UG/1
2 AEROSOL, METERED RESPIRATORY (INHALATION) EVERY 4 HOURS PRN
Qty: 18 G | Refills: 1 | Status: SHIPPED | OUTPATIENT
Start: 2023-09-12

## 2023-09-12 NOTE — TELEPHONE ENCOUNTER
Received a fax from [facility] requesting refills on albuterol HFA.    Rx Refill Note  Requested Prescriptions     Pending Prescriptions Disp Refills    albuterol sulfate  (90 Base) MCG/ACT inhaler 18 g 1     Sig: Inhale 2 puffs Every 4 (Four) Hours As Needed for Wheezing. Patient prefers the ventolin inhaler.      Last office visit with prescribing clinician: 6/13/2023   Last telemedicine visit with prescribing clinician: Visit date not found   Next office visit with prescribing clinician: 12/14/2023                         Would you like a call back once the refill request has been completed: [] Yes [] No    If the office needs to give you a call back, can they leave a voicemail: [] Yes [] No    Renetta Gamez MA  09/12/23, 13:31 CDT

## 2023-10-30 DIAGNOSIS — J44.9 STAGE 4 VERY SEVERE COPD BY GOLD CLASSIFICATION: Chronic | ICD-10-CM

## 2023-10-30 RX ORDER — ALBUTEROL SULFATE 90 UG/1
2 AEROSOL, METERED RESPIRATORY (INHALATION) EVERY 4 HOURS PRN
Qty: 18 G | Refills: 1 | Status: SHIPPED | OUTPATIENT
Start: 2023-10-30

## 2023-10-30 NOTE — TELEPHONE ENCOUNTER
Marie sent a request for refills on albuterol HFA.    Per protocol there is no cosign required, script sent to the pharmacy.    Rx Refill Note  Requested Prescriptions     Pending Prescriptions Disp Refills    albuterol sulfate  (90 Base) MCG/ACT inhaler 18 g 1     Sig: Inhale 2 puffs Every 4 (Four) Hours As Needed for Wheezing. Patient prefers the ventolin inhaler.      Last office visit with prescribing clinician: 6/13/2023   Last telemedicine visit with prescribing clinician: Visit date not found   Next office visit with prescribing clinician: 12/14/2023                         Would you like a call back once the refill request has been completed: [] Yes [] No    If the office needs to give you a call back, can they leave a voicemail: [] Yes [] No    Renetta Gamez MA  10/30/23, 15:22 CDT

## 2023-11-14 PROBLEM — J96.11 CHRONIC RESPIRATORY FAILURE WITH HYPOXIA: Chronic | Status: RESOLVED | Noted: 2023-05-22 | Resolved: 2023-11-14

## 2023-12-14 NOTE — PROGRESS NOTES
Chief Complaint  COPD    Subjective    History of Present Illness {  Problem List  Visit Diagnosis   Encounters  Notes  Medications  Labs  Result Review Imaging  Media: 23}    Fran Dimas presents to [facility] for:    History of Present Illness  Management of his COPD and lung scarring.  Scarring is present in the left lower lobe on imaging going back to 2012.  Most recent FEV1 in 2018 was 25.  He is a former smoker.  Offered low-dose lung cancer imaging in the past which he declined.  Offered flu shot which he declined.       He has daily shortness of breath.  He is on Breztri routinely.  He is also benefited from Trelegy and Symbicort in the past.  He has a nebulizer and rescue but seldom uses it.  He has lost his inhaler.  He would like a sample if we have it available.  He would also like refills on his Bronkaid.  He has oxygen available at home to use.  He seldom uses it.         His underlying diastolic heart failure with normal ejection fraction.  They recommended 40 mg of Lasix daily and a 2 g sodium restriction.  He is compliant with the Lasix but not always compliant with the diet.  He has not kept follow-up.            Prior to Admission medications    Medication Sig Start Date End Date Taking? Authorizing Provider   albuterol sulfate  (90 Base) MCG/ACT inhaler Inhale 2 puffs Every 4 (Four) Hours As Needed for Wheezing. Patient prefers the ventolin inhaler. 10/30/23   Kristie Naqvi APRN   Budeson-Glycopyrrol-Formoterol (BREZTRI) 160-9-4.8 MCG/ACT aerosol inhaler Inhale 2 puffs 2 (Two) Times a Day. 6/13/23   Kristie Naqvi APRN   furosemide (LASIX) 40 MG tablet Take 1 tablet by mouth Daily.    ProviderMarisabel MD   guaiFENesin (MUCINEX) 600 MG 12 hr tablet Take 1 tablet by mouth Daily.    ProviderMarisabel MD       Social History     Socioeconomic History    Marital status:    Tobacco Use    Smoking status:  "Former     Types: Cigars     Passive exposure: Past    Smokeless tobacco: Never    Tobacco comments:     quit a long time ago; 5 -6 cigars a day   Vaping Use    Vaping Use: Never used   Substance and Sexual Activity    Alcohol use: Yes     Comment: daily    Drug use: No    Sexual activity: Defer       Objective   Vital Signs:   /82   Pulse 90   Ht 162.6 cm (64\")   Wt 55 kg (121 lb 3.2 oz)   SpO2 92% Comment: RA  BMI 20.80 kg/m²     Physical Exam  Constitutional:       Appearance: He is cachectic.   Eyes:      Comments: Glasses   Cardiovascular:      Rate and Rhythm: Normal rate and regular rhythm.      Heart sounds: No murmur heard.  Pulmonary:      Effort: Pulmonary effort is normal.      Breath sounds: Normal breath sounds.   Musculoskeletal:      Right lower leg: No edema.      Left lower leg: No edema.   Neurological:      Mental Status: He is alert and oriented to person, place, and time.        Result Review :      My interpretation of the PFT : none    No results found for this or any previous visit.      My interpretation of imaging:  none    My interpretation of labs: none      Assessment and Plan {CC Problem List  Visit Diagnosis  ROS  Review (Popup)  [facility] Maintenance  Quality  BestPractice  Medications  SmartSets  SnapShot Encounters  Media : 23}    Diagnoses and all orders for this visit:    1. Stage 4 very severe COPD by GOLD classification (Primary)  Comments:  Continue Breztri.  Samples provided.  [facility] sent to pharmacy.  Unable to perform PFTs due to insufficient force  Orders:  -     Budeson-Glycopyrrol-Formoterol (BREZTRI) 160-9-4.8 MCG/ACT aerosol inhaler; Inhale 2 puffs 2 (Two) Times a Day.  Dispense: 2 each; Refill: 0    2. Scarring of lung  Comments:  Contributes to shortness of breath.  Does not want ongoing PFTs and imaging    3. Personal history of nicotine dependence  Comments:  Continue to avoid smoking    4. Chronic heart failure with preserved ejection " fraction (HFpEF)  Comments:  Contributes to shortness of breath.  Continue beta-blocker and diuretic.  Keep follow-up with cardiology    5. COPD with acute exacerbation  Comments:  Bronkaid sent to pharmacy  Orders:  -     ePHEDrine Sulfate (Bronkaid Max) 25 MG tablet; Take 1 tablet by mouth Every 4 (Four) Hours As Needed (cough/congestion) for up to 7 days.  Dispense: 42 tablet; Refill: 5        Body mass index is 20.8 kg/m².    Kristie Naqvi, APRN  1/11/2024  15:47 CST    Follow Up   Return in about 6 months (around 7/11/2024).    Patient was given instructions and counseling regarding his condition or for health maintenance advice. Please see specific information pulled into the AVS if appropriate.

## 2024-01-02 DIAGNOSIS — J44.9 STAGE 4 VERY SEVERE COPD BY GOLD CLASSIFICATION: Chronic | ICD-10-CM

## 2024-01-02 RX ORDER — ALBUTEROL SULFATE 90 UG/1
2 AEROSOL, METERED RESPIRATORY (INHALATION) EVERY 4 HOURS PRN
Qty: 18 G | Refills: 2 | Status: SHIPPED | OUTPATIENT
Start: 2024-01-02

## 2024-01-02 NOTE — TELEPHONE ENCOUNTER
Received a request from the pharmacy for refills on albuterol hfa.    Per protocol no cosign is required, script sent to the pharmacy.    Rx Refill Note  Requested Prescriptions     Pending Prescriptions Disp Refills    albuterol sulfate  (90 Base) MCG/ACT inhaler 18 g 2     Sig: Inhale 2 puffs Every 4 (Four) Hours As Needed for Wheezing. Patient prefers the ventolin inhaler.      Last office visit with prescribing clinician: 6/13/2023   Last telemedicine visit with prescribing clinician: Visit date not found   Next office visit with prescribing clinician: 1/11/2024                         Would you like a call back once the refill request has been completed: [] Yes [] No    If the office needs to give you a call back, can they leave a voicemail: [] Yes [] No    Renetta Gamez MA  01/02/24, 10:48 CST

## 2024-01-11 ENCOUNTER — PROCEDURE VISIT (OUTPATIENT)
Dept: PULMONOLOGY | Facility: CLINIC | Age: 69
End: 2024-01-11
Payer: MEDICARE

## 2024-01-11 ENCOUNTER — OFFICE VISIT (OUTPATIENT)
Dept: PULMONOLOGY | Facility: CLINIC | Age: 69
End: 2024-01-11
Payer: MEDICARE

## 2024-01-11 VITALS
DIASTOLIC BLOOD PRESSURE: 82 MMHG | SYSTOLIC BLOOD PRESSURE: 128 MMHG | HEIGHT: 64 IN | WEIGHT: 121.2 LBS | HEART RATE: 90 BPM | OXYGEN SATURATION: 92 % | BODY MASS INDEX: 20.69 KG/M2

## 2024-01-11 DIAGNOSIS — I50.32 CHRONIC HEART FAILURE WITH PRESERVED EJECTION FRACTION (HFPEF): Chronic | ICD-10-CM

## 2024-01-11 DIAGNOSIS — J44.9 STAGE 4 VERY SEVERE COPD BY GOLD CLASSIFICATION: Primary | Chronic | ICD-10-CM

## 2024-01-11 DIAGNOSIS — J44.9 STAGE 4 VERY SEVERE COPD BY GOLD CLASSIFICATION: Primary | ICD-10-CM

## 2024-01-11 DIAGNOSIS — J98.4 SCARRING OF LUNG: Chronic | ICD-10-CM

## 2024-01-11 DIAGNOSIS — Z87.891 PERSONAL HISTORY OF NICOTINE DEPENDENCE: Chronic | ICD-10-CM

## 2024-01-11 DIAGNOSIS — J44.1 COPD WITH ACUTE EXACERBATION: ICD-10-CM

## 2024-01-11 PROCEDURE — 3079F DIAST BP 80-89 MM HG: CPT | Performed by: NURSE PRACTITIONER

## 2024-01-11 PROCEDURE — 3074F SYST BP LT 130 MM HG: CPT | Performed by: NURSE PRACTITIONER

## 2024-01-11 PROCEDURE — 1160F RVW MEDS BY RX/DR IN RCRD: CPT | Performed by: NURSE PRACTITIONER

## 2024-01-11 PROCEDURE — 1159F MED LIST DOCD IN RCRD: CPT | Performed by: NURSE PRACTITIONER

## 2024-01-11 PROCEDURE — 99214 OFFICE O/P EST MOD 30 MIN: CPT | Performed by: NURSE PRACTITIONER

## 2024-01-11 RX ORDER — EPHEDRINE SULFATE 25 MG/1
1 TABLET, COATED ORAL EVERY 4 HOURS PRN
Qty: 42 TABLET | Refills: 5 | Status: SHIPPED | OUTPATIENT
Start: 2024-01-11 | End: 2024-01-18

## 2024-01-11 RX ORDER — METOPROLOL SUCCINATE 25 MG/1
25 TABLET, EXTENDED RELEASE ORAL DAILY
COMMUNITY
Start: 2023-12-21

## 2024-01-11 NOTE — PROGRESS NOTES
PFT not performed due to patient's inability to complete maneuver due to unable to do a complete exhale of FVL.  Attempted x 2.  Patient aware no results will be available.

## 2024-01-30 DIAGNOSIS — J44.9 STAGE 4 VERY SEVERE COPD BY GOLD CLASSIFICATION: Chronic | ICD-10-CM

## 2024-01-30 RX ORDER — ALBUTEROL SULFATE 2.5 MG/3ML
2.5 SOLUTION RESPIRATORY (INHALATION) 4 TIMES DAILY PRN
Qty: 120 ML | Refills: 5 | Status: SHIPPED | OUTPATIENT
Start: 2024-01-30 | End: 2024-02-29

## 2024-02-01 NOTE — TELEPHONE ENCOUNTER
"Wife left a voicemail stating the patient's breathing is \"really bad\". He is scheduled for a cataract surgery on 2/15/24 and was told they may not do it because his breathing is bad.      I spoke with the patient and he did say he was having a \"bad breathing day\". He states his shortness of breath is worse that usual.  He is wheezing. He has a non productive cough. No fever or chills.   He is on Breztri, Ventolin and Bronkaid. He states he does not have a nebulizer.    Please advise.   "
Called patient to see where he would like to have the nebulizer prescription sent . He said he would call back.  
I will prescribe him a nebulizer too. Please find out where they want to go to get it or arrange for it to be delivered.    
Message relayed and patient voiced understanding. He will find out where to send the nebulizer and call back.  
Croup

## 2024-02-14 ENCOUNTER — APPOINTMENT (OUTPATIENT)
Dept: GENERAL RADIOLOGY | Age: 69
DRG: 190 | End: 2024-02-14
Payer: MEDICARE

## 2024-02-14 ENCOUNTER — HOSPITAL ENCOUNTER (INPATIENT)
Age: 69
LOS: 11 days | DRG: 190 | End: 2024-02-25
Attending: EMERGENCY MEDICINE | Admitting: INTERNAL MEDICINE
Payer: MEDICARE

## 2024-02-14 DIAGNOSIS — J44.1 COPD EXACERBATION (HCC): Primary | ICD-10-CM

## 2024-02-14 LAB
ALBUMIN SERPL-MCNC: 3.3 G/DL (ref 3.5–5.2)
ALLENS TEST: ABNORMAL
ALLENS TEST: ABNORMAL
ALP SERPL-CCNC: 106 U/L (ref 40–130)
ALT SERPL-CCNC: 353 U/L (ref 5–41)
ANION GAP SERPL CALCULATED.3IONS-SCNC: 14 MMOL/L (ref 7–19)
AST SERPL-CCNC: 404 U/L (ref 5–40)
B PARAP IS1001 DNA NPH QL NAA+NON-PROBE: NOT DETECTED
B PERT.PT PRMT NPH QL NAA+NON-PROBE: NOT DETECTED
BASE EXCESS ARTERIAL: -2 MMOL/L (ref -2–2)
BASE EXCESS ARTERIAL: -4.9 MMOL/L (ref -2–2)
BASOPHILS # BLD: 0 K/UL (ref 0–0.2)
BASOPHILS NFR BLD: 0.1 % (ref 0–1)
BILIRUB SERPL-MCNC: 1 MG/DL (ref 0.2–1.2)
BNP BLD-MCNC: ABNORMAL PG/ML (ref 0–124)
BUN SERPL-MCNC: 38 MG/DL (ref 8–23)
C PNEUM DNA NPH QL NAA+NON-PROBE: NOT DETECTED
CALCIUM SERPL-MCNC: 9.5 MG/DL (ref 8.8–10.2)
CARBOXYHEMOGLOBIN ARTERIAL: 1.9 % (ref 0–5)
CARBOXYHEMOGLOBIN ARTERIAL: 2 % (ref 0–5)
CHLORIDE SERPL-SCNC: 105 MMOL/L (ref 98–111)
CO2 SERPL-SCNC: 21 MMOL/L (ref 22–29)
CREAT SERPL-MCNC: 0.8 MG/DL (ref 0.5–1.2)
D DIMER PPP FEU-MCNC: 1.98 UG/ML FEU (ref 0–0.48)
EOSINOPHIL # BLD: 0 K/UL (ref 0–0.6)
EOSINOPHIL NFR BLD: 0.2 % (ref 0–5)
ERYTHROCYTE [DISTWIDTH] IN BLOOD BY AUTOMATED COUNT: 16 % (ref 11.5–14.5)
ETHANOLAMINE SERPL-MCNC: <10 MG/DL (ref 0–0.08)
FLUAV RNA NPH QL NAA+NON-PROBE: NOT DETECTED
FLUBV RNA NPH QL NAA+NON-PROBE: NOT DETECTED
GLUCOSE SERPL-MCNC: 97 MG/DL (ref 74–109)
HADV DNA NPH QL NAA+NON-PROBE: NOT DETECTED
HCO3 ARTERIAL: 18.8 MMOL/L (ref 22–26)
HCO3 ARTERIAL: 22.3 MMOL/L (ref 22–26)
HCOV 229E RNA NPH QL NAA+NON-PROBE: NOT DETECTED
HCOV HKU1 RNA NPH QL NAA+NON-PROBE: NOT DETECTED
HCOV NL63 RNA NPH QL NAA+NON-PROBE: NOT DETECTED
HCOV OC43 RNA NPH QL NAA+NON-PROBE: NOT DETECTED
HCT VFR BLD AUTO: 48.8 % (ref 42–52)
HEMOGLOBIN, ART, EXTENDED: 15.4 G/DL (ref 14–18)
HEMOGLOBIN, ART, EXTENDED: 16.3 G/DL (ref 14–18)
HGB BLD-MCNC: 15.3 G/DL (ref 14–18)
HMPV RNA NPH QL NAA+NON-PROBE: NOT DETECTED
HPIV1 RNA NPH QL NAA+NON-PROBE: NOT DETECTED
HPIV2 RNA NPH QL NAA+NON-PROBE: NOT DETECTED
HPIV3 RNA NPH QL NAA+NON-PROBE: NOT DETECTED
HPIV4 RNA NPH QL NAA+NON-PROBE: NOT DETECTED
IMM GRANULOCYTES # BLD: 0.1 K/UL
LACTATE BLDV-SCNC: 1.5 MMOL/L (ref 0.5–1.9)
LYMPHOCYTES # BLD: 1.1 K/UL (ref 1.1–4.5)
LYMPHOCYTES NFR BLD: 12.7 % (ref 20–40)
M PNEUMO DNA NPH QL NAA+NON-PROBE: NOT DETECTED
MCH RBC QN AUTO: 29.8 PG (ref 27–31)
MCHC RBC AUTO-ENTMCNC: 31.4 G/DL (ref 33–37)
MCV RBC AUTO: 95.1 FL (ref 80–94)
METHEMOGLOBIN ARTERIAL: 1.1 %
METHEMOGLOBIN ARTERIAL: 1.2 %
MONOCYTES # BLD: 1 K/UL (ref 0–0.9)
MONOCYTES NFR BLD: 11.4 % (ref 0–10)
NEUTROPHILS # BLD: 6.4 K/UL (ref 1.5–7.5)
NEUTS SEG NFR BLD: 75 % (ref 50–65)
O2 CONTENT ARTERIAL: 21 ML/DL
O2 CONTENT ARTERIAL: 22.1 ML/DL
O2 DELIVERY DEVICE: ABNORMAL
O2 DELIVERY DEVICE: ABNORMAL
O2 SAT, ARTERIAL: 96.1 %
O2 SAT, ARTERIAL: 96.2 %
O2 THERAPY: ABNORMAL
O2 THERAPY: ABNORMAL
OXYGEN FLOW: 2
OXYGEN FLOW: 2
PCO2 ARTERIAL: 31 MMHG (ref 35–45)
PCO2 ARTERIAL: 36 MMHG (ref 35–45)
PH ARTERIAL: 7.39 (ref 7.35–7.45)
PH ARTERIAL: 7.4 (ref 7.35–7.45)
PLATELET # BLD AUTO: 298 K/UL (ref 130–400)
PMV BLD AUTO: 11.6 FL (ref 9.4–12.4)
PO2 ARTERIAL: 116 MMHG (ref 80–100)
PO2 ARTERIAL: 131 MMHG (ref 80–100)
POTASSIUM BLD-SCNC: 4.1 MMOL/L
POTASSIUM BLD-SCNC: 5 MMOL/L
POTASSIUM SERPL-SCNC: 4.1 MMOL/L (ref 3.5–5)
PROT SERPL-MCNC: 6.3 G/DL (ref 6.6–8.7)
RBC # BLD AUTO: 5.13 M/UL (ref 4.7–6.1)
REASON FOR REJECTION: NORMAL
REJECTED TEST: NORMAL
RSV RNA NPH QL NAA+NON-PROBE: NOT DETECTED
RV+EV RNA NPH QL NAA+NON-PROBE: NOT DETECTED
SAMPLE SOURCE: ABNORMAL
SAMPLE SOURCE: ABNORMAL
SARS-COV-2 RNA NPH QL NAA+NON-PROBE: NOT DETECTED
SODIUM SERPL-SCNC: 140 MMOL/L (ref 136–145)
SPONT RATE(BPM): 24
SPONT RATE(BPM): 28
TROPONIN, HIGH SENSITIVITY: 44 NG/L (ref 0–22)
WBC # BLD AUTO: 8.5 K/UL (ref 4.8–10.8)

## 2024-02-14 PROCEDURE — 85379 FIBRIN DEGRADATION QUANT: CPT

## 2024-02-14 PROCEDURE — 2580000003 HC RX 258: Performed by: NURSE PRACTITIONER

## 2024-02-14 PROCEDURE — 85025 COMPLETE CBC W/AUTO DIFF WBC: CPT

## 2024-02-14 PROCEDURE — 1210000000 HC MED SURG R&B

## 2024-02-14 PROCEDURE — 6360000002 HC RX W HCPCS: Performed by: NURSE PRACTITIONER

## 2024-02-14 PROCEDURE — 87040 BLOOD CULTURE FOR BACTERIA: CPT

## 2024-02-14 PROCEDURE — 84484 ASSAY OF TROPONIN QUANT: CPT

## 2024-02-14 PROCEDURE — 6370000000 HC RX 637 (ALT 250 FOR IP): Performed by: EMERGENCY MEDICINE

## 2024-02-14 PROCEDURE — 99285 EMERGENCY DEPT VISIT HI MDM: CPT

## 2024-02-14 PROCEDURE — 2700000000 HC OXYGEN THERAPY PER DAY

## 2024-02-14 PROCEDURE — 83605 ASSAY OF LACTIC ACID: CPT

## 2024-02-14 PROCEDURE — 36600 WITHDRAWAL OF ARTERIAL BLOOD: CPT

## 2024-02-14 PROCEDURE — 2580000003 HC RX 258: Performed by: EMERGENCY MEDICINE

## 2024-02-14 PROCEDURE — 0202U NFCT DS 22 TRGT SARS-COV-2: CPT

## 2024-02-14 PROCEDURE — 71045 X-RAY EXAM CHEST 1 VIEW: CPT

## 2024-02-14 PROCEDURE — 94760 N-INVAS EAR/PLS OXIMETRY 1: CPT

## 2024-02-14 PROCEDURE — 94640 AIRWAY INHALATION TREATMENT: CPT

## 2024-02-14 PROCEDURE — 36415 COLL VENOUS BLD VENIPUNCTURE: CPT

## 2024-02-14 PROCEDURE — 80053 COMPREHEN METABOLIC PANEL: CPT

## 2024-02-14 PROCEDURE — 82803 BLOOD GASES ANY COMBINATION: CPT

## 2024-02-14 PROCEDURE — 94150 VITAL CAPACITY TEST: CPT

## 2024-02-14 PROCEDURE — 96365 THER/PROPH/DIAG IV INF INIT: CPT

## 2024-02-14 PROCEDURE — 96375 TX/PRO/DX INJ NEW DRUG ADDON: CPT

## 2024-02-14 PROCEDURE — 83880 ASSAY OF NATRIURETIC PEPTIDE: CPT

## 2024-02-14 PROCEDURE — 82077 ASSAY SPEC XCP UR&BREATH IA: CPT

## 2024-02-14 PROCEDURE — 6370000000 HC RX 637 (ALT 250 FOR IP): Performed by: NURSE PRACTITIONER

## 2024-02-14 PROCEDURE — 6360000002 HC RX W HCPCS: Performed by: EMERGENCY MEDICINE

## 2024-02-14 RX ORDER — IPRATROPIUM BROMIDE AND ALBUTEROL SULFATE 2.5; .5 MG/3ML; MG/3ML
1 SOLUTION RESPIRATORY (INHALATION)
Status: DISCONTINUED | OUTPATIENT
Start: 2024-02-14 | End: 2024-02-21

## 2024-02-14 RX ORDER — GUAIFENESIN 600 MG/1
600 TABLET, EXTENDED RELEASE ORAL 2 TIMES DAILY
Status: DISCONTINUED | OUTPATIENT
Start: 2024-02-14 | End: 2024-02-15

## 2024-02-14 RX ORDER — SODIUM CHLORIDE 9 MG/ML
INJECTION, SOLUTION INTRAVENOUS PRN
Status: DISCONTINUED | OUTPATIENT
Start: 2024-02-14 | End: 2024-02-25 | Stop reason: HOSPADM

## 2024-02-14 RX ORDER — ENOXAPARIN SODIUM 100 MG/ML
40 INJECTION SUBCUTANEOUS EVERY 24 HOURS
Status: DISCONTINUED | OUTPATIENT
Start: 2024-02-14 | End: 2024-02-16

## 2024-02-14 RX ORDER — IPRATROPIUM BROMIDE AND ALBUTEROL SULFATE 2.5; .5 MG/3ML; MG/3ML
1 SOLUTION RESPIRATORY (INHALATION) ONCE
Status: COMPLETED | OUTPATIENT
Start: 2024-02-14 | End: 2024-02-14

## 2024-02-14 RX ORDER — ONDANSETRON 4 MG/1
4 TABLET, ORALLY DISINTEGRATING ORAL EVERY 8 HOURS PRN
Status: DISCONTINUED | OUTPATIENT
Start: 2024-02-14 | End: 2024-02-25 | Stop reason: HOSPADM

## 2024-02-14 RX ORDER — ALBUTEROL SULFATE 90 UG/1
2 AEROSOL, METERED RESPIRATORY (INHALATION) ONCE
Status: DISCONTINUED | OUTPATIENT
Start: 2024-02-14 | End: 2024-02-21

## 2024-02-14 RX ORDER — POLYETHYLENE GLYCOL 3350 17 G/17G
17 POWDER, FOR SOLUTION ORAL DAILY PRN
Status: DISCONTINUED | OUTPATIENT
Start: 2024-02-14 | End: 2024-02-25 | Stop reason: HOSPADM

## 2024-02-14 RX ORDER — SODIUM CHLORIDE 0.9 % (FLUSH) 0.9 %
5-40 SYRINGE (ML) INJECTION EVERY 12 HOURS SCHEDULED
Status: DISCONTINUED | OUTPATIENT
Start: 2024-02-14 | End: 2024-02-25 | Stop reason: HOSPADM

## 2024-02-14 RX ORDER — BUDESONIDE 0.5 MG/2ML
0.5 INHALANT ORAL
Status: DISCONTINUED | OUTPATIENT
Start: 2024-02-14 | End: 2024-02-25 | Stop reason: HOSPADM

## 2024-02-14 RX ORDER — ACETAMINOPHEN 650 MG/1
650 SUPPOSITORY RECTAL EVERY 6 HOURS PRN
Status: DISCONTINUED | OUTPATIENT
Start: 2024-02-14 | End: 2024-02-18

## 2024-02-14 RX ORDER — SODIUM CHLORIDE 0.9 % (FLUSH) 0.9 %
5-40 SYRINGE (ML) INJECTION PRN
Status: DISCONTINUED | OUTPATIENT
Start: 2024-02-14 | End: 2024-02-25 | Stop reason: HOSPADM

## 2024-02-14 RX ORDER — ONDANSETRON 2 MG/ML
4 INJECTION INTRAMUSCULAR; INTRAVENOUS EVERY 6 HOURS PRN
Status: DISCONTINUED | OUTPATIENT
Start: 2024-02-14 | End: 2024-02-25 | Stop reason: HOSPADM

## 2024-02-14 RX ORDER — ACETAMINOPHEN 325 MG/1
650 TABLET ORAL EVERY 6 HOURS PRN
Status: DISCONTINUED | OUTPATIENT
Start: 2024-02-14 | End: 2024-02-18

## 2024-02-14 RX ADMIN — AZITHROMYCIN MONOHYDRATE 500 MG: 500 INJECTION, POWDER, LYOPHILIZED, FOR SOLUTION INTRAVENOUS at 15:17

## 2024-02-14 RX ADMIN — IPRATROPIUM BROMIDE AND ALBUTEROL SULFATE 1 DOSE: .5; 2.5 SOLUTION RESPIRATORY (INHALATION) at 13:37

## 2024-02-14 RX ADMIN — IPRATROPIUM BROMIDE AND ALBUTEROL SULFATE 1 DOSE: 2.5; .5 SOLUTION RESPIRATORY (INHALATION) at 16:10

## 2024-02-14 RX ADMIN — GUAIFENESIN 600 MG: 600 TABLET ORAL at 21:37

## 2024-02-14 RX ADMIN — SODIUM CHLORIDE, PRESERVATIVE FREE 10 ML: 5 INJECTION INTRAVENOUS at 21:38

## 2024-02-14 RX ADMIN — WATER 1000 MG: 1 INJECTION INTRAMUSCULAR; INTRAVENOUS; SUBCUTANEOUS at 15:12

## 2024-02-14 RX ADMIN — WATER 125 MG: 1 INJECTION INTRAMUSCULAR; INTRAVENOUS; SUBCUTANEOUS at 14:56

## 2024-02-14 RX ADMIN — ENOXAPARIN SODIUM 40 MG: 100 INJECTION SUBCUTANEOUS at 21:37

## 2024-02-14 RX ADMIN — WATER 40 MG: 1 INJECTION INTRAMUSCULAR; INTRAVENOUS; SUBCUTANEOUS at 21:38

## 2024-02-14 RX ADMIN — BUDESONIDE INHALATION 500 MCG: 0.5 SUSPENSION RESPIRATORY (INHALATION) at 19:54

## 2024-02-14 RX ADMIN — IPRATROPIUM BROMIDE AND ALBUTEROL SULFATE 1 DOSE: 2.5; .5 SOLUTION RESPIRATORY (INHALATION) at 19:54

## 2024-02-14 ASSESSMENT — PAIN - FUNCTIONAL ASSESSMENT: PAIN_FUNCTIONAL_ASSESSMENT: NONE - DENIES PAIN

## 2024-02-14 NOTE — CARE COORDINATION
Zenaida met with pt at bedside. Pt is out of Bronkaid and is unable to get more Bronkaid due to hitting the max amount for the month and won't be able to get more. Pt also hasn't been able to figure out the Nebulizer. Zenaida recommended pt going to a [employer] company to show them how to use the Neb.

## 2024-02-14 NOTE — H&P
[facility]      Hospitalist - History & Physical      PCP: No primary care provider on file.    Date of Admission: 2/14/2024    Date of Service: 2/14/2024    Chief Complaint:  Shortness of breath     History Of Present Illness:   The patient is a 69 y.o. male who presented to [facility] ED for evaluation of shortness of breath. Pt has history of copd, chronic diastolic heart failure and hypoxia.    Pt is a vague historian relating that he has had increasing shortness of breath over past 2 weeks. He relates that he wasn't able to go to bathroom today due to worsening dyspnea when walking short distance at home. He reports that he hasn't been able to afford some of his respiratory inhalers. Pt reportedly has supplemental oxygen at home however doesn't use this routinely.     In ED, bnp 25,845-bnp 25,737 on 4/3/2023, sodium 140, potassium 4.1, creatinine 0.8/bun 38, lactic acid 1.5, resp pcr panel negative, cxr-Cardiomegaly and atherosclerosis.  There is a small left pleural effusion with mild adjacent density suggesting atelectasis and / or pneumonia.  Lungs are otherwise grossly clear.  No noticeable vascular congestion, interstitial edema or pneumothorax. ABGs pH 7.4, pCO2 36, pO2 22, wbc 8.5k,hgb 15.3, platelets 298k. Pt is admitted inpatient to hospitalist.    Past Medical History:        Diagnosis Date    COPD (chronic obstructive pulmonary disease) (HCC)        Past Surgical History:    History reviewed. No pertinent surgical history.    Home Medications:  Prior to Admission medications    Medication Sig Start Date End Date Taking? Authorizing Provider   furosemide (LASIX) 40 MG tablet Take 1 tablet by mouth daily 4/6/23   Glenn Valles MD   metoprolol succinate (TOPROL XL) 25 MG extended release tablet Take 1 tablet by mouth daily 3/29/23   Lexus Wilson MD   pantoprazole (PROTONIX) 40 MG tablet Take 1 tablet by mouth every morning (before breakfast) 3/29/23   Lexus Wilson MD   thiamine

## 2024-02-14 NOTE — ED PROVIDER NOTES
[facility] EMERGENCY DEPT  eMERGENCY dEPARTMENT eNCOUnter      Pt Name: Pradip Oviedo  MRN: [id number]  Birthdate 1955  Date of evaluation: 2/14/2024  Provider: PRIMO NUNEZ MD    CHIEF COMPLAINT       Chief Complaint   Patient presents with    Shortness of Breath     EMS called for SOB due to needing duo-neb. NRB at this time.         HISTORY OF PRESENT ILLNESS   (Location/Symptom, Timing/Onset,Context/Setting, Quality, Duration, Modifying Factors, Severity)  Note limiting factors.   Pradip Oviedo is a 69 y.o. male who presents to the emergency department for shortness of breath.  Patient has known COPD and supposed to wear oxygen but does not.  He no longer smokes.  Admits to a several week history of increased dyspnea.  He does not know how to use his nebulizer machine and tells me that he is out of his inhalers and unable to get any refills because they are not due yet.  Was found to be hypoxic at 80% when EMS arrived but reported was lower than this and transported here on nonrebreather and given 1 DuoNeb and route with some relief.  He denies any chest pain.  Does admit to cough.  No fevers or chills.    The history is provided by the patient and the EMS personnel.       NursingNotes were reviewed.    REVIEW OF SYSTEMS    (2-9 systems for level 4, 10 or more for level 5)     Review of Systems   Constitutional:  Negative for chills and fever.   HENT:  Negative for rhinorrhea and sore throat.    Respiratory:  Positive for cough and shortness of breath.    Cardiovascular:  Negative for chest pain and leg swelling.   Gastrointestinal:  Negative for abdominal pain, diarrhea, nausea and vomiting.   Genitourinary:  Negative for dysuria and frequency.   Musculoskeletal:  Negative for back pain and neck pain.   Neurological:  Negative for dizziness and headaches.   All other systems reviewed and are negative.           PAST MEDICALHISTORY     Past Medical History:   Diagnosis Date    COPD (chronic obstructive pulmonary

## 2024-02-15 ENCOUNTER — APPOINTMENT (OUTPATIENT)
Dept: CT IMAGING | Age: 69
DRG: 190 | End: 2024-02-15
Payer: MEDICARE

## 2024-02-15 PROBLEM — R41.841 COGNITIVE COMMUNICATION DEFICIT: Status: ACTIVE | Noted: 2023-03-28

## 2024-02-15 PROBLEM — R13.12 DYSPHAGIA, OROPHARYNGEAL PHASE: Status: ACTIVE | Noted: 2023-03-28

## 2024-02-15 PROBLEM — I50.32 CHRONIC HEART FAILURE WITH PRESERVED EJECTION FRACTION (HFPEF) (HCC): Chronic | Status: ACTIVE | Noted: 2023-05-22

## 2024-02-15 PROBLEM — Z51.5 PALLIATIVE CARE PATIENT: Status: ACTIVE | Noted: 2024-02-15

## 2024-02-15 LAB
ANION GAP SERPL CALCULATED.3IONS-SCNC: 18 MMOL/L (ref 7–19)
BUN SERPL-MCNC: 44 MG/DL (ref 8–23)
CALCIUM SERPL-MCNC: 9.5 MG/DL (ref 8.8–10.2)
CHLORIDE SERPL-SCNC: 105 MMOL/L (ref 98–111)
CO2 SERPL-SCNC: 17 MMOL/L (ref 22–29)
CREAT SERPL-MCNC: 1.1 MG/DL (ref 0.5–1.2)
ERYTHROCYTE [DISTWIDTH] IN BLOOD BY AUTOMATED COUNT: 16.3 % (ref 11.5–14.5)
GLUCOSE SERPL-MCNC: 112 MG/DL (ref 74–109)
HCT VFR BLD AUTO: 50.1 % (ref 42–52)
HGB BLD-MCNC: 15.8 G/DL (ref 14–18)
MCH RBC QN AUTO: 30.7 PG (ref 27–31)
MCHC RBC AUTO-ENTMCNC: 31.5 G/DL (ref 33–37)
MCV RBC AUTO: 97.5 FL (ref 80–94)
PLATELET # BLD AUTO: 196 K/UL (ref 130–400)
PMV BLD AUTO: 11.2 FL (ref 9.4–12.4)
POTASSIUM SERPL-SCNC: 4.8 MMOL/L (ref 3.5–5)
RBC # BLD AUTO: 5.14 M/UL (ref 4.7–6.1)
SODIUM SERPL-SCNC: 140 MMOL/L (ref 136–145)
TROPONIN, HIGH SENSITIVITY: 35 NG/L (ref 0–22)
WBC # BLD AUTO: 4.8 K/UL (ref 4.8–10.8)

## 2024-02-15 PROCEDURE — 71275 CT ANGIOGRAPHY CHEST: CPT

## 2024-02-15 PROCEDURE — 84484 ASSAY OF TROPONIN QUANT: CPT

## 2024-02-15 PROCEDURE — 99222 1ST HOSP IP/OBS MODERATE 55: CPT

## 2024-02-15 PROCEDURE — 94760 N-INVAS EAR/PLS OXIMETRY 1: CPT

## 2024-02-15 PROCEDURE — 36415 COLL VENOUS BLD VENIPUNCTURE: CPT

## 2024-02-15 PROCEDURE — 6360000002 HC RX W HCPCS: Performed by: NURSE PRACTITIONER

## 2024-02-15 PROCEDURE — 6370000000 HC RX 637 (ALT 250 FOR IP): Performed by: NURSE PRACTITIONER

## 2024-02-15 PROCEDURE — 6360000004 HC RX CONTRAST MEDICATION: Performed by: NURSE PRACTITIONER

## 2024-02-15 PROCEDURE — 2700000000 HC OXYGEN THERAPY PER DAY

## 2024-02-15 PROCEDURE — 94640 AIRWAY INHALATION TREATMENT: CPT

## 2024-02-15 PROCEDURE — 2580000003 HC RX 258: Performed by: NURSE PRACTITIONER

## 2024-02-15 PROCEDURE — 85027 COMPLETE CBC AUTOMATED: CPT

## 2024-02-15 PROCEDURE — 80048 BASIC METABOLIC PNL TOTAL CA: CPT

## 2024-02-15 PROCEDURE — 1210000000 HC MED SURG R&B

## 2024-02-15 PROCEDURE — 6370000000 HC RX 637 (ALT 250 FOR IP)

## 2024-02-15 RX ORDER — BUDESONIDE, GLYCOPYRROLATE, AND FORMOTEROL FUMARATE 160; 9; 4.8 UG/1; UG/1; UG/1
2 AEROSOL, METERED RESPIRATORY (INHALATION) 2 TIMES DAILY
Qty: 10.7 G | Refills: 0 | Status: SHIPPED | OUTPATIENT
Start: 2024-02-15 | End: 2024-02-26

## 2024-02-15 RX ORDER — GUAIFENESIN 600 MG/1
600 TABLET, EXTENDED RELEASE ORAL DAILY
Status: DISCONTINUED | OUTPATIENT
Start: 2024-02-15 | End: 2024-02-25 | Stop reason: HOSPADM

## 2024-02-15 RX ORDER — MIRTAZAPINE 7.5 MG/1
7.5 TABLET, FILM COATED ORAL NIGHTLY
Status: DISCONTINUED | OUTPATIENT
Start: 2024-02-15 | End: 2024-02-17

## 2024-02-15 RX ORDER — EPHEDRINE SULFATE 25 MG/1
25 TABLET, COATED ORAL DAILY
Qty: 30 TABLET | Refills: 0 | Status: SHIPPED | OUTPATIENT
Start: 2024-02-15 | End: 2024-02-26

## 2024-02-15 RX ORDER — ALBUTEROL SULFATE 90 UG/1
2 AEROSOL, METERED RESPIRATORY (INHALATION) EVERY 6 HOURS PRN
Qty: 18 G | Refills: 3 | Status: SHIPPED | OUTPATIENT
Start: 2024-02-15 | End: 2024-02-26

## 2024-02-15 RX ADMIN — WATER 40 MG: 1 INJECTION INTRAMUSCULAR; INTRAVENOUS; SUBCUTANEOUS at 20:51

## 2024-02-15 RX ADMIN — GUAIFENESIN 600 MG: 600 TABLET ORAL at 09:11

## 2024-02-15 RX ADMIN — WATER 40 MG: 1 INJECTION INTRAMUSCULAR; INTRAVENOUS; SUBCUTANEOUS at 09:11

## 2024-02-15 RX ADMIN — WATER 1000 MG: 1 INJECTION INTRAMUSCULAR; INTRAVENOUS; SUBCUTANEOUS at 15:21

## 2024-02-15 RX ADMIN — WATER 40 MG: 1 INJECTION INTRAMUSCULAR; INTRAVENOUS; SUBCUTANEOUS at 15:21

## 2024-02-15 RX ADMIN — AZITHROMYCIN DIHYDRATE 500 MG: 500 INJECTION, POWDER, LYOPHILIZED, FOR SOLUTION INTRAVENOUS at 15:34

## 2024-02-15 RX ADMIN — IPRATROPIUM BROMIDE AND ALBUTEROL SULFATE 1 DOSE: 2.5; .5 SOLUTION RESPIRATORY (INHALATION) at 20:12

## 2024-02-15 RX ADMIN — ENOXAPARIN SODIUM 40 MG: 100 INJECTION SUBCUTANEOUS at 20:50

## 2024-02-15 RX ADMIN — IPRATROPIUM BROMIDE AND ALBUTEROL SULFATE 1 DOSE: 2.5; .5 SOLUTION RESPIRATORY (INHALATION) at 14:22

## 2024-02-15 RX ADMIN — MIRTAZAPINE 7.5 MG: 15 TABLET, FILM COATED ORAL at 20:51

## 2024-02-15 RX ADMIN — SODIUM CHLORIDE, PRESERVATIVE FREE 10 ML: 5 INJECTION INTRAVENOUS at 15:26

## 2024-02-15 RX ADMIN — BUDESONIDE INHALATION 500 MCG: 0.5 SUSPENSION RESPIRATORY (INHALATION) at 20:12

## 2024-02-15 RX ADMIN — IOPAMIDOL 75 ML: 755 INJECTION, SOLUTION INTRAVENOUS at 16:48

## 2024-02-15 RX ADMIN — IPRATROPIUM BROMIDE AND ALBUTEROL SULFATE 1 DOSE: 2.5; .5 SOLUTION RESPIRATORY (INHALATION) at 11:10

## 2024-02-15 RX ADMIN — IPRATROPIUM BROMIDE AND ALBUTEROL SULFATE 1 DOSE: 2.5; .5 SOLUTION RESPIRATORY (INHALATION) at 07:16

## 2024-02-15 RX ADMIN — WATER 40 MG: 1 INJECTION INTRAMUSCULAR; INTRAVENOUS; SUBCUTANEOUS at 02:37

## 2024-02-15 RX ADMIN — BUDESONIDE INHALATION 500 MCG: 0.5 SUSPENSION RESPIRATORY (INHALATION) at 07:16

## 2024-02-15 RX ADMIN — GUAIFENESIN 600 MG: 600 TABLET ORAL at 15:20

## 2024-02-15 RX ADMIN — SODIUM CHLORIDE, PRESERVATIVE FREE 10 ML: 5 INJECTION INTRAVENOUS at 20:54

## 2024-02-15 NOTE — ED NOTES
ED TO INPATIENT SBAR HANDOFF    Patient Name: Pradip Oviedo   : 1955  69 y.o.   Family/Caregiver Present: No  Code Status Order: Prior    C-SSRS: Risk of Suicide: No Risk  Sitter No  Restraints:         Situation  Chief Complaint   Patient presents with    Shortness of Breath     EMS called for SOB due to needing duo-neb. NRB at this time.     Brief Description of Patient's Condition: Increased SOA past three weeks per patient.  Pt states he received a neb treatment and is not sure how to use machine.  Pt is supposed to wear 02 at home, but does not.    Mental Status: oriented, alert, coherent, logical, thought processes intact, and able to concentrate and follow conversation  Arrived from: home    Imaging:   XR CHEST PORTABLE   Final Result   Cardiomegaly and atherosclerosis.  There is a small left pleural effusion with mild adjacent density suggesting atelectasis and / or pneumonia.  Lungs are otherwise grossly clear.  No noticeable vascular congestion, interstitial edema or    pneumothorax.                   ______________________________________    Electronically signed by: DARWIN EVANS M.D.   Date:     2024   Time:    13:52         COVID-19 Results:   Internal Administration   First Dose      Second Dose           Last COVID Lab SARS-CoV-2, PCR (no units)   Date Value   2024 Not Detected     SARS-CoV-2, NAAT (no units)   Date Value   2023 Not Detected     POC Dylan's Test (no units)   Date Value   06/15/2012 Y     POC Glucose (mg/dl)   Date Value   2023 123 (H)           Abnormal labs:   Abnormal Labs Reviewed   BLOOD GAS, ARTERIAL - Abnormal; Notable for the following components:       Result Value    pO2, Arterial 131.0 (*)     All other components within normal limits   CBC WITH AUTO DIFFERENTIAL - Abnormal; Notable for the following components:    MCV 95.1 (*)     MCHC 31.4 (*)     RDW 16.0 (*)     Neutrophils % 75.0 (*)     Lymphocytes % 12.7 (*)     Monocytes % 11.4 (*)

## 2024-02-15 NOTE — CARE COORDINATION
JAZIEL met with spouse briefly who stated \"she is at the end of her whits.. Pt is not eating. I don't know what do to, He thinks he is going to get out of here and drive to [place]\" JAZIEL offered family meeting w/PC NP to discuss goals of care and improved home management of his COPD  Electronically signed by Diana Chaparro RN on 2/15/2024 at 5:06 PM

## 2024-02-15 NOTE — CARE COORDINATION
Case Management Assessment  Initial Evaluation    Date/Time of Evaluation: 2/15/2024 4:55 PM  Assessment Completed by: Diana Chaparro RN    If patient is discharged prior to next notation, then this note serves as note for discharge by case management.    Patient Name: Pradip Oviedo                   YOB: 1955  Diagnosis: COPD exacerbation (HCC) [J44.1]                   Date / Time: 2/14/2024 12:58 PM    Patient Admission Status: Inpatient   Readmission Risk (Low < 19, Mod (19-27), High > 27): Readmission Risk Score: 15.7    Current PCP: No primary care provider on file.  PCP verified by CM? (P)  (pt sees NP at [facility], does not have a PCP)    Chart Reviewed: Yes      History Provided by: (P) Patient  Patient Orientation: (P) Alert and Oriented    Patient Cognition: (P) Alert    Hospitalization in the last 30 days (Readmission):  No    If yes, Readmission Assessment in CM Navigator will be completed.    Advance Directives:      Code Status: Full Code   Patient's Primary Decision Maker is: (P) Legal Next of Kin    Primary Decision Maker: Lexus Oviedo - Spouse - [phone]    Discharge Planning:    Patient lives with: (P) Spouse/Significant Other Type of Home: (P) House  Primary Care Giver: (P) Spouse  Patient Support Systems include: (P) Spouse/Significant Other   Current Financial resources: (P) Medicare  Current community resources: (P)  (none at this time)  Current services prior to admission: (P) None            Current DME:              Type of Home Care services:  (P) Nursing Services    ADLS  Prior functional level: (P) Assistance with the following:  Current functional level: (P) Assistance with the following:    PT AM-PAC:   /24  OT AM-PAC:   /24    Family can provide assistance at DC: (P) Yes (dinah works 3-10 pm)  Would you like Case Management to discuss the discharge plan with any other family members/significant others, and if so, who? (P) Yes  Plans to Return to Present

## 2024-02-16 ENCOUNTER — APPOINTMENT (OUTPATIENT)
Dept: CT IMAGING | Age: 69
DRG: 190 | End: 2024-02-16
Payer: MEDICARE

## 2024-02-16 PROBLEM — I73.1 BUERGER'S DISEASE (HCC): Status: ACTIVE | Noted: 2024-02-16

## 2024-02-16 PROBLEM — R23.0: Status: ACTIVE | Noted: 2024-02-16

## 2024-02-16 LAB
ALLENS TEST: ABNORMAL
AMMONIA PLAS-SCNC: 19 UMOL/L (ref 16–60)
ANION GAP SERPL CALCULATED.3IONS-SCNC: 18 MMOL/L (ref 7–19)
ANION GAP SERPL CALCULATED.3IONS-SCNC: 26 MMOL/L (ref 7–19)
APTT PPP: 32 SEC (ref 26–36.2)
BACTERIA #/AREA URNS HPF: ABNORMAL /HPF
BASE EXCESS ARTERIAL: -4.4 MMOL/L (ref -2–2)
BILIRUB UR QL STRIP: NEGATIVE
BUN SERPL-MCNC: 56 MG/DL (ref 8–23)
BUN SERPL-MCNC: 61 MG/DL (ref 8–23)
CALCIUM SERPL-MCNC: 9.4 MG/DL (ref 8.8–10.2)
CALCIUM SERPL-MCNC: 9.4 MG/DL (ref 8.8–10.2)
CARBOXYHEMOGLOBIN ARTERIAL: 1.8 % (ref 0–5)
CHLORIDE SERPL-SCNC: 101 MMOL/L (ref 98–111)
CHLORIDE SERPL-SCNC: 99 MMOL/L (ref 98–111)
CK SERPL-CCNC: 75 U/L (ref 39–308)
CLARITY UR: CLEAR
CO2 SERPL-SCNC: 12 MMOL/L (ref 22–29)
CO2 SERPL-SCNC: 16 MMOL/L (ref 22–29)
COLOR UR: ABNORMAL
CREAT SERPL-MCNC: 1.3 MG/DL (ref 0.5–1.2)
CREAT SERPL-MCNC: 1.8 MG/DL (ref 0.5–1.2)
ERYTHROCYTE [DISTWIDTH] IN BLOOD BY AUTOMATED COUNT: 16.4 % (ref 11.5–14.5)
ERYTHROCYTE [DISTWIDTH] IN BLOOD BY AUTOMATED COUNT: 16.7 % (ref 11.5–14.5)
FOLATE SERPL-MCNC: >20 NG/ML (ref 4.5–32.2)
GLUCOSE BLD-MCNC: 117 MG/DL (ref 70–99)
GLUCOSE BLD-MCNC: 120 MG/DL (ref 70–99)
GLUCOSE BLD-MCNC: 67 MG/DL (ref 70–99)
GLUCOSE SERPL-MCNC: 137 MG/DL (ref 74–109)
GLUCOSE SERPL-MCNC: 60 MG/DL (ref 74–109)
GLUCOSE UR STRIP.AUTO-MCNC: NEGATIVE MG/DL
HCO3 ARTERIAL: 19.4 MMOL/L (ref 22–26)
HCT VFR BLD AUTO: 48.6 % (ref 42–52)
HCT VFR BLD AUTO: 51.7 % (ref 42–52)
HEMOGLOBIN, ART, EXTENDED: 16.5 G/DL (ref 14–18)
HGB BLD-MCNC: 15.4 G/DL (ref 14–18)
HGB BLD-MCNC: 15.7 G/DL (ref 14–18)
HGB UR STRIP.AUTO-MCNC: ABNORMAL MG/L
HYALINE CASTS #/AREA URNS LPF: ABNORMAL /LPF (ref 0–5)
INR PPP: 1.93 (ref 0.88–1.18)
KETONES UR STRIP.AUTO-MCNC: ABNORMAL MG/DL
LEUKOCYTE ESTERASE UR QL STRIP.AUTO: ABNORMAL
MCH RBC QN AUTO: 30.4 PG (ref 27–31)
MCH RBC QN AUTO: 30.7 PG (ref 27–31)
MCHC RBC AUTO-ENTMCNC: 30.4 G/DL (ref 33–37)
MCHC RBC AUTO-ENTMCNC: 31.7 G/DL (ref 33–37)
MCV RBC AUTO: 101 FL (ref 80–94)
MCV RBC AUTO: 96 FL (ref 80–94)
METHEMOGLOBIN ARTERIAL: 1 %
NITRITE UR QL STRIP.AUTO: NEGATIVE
O2 CONTENT ARTERIAL: 22.3 ML/DL
O2 DELIVERY DEVICE: ABNORMAL
O2 SAT, ARTERIAL: 95.8 %
O2 THERAPY: ABNORMAL
OXYGEN FLOW: 2
PCO2 ARTERIAL: 32 MMHG (ref 35–45)
PERFORMED ON: ABNORMAL
PH ARTERIAL: 7.39 (ref 7.35–7.45)
PH UR STRIP.AUTO: 5.5 [PH] (ref 5–8)
PLATELET # BLD AUTO: 183 K/UL (ref 130–400)
PLATELET # BLD AUTO: 205 K/UL (ref 130–400)
PMV BLD AUTO: 11.2 FL (ref 9.4–12.4)
PMV BLD AUTO: 11.4 FL (ref 9.4–12.4)
PO2 ARTERIAL: 106 MMHG (ref 80–100)
POTASSIUM BLD-SCNC: 5.1 MMOL/L
POTASSIUM SERPL-SCNC: 5.6 MMOL/L (ref 3.5–5)
POTASSIUM SERPL-SCNC: 6.2 MMOL/L (ref 3.5–5)
PROT UR STRIP.AUTO-MCNC: 30 MG/DL
PROTHROMBIN TIME: 21.6 SEC (ref 12–14.6)
RBC # BLD AUTO: 5.06 M/UL (ref 4.7–6.1)
RBC # BLD AUTO: 5.12 M/UL (ref 4.7–6.1)
RBC #/AREA URNS HPF: ABNORMAL /HPF (ref 0–2)
REASON FOR REJECTION: NORMAL
REASON FOR REJECTION: NORMAL
REJECTED TEST: NORMAL
REJECTED TEST: NORMAL
SAMPLE SOURCE: ABNORMAL
SODIUM SERPL-SCNC: 135 MMOL/L (ref 136–145)
SODIUM SERPL-SCNC: 137 MMOL/L (ref 136–145)
SP GR UR STRIP.AUTO: >=1.045 (ref 1–1.03)
SPONT RATE(BPM): 26
SQUAMOUS #/AREA URNS HPF: ABNORMAL /HPF
TROPONIN, HIGH SENSITIVITY: 44 NG/L (ref 0–22)
UROBILINOGEN UR STRIP.AUTO-MCNC: 0.2 E.U./DL
VIT B12 SERPL-MCNC: 1958 PG/ML (ref 211–946)
WBC # BLD AUTO: 8.2 K/UL (ref 4.8–10.8)
WBC # BLD AUTO: 8.2 K/UL (ref 4.8–10.8)
WBC #/AREA URNS HPF: ABNORMAL /HPF (ref 0–5)

## 2024-02-16 PROCEDURE — 51701 INSERT BLADDER CATHETER: CPT

## 2024-02-16 PROCEDURE — 94640 AIRWAY INHALATION TREATMENT: CPT

## 2024-02-16 PROCEDURE — 6360000004 HC RX CONTRAST MEDICATION: Performed by: NURSE PRACTITIONER

## 2024-02-16 PROCEDURE — 84484 ASSAY OF TROPONIN QUANT: CPT

## 2024-02-16 PROCEDURE — 82803 BLOOD GASES ANY COMBINATION: CPT

## 2024-02-16 PROCEDURE — 6360000002 HC RX W HCPCS: Performed by: NURSE PRACTITIONER

## 2024-02-16 PROCEDURE — 2700000000 HC OXYGEN THERAPY PER DAY

## 2024-02-16 PROCEDURE — 99233 SBSQ HOSP IP/OBS HIGH 50: CPT

## 2024-02-16 PROCEDURE — 76937 US GUIDE VASCULAR ACCESS: CPT

## 2024-02-16 PROCEDURE — 82140 ASSAY OF AMMONIA: CPT

## 2024-02-16 PROCEDURE — 85610 PROTHROMBIN TIME: CPT

## 2024-02-16 PROCEDURE — 2709999900 HC NON-CHARGEABLE SUPPLY

## 2024-02-16 PROCEDURE — 05H933Z INSERTION OF INFUSION DEVICE INTO RIGHT BRACHIAL VEIN, PERCUTANEOUS APPROACH: ICD-10-PCS | Performed by: INTERNAL MEDICINE

## 2024-02-16 PROCEDURE — 2580000003 HC RX 258: Performed by: NURSE PRACTITIONER

## 2024-02-16 PROCEDURE — 82607 VITAMIN B-12: CPT

## 2024-02-16 PROCEDURE — 2580000003 HC RX 258: Performed by: HOSPITALIST

## 2024-02-16 PROCEDURE — 82962 GLUCOSE BLOOD TEST: CPT

## 2024-02-16 PROCEDURE — 6370000000 HC RX 637 (ALT 250 FOR IP): Performed by: HOSPITALIST

## 2024-02-16 PROCEDURE — 93923 UPR/LXTR ART STDY 3+ LVLS: CPT

## 2024-02-16 PROCEDURE — 80048 BASIC METABOLIC PNL TOTAL CA: CPT

## 2024-02-16 PROCEDURE — 6370000000 HC RX 637 (ALT 250 FOR IP): Performed by: INTERNAL MEDICINE

## 2024-02-16 PROCEDURE — 36415 COLL VENOUS BLD VENIPUNCTURE: CPT

## 2024-02-16 PROCEDURE — 6370000000 HC RX 637 (ALT 250 FOR IP): Performed by: NURSE PRACTITIONER

## 2024-02-16 PROCEDURE — 85730 THROMBOPLASTIN TIME PARTIAL: CPT

## 2024-02-16 PROCEDURE — 70450 CT HEAD/BRAIN W/O DYE: CPT

## 2024-02-16 PROCEDURE — 85027 COMPLETE CBC AUTOMATED: CPT

## 2024-02-16 PROCEDURE — 82746 ASSAY OF FOLIC ACID SERUM: CPT

## 2024-02-16 PROCEDURE — 82550 ASSAY OF CK (CPK): CPT

## 2024-02-16 PROCEDURE — 94760 N-INVAS EAR/PLS OXIMETRY 1: CPT

## 2024-02-16 PROCEDURE — 36410 VNPNXR 3YR/> PHY/QHP DX/THER: CPT

## 2024-02-16 PROCEDURE — 99222 1ST HOSP IP/OBS MODERATE 55: CPT | Performed by: SURGERY

## 2024-02-16 PROCEDURE — 2500000003 HC RX 250 WO HCPCS: Performed by: HOSPITALIST

## 2024-02-16 PROCEDURE — 2000000000 HC ICU R&B

## 2024-02-16 PROCEDURE — 6360000002 HC RX W HCPCS: Performed by: INTERNAL MEDICINE

## 2024-02-16 PROCEDURE — 2580000003 HC RX 258: Performed by: INTERNAL MEDICINE

## 2024-02-16 PROCEDURE — 81001 URINALYSIS AUTO W/SCOPE: CPT

## 2024-02-16 PROCEDURE — 75635 CT ANGIO ABDOMINAL ARTERIES: CPT

## 2024-02-16 PROCEDURE — 36600 WITHDRAWAL OF ARTERIAL BLOOD: CPT

## 2024-02-16 RX ORDER — DEXTROSE MONOHYDRATE 100 MG/ML
INJECTION, SOLUTION INTRAVENOUS CONTINUOUS PRN
Status: DISCONTINUED | OUTPATIENT
Start: 2024-02-16 | End: 2024-02-25 | Stop reason: HOSPADM

## 2024-02-16 RX ORDER — HEPARIN SODIUM 1000 [USP'U]/ML
30 INJECTION, SOLUTION INTRAVENOUS; SUBCUTANEOUS PRN
Status: DISCONTINUED | OUTPATIENT
Start: 2024-02-16 | End: 2024-02-19

## 2024-02-16 RX ORDER — ASPIRIN 81 MG/1
81 TABLET, CHEWABLE ORAL DAILY
Status: DISCONTINUED | OUTPATIENT
Start: 2024-02-16 | End: 2024-02-25 | Stop reason: HOSPADM

## 2024-02-16 RX ORDER — SODIUM POLYSTYRENE SULFONATE 15 G/60ML
15 SUSPENSION ORAL; RECTAL ONCE
Status: COMPLETED | OUTPATIENT
Start: 2024-02-16 | End: 2024-02-16

## 2024-02-16 RX ORDER — HEPARIN SODIUM 10000 [USP'U]/100ML
5-30 INJECTION, SOLUTION INTRAVENOUS CONTINUOUS
Status: DISCONTINUED | OUTPATIENT
Start: 2024-02-16 | End: 2024-02-16 | Stop reason: SDUPTHER

## 2024-02-16 RX ORDER — HEPARIN SODIUM 1000 [USP'U]/ML
60 INJECTION, SOLUTION INTRAVENOUS; SUBCUTANEOUS PRN
Status: DISCONTINUED | OUTPATIENT
Start: 2024-02-16 | End: 2024-02-19

## 2024-02-16 RX ORDER — HEPARIN SODIUM 1000 [USP'U]/ML
60 INJECTION, SOLUTION INTRAVENOUS; SUBCUTANEOUS ONCE
Status: DISCONTINUED | OUTPATIENT
Start: 2024-02-16 | End: 2024-02-16 | Stop reason: SDUPTHER

## 2024-02-16 RX ORDER — HEPARIN SODIUM 10000 [USP'U]/100ML
5-30 INJECTION, SOLUTION INTRAVENOUS CONTINUOUS
Status: DISCONTINUED | OUTPATIENT
Start: 2024-02-16 | End: 2024-02-19

## 2024-02-16 RX ORDER — SODIUM CHLORIDE 9 MG/ML
INJECTION, SOLUTION INTRAVENOUS CONTINUOUS
Status: DISCONTINUED | OUTPATIENT
Start: 2024-02-16 | End: 2024-02-16

## 2024-02-16 RX ORDER — HEPARIN SODIUM 1000 [USP'U]/ML
60 INJECTION, SOLUTION INTRAVENOUS; SUBCUTANEOUS ONCE
Status: COMPLETED | OUTPATIENT
Start: 2024-02-16 | End: 2024-02-16

## 2024-02-16 RX ORDER — HEPARIN SODIUM 1000 [USP'U]/ML
60 INJECTION, SOLUTION INTRAVENOUS; SUBCUTANEOUS PRN
Status: DISCONTINUED | OUTPATIENT
Start: 2024-02-16 | End: 2024-02-16 | Stop reason: SDUPTHER

## 2024-02-16 RX ORDER — LABETALOL HYDROCHLORIDE 5 MG/ML
5 INJECTION, SOLUTION INTRAVENOUS EVERY 4 HOURS PRN
Status: DISCONTINUED | OUTPATIENT
Start: 2024-02-16 | End: 2024-02-17

## 2024-02-16 RX ORDER — DEXTROSE MONOHYDRATE 25 G/50ML
25 INJECTION, SOLUTION INTRAVENOUS ONCE
Status: COMPLETED | OUTPATIENT
Start: 2024-02-16 | End: 2024-02-16

## 2024-02-16 RX ORDER — CALCIUM GLUCONATE 20 MG/ML
1000 INJECTION, SOLUTION INTRAVENOUS ONCE
Status: COMPLETED | OUTPATIENT
Start: 2024-02-16 | End: 2024-02-17

## 2024-02-16 RX ORDER — HEPARIN SODIUM 1000 [USP'U]/ML
30 INJECTION, SOLUTION INTRAVENOUS; SUBCUTANEOUS PRN
Status: DISCONTINUED | OUTPATIENT
Start: 2024-02-16 | End: 2024-02-16 | Stop reason: SDUPTHER

## 2024-02-16 RX ORDER — HYDRALAZINE HYDROCHLORIDE 20 MG/ML
5 INJECTION INTRAMUSCULAR; INTRAVENOUS EVERY 4 HOURS PRN
Status: DISCONTINUED | OUTPATIENT
Start: 2024-02-16 | End: 2024-02-17

## 2024-02-16 RX ADMIN — SODIUM BICARBONATE 25 MEQ: 84 INJECTION, SOLUTION INTRAVENOUS at 23:05

## 2024-02-16 RX ADMIN — SODIUM CHLORIDE, PRESERVATIVE FREE 10 ML: 5 INJECTION INTRAVENOUS at 13:48

## 2024-02-16 RX ADMIN — HEPARIN SODIUM 12 UNITS/KG/HR: 10000 INJECTION, SOLUTION INTRAVENOUS at 16:48

## 2024-02-16 RX ADMIN — ASPIRIN 81 MG 81 MG: 81 TABLET ORAL at 16:29

## 2024-02-16 RX ADMIN — IOPAMIDOL 75 ML: 755 INJECTION, SOLUTION INTRAVENOUS at 15:14

## 2024-02-16 RX ADMIN — IPRATROPIUM BROMIDE AND ALBUTEROL SULFATE 1 DOSE: 2.5; .5 SOLUTION RESPIRATORY (INHALATION) at 07:40

## 2024-02-16 RX ADMIN — WATER 40 MG: 1 INJECTION INTRAMUSCULAR; INTRAVENOUS; SUBCUTANEOUS at 03:04

## 2024-02-16 RX ADMIN — BUDESONIDE INHALATION 500 MCG: 0.5 SUSPENSION RESPIRATORY (INHALATION) at 19:29

## 2024-02-16 RX ADMIN — AZITHROMYCIN DIHYDRATE 500 MG: 500 INJECTION, POWDER, LYOPHILIZED, FOR SOLUTION INTRAVENOUS at 15:33

## 2024-02-16 RX ADMIN — INSULIN HUMAN 10 UNITS: 100 INJECTION, SOLUTION PARENTERAL at 23:13

## 2024-02-16 RX ADMIN — BUDESONIDE INHALATION 500 MCG: 0.5 SUSPENSION RESPIRATORY (INHALATION) at 07:40

## 2024-02-16 RX ADMIN — IPRATROPIUM BROMIDE AND ALBUTEROL SULFATE 1 DOSE: 2.5; .5 SOLUTION RESPIRATORY (INHALATION) at 19:29

## 2024-02-16 RX ADMIN — IPRATROPIUM BROMIDE AND ALBUTEROL SULFATE 1 DOSE: 2.5; .5 SOLUTION RESPIRATORY (INHALATION) at 10:55

## 2024-02-16 RX ADMIN — WATER 1000 MG: 1 INJECTION INTRAMUSCULAR; INTRAVENOUS; SUBCUTANEOUS at 15:33

## 2024-02-16 RX ADMIN — WATER 40 MG: 1 INJECTION INTRAMUSCULAR; INTRAVENOUS; SUBCUTANEOUS at 09:40

## 2024-02-16 RX ADMIN — SODIUM BICARBONATE: 84 INJECTION, SOLUTION INTRAVENOUS at 23:17

## 2024-02-16 RX ADMIN — HEPARIN SODIUM 3810 UNITS: 1000 INJECTION INTRAVENOUS; SUBCUTANEOUS at 16:29

## 2024-02-16 RX ADMIN — WATER 40 MG: 1 INJECTION INTRAMUSCULAR; INTRAVENOUS; SUBCUTANEOUS at 20:47

## 2024-02-16 RX ADMIN — CALCIUM GLUCONATE 1000 MG: 20 INJECTION, SOLUTION INTRAVENOUS at 23:26

## 2024-02-16 RX ADMIN — SODIUM CHLORIDE, PRESERVATIVE FREE 10 ML: 5 INJECTION INTRAVENOUS at 20:47

## 2024-02-16 RX ADMIN — SODIUM POLYSTYRENE SULFONATE 15 G: 15 SUSPENSION ORAL; RECTAL at 23:10

## 2024-02-16 RX ADMIN — SODIUM CHLORIDE: 9 INJECTION, SOLUTION INTRAVENOUS at 13:50

## 2024-02-16 RX ADMIN — DEXTROSE MONOHYDRATE 125 ML: 100 INJECTION, SOLUTION INTRAVENOUS at 22:51

## 2024-02-16 RX ADMIN — DEXTROSE MONOHYDRATE 25 G: 25 INJECTION, SOLUTION INTRAVENOUS at 23:09

## 2024-02-16 NOTE — PROCEDURES
[facility] Vascular Access Team:  PowerGlide Midline/Extended Dwell IV Catheter  Insertion Procedure Note      Patient: Pradip Oviedo  YOB: 1955   MRN: [id number]  Room: [address] [place] [address]     Attending Physician - Ximena Chi MD  Ordering Physician - Ximena Chi MD    Diagnosis:   COPD exacerbation (HCC) [J44.1]       Procedure(s):   Insertion of a 20 gauge 10 cm Single Lumen PowerGlide Catheter    Indication(s):  Poor venous access, Incompatible IV medications     Date of Procedure: 2/16/2024     Performed by: Paras Dutta RN    Complications: None      Findings:   1. Successful insertion of PowerGlide Catheter.  2. PowerGlide is ready to be used. Please change tubing prior to using the new line. Make sure to label, date and use alcohol caps on new tubing and alcohol caps on unused ports.   3. Patient may be changed to a unit draw for lab work.        Detailed Description of Procedure:   The Right Brachial vein was visualized using the ultrasound and deemed a suitable vessel. The area was prepped with Chlorhexidine and draped in sterile fashion using partial barrier draping. The vein was accessed using US guidance. The 20 gauge 10 cm Single Lumen PowerGlide catheter was advanced into the vein using ANTT. Approximately 0 cm exposed. All lumens had brisk blood return and was flushed with 10 cc of NS. The catheter was secured using a securement device. A 3M CHG Tegaderm was placed over the securement device and insertion site. Dressing was dated and initialed with external measurement marked. Patient did tolerate procedure well.        Electronically signed by Paras Dutta RN on 2/16/2024 at 4:21 PM

## 2024-02-17 ENCOUNTER — APPOINTMENT (OUTPATIENT)
Dept: ULTRASOUND IMAGING | Age: 69
DRG: 190 | End: 2024-02-17
Payer: MEDICARE

## 2024-02-17 ENCOUNTER — APPOINTMENT (OUTPATIENT)
Dept: MRI IMAGING | Age: 69
DRG: 190 | End: 2024-02-17
Payer: MEDICARE

## 2024-02-17 PROBLEM — E44.1 MILD MALNUTRITION (HCC): Status: ACTIVE | Noted: 2024-02-17

## 2024-02-17 LAB
25(OH)D3 SERPL-MCNC: 11.2 NG/ML
25(OH)D3 SERPL-MCNC: 9.3 NG/ML
ALBUMIN SERPL-MCNC: 3.4 G/DL (ref 3.5–5.2)
ALLENS TEST: ABNORMAL
ALLENS TEST: ABNORMAL
ALP SERPL-CCNC: 85 U/L (ref 40–130)
ALT SERPL-CCNC: 768 U/L (ref 5–41)
AMMONIA PLAS-SCNC: 23 UMOL/L (ref 16–60)
AMPHET UR QL SCN: NEGATIVE
ANION GAP SERPL CALCULATED.3IONS-SCNC: 13 MMOL/L (ref 7–19)
ANION GAP SERPL CALCULATED.3IONS-SCNC: 21 MMOL/L (ref 7–19)
APTT PPP: 124.3 SEC (ref 26–36.2)
APTT PPP: 62.8 SEC (ref 26–36.2)
APTT PPP: 70.1 SEC (ref 26–36.2)
APTT PPP: >200 SEC (ref 26–36.2)
AST SERPL-CCNC: 1235 U/L (ref 5–40)
BACTERIA #/AREA URNS HPF: ABNORMAL /HPF
BARBITURATES UR QL SCN: NEGATIVE
BASE EXCESS ARTERIAL: -3.5 MMOL/L (ref -2–2)
BASE EXCESS ARTERIAL: -3.9 MMOL/L (ref -2–2)
BENZODIAZ UR QL SCN: NEGATIVE
BILIRUB SERPL-MCNC: 1.4 MG/DL (ref 0.2–1.2)
BILIRUB UR STRIP.AUTO-MCNC: NEGATIVE MG/DL
BNP BLD-MCNC: ABNORMAL PG/ML (ref 0–124)
BUN SERPL-MCNC: 62 MG/DL (ref 8–23)
BUN SERPL-MCNC: 68 MG/DL (ref 8–23)
BUPRENORPHINE URINE: NEGATIVE
CALCIUM SERPL-MCNC: 8.6 MG/DL (ref 8.8–10.2)
CALCIUM SERPL-MCNC: 8.8 MG/DL (ref 8.8–10.2)
CANNABINOIDS UR QL SCN: NEGATIVE
CARBOXYHEMOGLOBIN ARTERIAL: 1.5 % (ref 0–5)
CARBOXYHEMOGLOBIN ARTERIAL: 1.7 % (ref 0–5)
CHLORIDE SERPL-SCNC: 99 MMOL/L (ref 98–111)
CHLORIDE SERPL-SCNC: 99 MMOL/L (ref 98–111)
CLARITY UR: ABNORMAL
CO2 SERPL-SCNC: 18 MMOL/L (ref 22–29)
CO2 SERPL-SCNC: 24 MMOL/L (ref 22–29)
COCAINE UR QL SCN: NEGATIVE
COLOR UR: ABNORMAL
CREAT SERPL-MCNC: 1.8 MG/DL (ref 0.5–1.2)
CREAT SERPL-MCNC: 1.8 MG/DL (ref 0.5–1.2)
CREAT UR-MCNC: 103.1 MG/DL (ref 39–259)
DRUG SCREEN COMMENT UR-IMP: ABNORMAL
ERYTHROCYTE [DISTWIDTH] IN BLOOD BY AUTOMATED COUNT: 16.7 % (ref 11.5–14.5)
FENTANYL SCREEN, URINE: NEGATIVE
GLUCOSE BLD-MCNC: 118 MG/DL (ref 70–99)
GLUCOSE BLD-MCNC: 135 MG/DL (ref 70–99)
GLUCOSE BLD-MCNC: 145 MG/DL (ref 70–99)
GLUCOSE BLD-MCNC: 212 MG/DL (ref 70–99)
GLUCOSE BLD-MCNC: 64 MG/DL (ref 70–99)
GLUCOSE SERPL-MCNC: 179 MG/DL (ref 74–109)
GLUCOSE SERPL-MCNC: 241 MG/DL (ref 74–109)
GLUCOSE UR STRIP.AUTO-MCNC: 100 MG/DL
HAV IGM SERPL QL IA: NORMAL
HBV CORE IGM SERPL QL IA: NORMAL
HBV SURFACE AG SERPL QL IA: NORMAL
HCO3 ARTERIAL: 20.8 MMOL/L (ref 22–26)
HCO3 ARTERIAL: 21.5 MMOL/L (ref 22–26)
HCT VFR BLD AUTO: 47.9 % (ref 42–52)
HCV AB SERPL QL IA: NORMAL
HEMOGLOBIN, ART, EXTENDED: 14.4 G/DL (ref 14–18)
HEMOGLOBIN, ART, EXTENDED: 15.1 G/DL (ref 14–18)
HGB BLD-MCNC: 15.1 G/DL (ref 14–18)
HGB UR STRIP.AUTO-MCNC: ABNORMAL MG/L
INR PPP: 3.09 (ref 0.88–1.18)
KETONES UR STRIP.AUTO-MCNC: ABNORMAL MG/DL
LACTATE BLDV-SCNC: 2.5 MMOL/L (ref 0.5–1.9)
LEUKOCYTE ESTERASE UR QL STRIP.AUTO: ABNORMAL
MAGNESIUM SERPL-MCNC: 2.7 MG/DL (ref 1.6–2.4)
MCH RBC QN AUTO: 30.4 PG (ref 27–31)
MCHC RBC AUTO-ENTMCNC: 31.5 G/DL (ref 33–37)
MCV RBC AUTO: 96.4 FL (ref 80–94)
METHADONE UR QL SCN: NEGATIVE
METHAMPHETAMINE, URINE: POSITIVE
METHEMOGLOBIN ARTERIAL: 1.3 %
METHEMOGLOBIN ARTERIAL: 1.3 %
MICROALBUMIN UR-MCNC: 20 MG/DL (ref 0–19)
MICROALBUMIN/CREAT UR-RTO: 194 MG/G
NITRITE UR QL STRIP.AUTO: POSITIVE
O2 CONTENT ARTERIAL: 19.8 ML/DL
O2 CONTENT ARTERIAL: 20.9 ML/DL
O2 DELIVERY DEVICE: ABNORMAL
O2 DELIVERY DEVICE: ABNORMAL
O2 SAT, ARTERIAL: 96.5 %
O2 SAT, ARTERIAL: 96.8 %
O2 THERAPY: ABNORMAL
O2 THERAPY: ABNORMAL
OPIATES UR QL SCN: NEGATIVE
OXYCODONE UR QL SCN: NEGATIVE
OXYGEN FLOW: 2
OXYGEN FLOW: 6
PCO2 ARTERIAL: 36 MMHG (ref 35–45)
PCO2 ARTERIAL: 38 MMHG (ref 35–45)
PCP UR QL SCN: NEGATIVE
PERFORMED ON: ABNORMAL
PH ARTERIAL: 7.36 (ref 7.35–7.45)
PH ARTERIAL: 7.37 (ref 7.35–7.45)
PH UR STRIP.AUTO: 5 [PH] (ref 5–8)
PHOSPHATE SERPL-MCNC: 6.2 MG/DL (ref 2.5–4.5)
PLATELET # BLD AUTO: 178 K/UL (ref 130–400)
PMV BLD AUTO: 11.9 FL (ref 9.4–12.4)
PO2 ARTERIAL: 142 MMHG (ref 80–100)
PO2 ARTERIAL: 197 MMHG (ref 80–100)
POTASSIUM BLD-SCNC: 4.5 MMOL/L
POTASSIUM BLD-SCNC: 4.7 MMOL/L
POTASSIUM SERPL-SCNC: 4.6 MMOL/L (ref 3.5–5)
POTASSIUM SERPL-SCNC: 5.9 MMOL/L (ref 3.5–5)
PROT SERPL-MCNC: 5.7 G/DL (ref 6.6–8.7)
PROT UR STRIP.AUTO-MCNC: 100 MG/DL
PROTHROMBIN TIME: 31.2 SEC (ref 12–14.6)
PTH-INTACT SERPL-MCNC: 160.7 PG/ML (ref 15–65)
RBC # BLD AUTO: 4.97 M/UL (ref 4.7–6.1)
RBC #/AREA URNS HPF: ABNORMAL /HPF (ref 0–2)
SAMPLE SOURCE: ABNORMAL
SAMPLE SOURCE: ABNORMAL
SODIUM SERPL-SCNC: 136 MMOL/L (ref 136–145)
SODIUM SERPL-SCNC: 138 MMOL/L (ref 136–145)
SODIUM UR-SCNC: <20 MMOL/L
SP GR UR STRIP.AUTO: 1.01 (ref 1–1.03)
SQUAMOUS #/AREA URNS HPF: ABNORMAL /HPF
TRICYCLIC, URINE: NEGATIVE
TSH SERPL DL<=0.005 MIU/L-ACNC: 3.07 UIU/ML (ref 0.27–4.2)
URATE SERPL-MCNC: 14 MG/DL (ref 3.4–7)
UROBILINOGEN UR STRIP.AUTO-MCNC: 1 E.U./DL
WBC # BLD AUTO: 8.9 K/UL (ref 4.8–10.8)
WBC #/AREA URNS HPF: ABNORMAL /HPF (ref 0–5)

## 2024-02-17 PROCEDURE — 84300 ASSAY OF URINE SODIUM: CPT

## 2024-02-17 PROCEDURE — 6360000002 HC RX W HCPCS: Performed by: NURSE PRACTITIONER

## 2024-02-17 PROCEDURE — 2580000003 HC RX 258: Performed by: NURSE PRACTITIONER

## 2024-02-17 PROCEDURE — 6360000004 HC RX CONTRAST MEDICATION: Performed by: NURSE PRACTITIONER

## 2024-02-17 PROCEDURE — 82803 BLOOD GASES ANY COMBINATION: CPT

## 2024-02-17 PROCEDURE — 83970 ASSAY OF PARATHORMONE: CPT

## 2024-02-17 PROCEDURE — C8929 TTE W OR WO FOL WCON,DOPPLER: HCPCS

## 2024-02-17 PROCEDURE — 6370000000 HC RX 637 (ALT 250 FOR IP): Performed by: INTERNAL MEDICINE

## 2024-02-17 PROCEDURE — 85730 THROMBOPLASTIN TIME PARTIAL: CPT

## 2024-02-17 PROCEDURE — 76770 US EXAM ABDO BACK WALL COMP: CPT

## 2024-02-17 PROCEDURE — 36600 WITHDRAWAL OF ARTERIAL BLOOD: CPT

## 2024-02-17 PROCEDURE — 94640 AIRWAY INHALATION TREATMENT: CPT

## 2024-02-17 PROCEDURE — 51702 INSERT TEMP BLADDER CATH: CPT

## 2024-02-17 PROCEDURE — 2000000000 HC ICU R&B

## 2024-02-17 PROCEDURE — 84100 ASSAY OF PHOSPHORUS: CPT

## 2024-02-17 PROCEDURE — 80074 ACUTE HEPATITIS PANEL: CPT

## 2024-02-17 PROCEDURE — 81001 URINALYSIS AUTO W/SCOPE: CPT

## 2024-02-17 PROCEDURE — 85610 PROTHROMBIN TIME: CPT

## 2024-02-17 PROCEDURE — 83605 ASSAY OF LACTIC ACID: CPT

## 2024-02-17 PROCEDURE — 85027 COMPLETE CBC AUTOMATED: CPT

## 2024-02-17 PROCEDURE — 80307 DRUG TEST PRSMV CHEM ANLYZR: CPT

## 2024-02-17 PROCEDURE — 94760 N-INVAS EAR/PLS OXIMETRY 1: CPT

## 2024-02-17 PROCEDURE — 36415 COLL VENOUS BLD VENIPUNCTURE: CPT

## 2024-02-17 PROCEDURE — G0480 DRUG TEST DEF 1-7 CLASSES: HCPCS

## 2024-02-17 PROCEDURE — 6370000000 HC RX 637 (ALT 250 FOR IP): Performed by: NURSE PRACTITIONER

## 2024-02-17 PROCEDURE — 92610 EVALUATE SWALLOWING FUNCTION: CPT

## 2024-02-17 PROCEDURE — 87040 BLOOD CULTURE FOR BACTERIA: CPT

## 2024-02-17 PROCEDURE — 6360000002 HC RX W HCPCS: Performed by: INTERNAL MEDICINE

## 2024-02-17 PROCEDURE — 80053 COMPREHEN METABOLIC PANEL: CPT

## 2024-02-17 PROCEDURE — 82140 ASSAY OF AMMONIA: CPT

## 2024-02-17 PROCEDURE — 2700000000 HC OXYGEN THERAPY PER DAY

## 2024-02-17 PROCEDURE — 82570 ASSAY OF URINE CREATININE: CPT

## 2024-02-17 PROCEDURE — 82306 VITAMIN D 25 HYDROXY: CPT

## 2024-02-17 PROCEDURE — 82043 UR ALBUMIN QUANTITATIVE: CPT

## 2024-02-17 PROCEDURE — 84550 ASSAY OF BLOOD/URIC ACID: CPT

## 2024-02-17 PROCEDURE — 83735 ASSAY OF MAGNESIUM: CPT

## 2024-02-17 PROCEDURE — 84443 ASSAY THYROID STIM HORMONE: CPT

## 2024-02-17 PROCEDURE — 83880 ASSAY OF NATRIURETIC PEPTIDE: CPT

## 2024-02-17 PROCEDURE — 82962 GLUCOSE BLOOD TEST: CPT

## 2024-02-17 PROCEDURE — 70551 MRI BRAIN STEM W/O DYE: CPT

## 2024-02-17 PROCEDURE — 87086 URINE CULTURE/COLONY COUNT: CPT

## 2024-02-17 PROCEDURE — 2580000003 HC RX 258: Performed by: INTERNAL MEDICINE

## 2024-02-17 RX ORDER — SODIUM CHLORIDE 9 MG/ML
INJECTION, SOLUTION INTRAVENOUS CONTINUOUS
Status: DISCONTINUED | OUTPATIENT
Start: 2024-02-17 | End: 2024-02-21

## 2024-02-17 RX ORDER — METRONIDAZOLE 500 MG/100ML
500 INJECTION, SOLUTION INTRAVENOUS EVERY 8 HOURS
Status: DISCONTINUED | OUTPATIENT
Start: 2024-02-17 | End: 2024-02-20

## 2024-02-17 RX ADMIN — WATER 40 MG: 1 INJECTION INTRAMUSCULAR; INTRAVENOUS; SUBCUTANEOUS at 08:11

## 2024-02-17 RX ADMIN — AZITHROMYCIN DIHYDRATE 500 MG: 500 INJECTION, POWDER, LYOPHILIZED, FOR SOLUTION INTRAVENOUS at 14:05

## 2024-02-17 RX ADMIN — SODIUM CHLORIDE: 9 INJECTION, SOLUTION INTRAVENOUS at 23:37

## 2024-02-17 RX ADMIN — IPRATROPIUM BROMIDE AND ALBUTEROL SULFATE 1 DOSE: 2.5; .5 SOLUTION RESPIRATORY (INHALATION) at 14:33

## 2024-02-17 RX ADMIN — METRONIDAZOLE 500 MG: 500 INJECTION, SOLUTION INTRAVENOUS at 08:31

## 2024-02-17 RX ADMIN — PERFLUTREN 1.5 ML: 6.52 INJECTION, SUSPENSION INTRAVENOUS at 14:03

## 2024-02-17 RX ADMIN — SODIUM CHLORIDE, PRESERVATIVE FREE 10 ML: 5 INJECTION INTRAVENOUS at 08:14

## 2024-02-17 RX ADMIN — SODIUM CHLORIDE, PRESERVATIVE FREE 10 ML: 5 INJECTION INTRAVENOUS at 20:33

## 2024-02-17 RX ADMIN — VANCOMYCIN HYDROCHLORIDE 1500 MG: 10 INJECTION, POWDER, LYOPHILIZED, FOR SOLUTION INTRAVENOUS at 11:26

## 2024-02-17 RX ADMIN — METRONIDAZOLE 500 MG: 500 INJECTION, SOLUTION INTRAVENOUS at 16:13

## 2024-02-17 RX ADMIN — IPRATROPIUM BROMIDE AND ALBUTEROL SULFATE 1 DOSE: 2.5; .5 SOLUTION RESPIRATORY (INHALATION) at 06:32

## 2024-02-17 RX ADMIN — SODIUM ZIRCONIUM CYCLOSILICATE 10 G: 5 POWDER, FOR SUSPENSION ORAL at 08:15

## 2024-02-17 RX ADMIN — WATER 40 MG: 1 INJECTION INTRAMUSCULAR; INTRAVENOUS; SUBCUTANEOUS at 20:26

## 2024-02-17 RX ADMIN — IPRATROPIUM BROMIDE AND ALBUTEROL SULFATE 1 DOSE: 2.5; .5 SOLUTION RESPIRATORY (INHALATION) at 18:22

## 2024-02-17 RX ADMIN — WATER 1000 MG: 1 INJECTION INTRAMUSCULAR; INTRAVENOUS; SUBCUTANEOUS at 14:03

## 2024-02-17 RX ADMIN — BUDESONIDE INHALATION 500 MCG: 0.5 SUSPENSION RESPIRATORY (INHALATION) at 06:32

## 2024-02-17 RX ADMIN — SODIUM ZIRCONIUM CYCLOSILICATE 10 G: 5 POWDER, FOR SUSPENSION ORAL at 14:03

## 2024-02-17 RX ADMIN — HEPARIN SODIUM 1910 UNITS: 1000 INJECTION INTRAVENOUS; SUBCUTANEOUS at 06:24

## 2024-02-17 RX ADMIN — SODIUM CHLORIDE: 9 INJECTION, SOLUTION INTRAVENOUS at 08:10

## 2024-02-17 RX ADMIN — IPRATROPIUM BROMIDE AND ALBUTEROL SULFATE 1 DOSE: 2.5; .5 SOLUTION RESPIRATORY (INHALATION) at 10:32

## 2024-02-17 RX ADMIN — BUDESONIDE INHALATION 500 MCG: 0.5 SUSPENSION RESPIRATORY (INHALATION) at 18:22

## 2024-02-17 ASSESSMENT — PAIN SCALES - GENERAL: PAINLEVEL_OUTOF10: 0

## 2024-02-17 NOTE — PLAN OF CARE
Problem: Nutrition Deficit:  Goal: Optimize nutritional status  2/17/2024 1223 by Dimple Martin, MS, RD, LD  Outcome: Progressing  Flowsheets (Taken 2/17/2024 1211)  Nutrient intake appropriate for improving, restoring, or maintaining nutritional needs:   Assess nutritional status and recommend course of action   Monitor oral intake, labs, and treatment plans   Recommend appropriate diets, oral nutritional supplements, and vitamin/mineral supplements  2/17/2024 1050 by Cynthia Kemp, RN  Outcome: Progressing

## 2024-02-18 LAB
ALBUMIN SERPL-MCNC: 3 G/DL (ref 3.5–5.2)
ALP SERPL-CCNC: 85 U/L (ref 40–130)
ALT SERPL-CCNC: 2108 U/L (ref 5–41)
ANION GAP SERPL CALCULATED.3IONS-SCNC: 14 MMOL/L (ref 7–19)
APAP SERPL-MCNC: <5 UG/ML (ref 10–30)
APTT PPP: 143.6 SEC (ref 26–36.2)
APTT PPP: 63.4 SEC (ref 26–36.2)
APTT PPP: 63.8 SEC (ref 26–36.2)
AST SERPL-CCNC: 3600 U/L (ref 5–40)
BASOPHILS # BLD: 0 K/UL (ref 0–0.2)
BASOPHILS NFR BLD: 0.1 % (ref 0–1)
BILIRUB SERPL-MCNC: 1.9 MG/DL (ref 0.2–1.2)
BUN SERPL-MCNC: 68 MG/DL (ref 8–23)
CALCIUM SERPL-MCNC: 7.8 MG/DL (ref 8.8–10.2)
CHLORIDE SERPL-SCNC: 100 MMOL/L (ref 98–111)
CO2 SERPL-SCNC: 23 MMOL/L (ref 22–29)
CREAT SERPL-MCNC: 1.7 MG/DL (ref 0.5–1.2)
EOSINOPHIL # BLD: 0 K/UL (ref 0–0.6)
EOSINOPHIL NFR BLD: 0 % (ref 0–5)
ERYTHROCYTE [DISTWIDTH] IN BLOOD BY AUTOMATED COUNT: 16.3 % (ref 11.5–14.5)
GLUCOSE BLD-MCNC: 116 MG/DL (ref 70–99)
GLUCOSE BLD-MCNC: 134 MG/DL (ref 70–99)
GLUCOSE BLD-MCNC: 96 MG/DL (ref 70–99)
GLUCOSE BLD-MCNC: 99 MG/DL (ref 70–99)
GLUCOSE SERPL-MCNC: 149 MG/DL (ref 74–109)
HCT VFR BLD AUTO: 43.6 % (ref 42–52)
HGB BLD-MCNC: 14 G/DL (ref 14–18)
IMM GRANULOCYTES # BLD: 0.1 K/UL
LYMPHOCYTES # BLD: 0.2 K/UL (ref 1.1–4.5)
LYMPHOCYTES NFR BLD: 1.5 % (ref 20–40)
MCH RBC QN AUTO: 30.9 PG (ref 27–31)
MCHC RBC AUTO-ENTMCNC: 32.1 G/DL (ref 33–37)
MCV RBC AUTO: 96.2 FL (ref 80–94)
MONOCYTES # BLD: 0.5 K/UL (ref 0–0.9)
MONOCYTES NFR BLD: 3.4 % (ref 0–10)
NEUTROPHILS # BLD: 12.6 K/UL (ref 1.5–7.5)
NEUTS SEG NFR BLD: 94.3 % (ref 50–65)
PERFORMED ON: ABNORMAL
PERFORMED ON: ABNORMAL
PERFORMED ON: NORMAL
PERFORMED ON: NORMAL
PLATELET # BLD AUTO: 160 K/UL (ref 130–400)
PMV BLD AUTO: 12.8 FL (ref 9.4–12.4)
POTASSIUM SERPL-SCNC: 4.1 MMOL/L (ref 3.5–5)
PROT SERPL-MCNC: 5.3 G/DL (ref 6.6–8.7)
RBC # BLD AUTO: 4.53 M/UL (ref 4.7–6.1)
REASON FOR REJECTION: NORMAL
REJECTED TEST: NORMAL
SODIUM SERPL-SCNC: 137 MMOL/L (ref 136–145)
VANCOMYCIN SERPL-MCNC: 16.8 UG/ML
WBC # BLD AUTO: 13.4 K/UL (ref 4.8–10.8)

## 2024-02-18 PROCEDURE — 80202 ASSAY OF VANCOMYCIN: CPT

## 2024-02-18 PROCEDURE — 82962 GLUCOSE BLOOD TEST: CPT

## 2024-02-18 PROCEDURE — 85730 THROMBOPLASTIN TIME PARTIAL: CPT

## 2024-02-18 PROCEDURE — 2140000000 HC CCU INTERMEDIATE R&B

## 2024-02-18 PROCEDURE — 6360000002 HC RX W HCPCS: Performed by: NURSE PRACTITIONER

## 2024-02-18 PROCEDURE — 2580000003 HC RX 258: Performed by: NURSE PRACTITIONER

## 2024-02-18 PROCEDURE — 36415 COLL VENOUS BLD VENIPUNCTURE: CPT

## 2024-02-18 PROCEDURE — 80143 DRUG ASSAY ACETAMINOPHEN: CPT

## 2024-02-18 PROCEDURE — 6360000002 HC RX W HCPCS: Performed by: INTERNAL MEDICINE

## 2024-02-18 PROCEDURE — 6370000000 HC RX 637 (ALT 250 FOR IP): Performed by: INTERNAL MEDICINE

## 2024-02-18 PROCEDURE — 99232 SBSQ HOSP IP/OBS MODERATE 35: CPT | Performed by: SURGERY

## 2024-02-18 PROCEDURE — 85025 COMPLETE CBC W/AUTO DIFF WBC: CPT

## 2024-02-18 PROCEDURE — 94640 AIRWAY INHALATION TREATMENT: CPT

## 2024-02-18 PROCEDURE — 6370000000 HC RX 637 (ALT 250 FOR IP): Performed by: NURSE PRACTITIONER

## 2024-02-18 PROCEDURE — 2700000000 HC OXYGEN THERAPY PER DAY

## 2024-02-18 PROCEDURE — 2580000003 HC RX 258: Performed by: INTERNAL MEDICINE

## 2024-02-18 PROCEDURE — 99222 1ST HOSP IP/OBS MODERATE 55: CPT | Performed by: INTERNAL MEDICINE

## 2024-02-18 PROCEDURE — 80053 COMPREHEN METABOLIC PANEL: CPT

## 2024-02-18 RX ADMIN — HEPARIN SODIUM 1910 UNITS: 1000 INJECTION INTRAVENOUS; SUBCUTANEOUS at 03:15

## 2024-02-18 RX ADMIN — ASPIRIN 81 MG 81 MG: 81 TABLET ORAL at 07:53

## 2024-02-18 RX ADMIN — IPRATROPIUM BROMIDE AND ALBUTEROL SULFATE 1 DOSE: 2.5; .5 SOLUTION RESPIRATORY (INHALATION) at 14:13

## 2024-02-18 RX ADMIN — VANCOMYCIN HYDROCHLORIDE 1250 MG: 10 INJECTION, POWDER, LYOPHILIZED, FOR SOLUTION INTRAVENOUS at 11:15

## 2024-02-18 RX ADMIN — SODIUM CHLORIDE: 9 INJECTION, SOLUTION INTRAVENOUS at 11:33

## 2024-02-18 RX ADMIN — METRONIDAZOLE 500 MG: 500 INJECTION, SOLUTION INTRAVENOUS at 01:08

## 2024-02-18 RX ADMIN — WATER 1000 MG: 1 INJECTION INTRAMUSCULAR; INTRAVENOUS; SUBCUTANEOUS at 14:27

## 2024-02-18 RX ADMIN — METRONIDAZOLE 500 MG: 500 INJECTION, SOLUTION INTRAVENOUS at 07:55

## 2024-02-18 RX ADMIN — SODIUM CHLORIDE, PRESERVATIVE FREE 10 ML: 5 INJECTION INTRAVENOUS at 07:54

## 2024-02-18 RX ADMIN — BUDESONIDE INHALATION 500 MCG: 0.5 SUSPENSION RESPIRATORY (INHALATION) at 06:35

## 2024-02-18 RX ADMIN — SODIUM CHLORIDE, PRESERVATIVE FREE 10 ML: 5 INJECTION INTRAVENOUS at 21:33

## 2024-02-18 RX ADMIN — METRONIDAZOLE 500 MG: 500 INJECTION, SOLUTION INTRAVENOUS at 16:54

## 2024-02-18 RX ADMIN — IPRATROPIUM BROMIDE AND ALBUTEROL SULFATE 1 DOSE: 2.5; .5 SOLUTION RESPIRATORY (INHALATION) at 10:17

## 2024-02-18 RX ADMIN — IPRATROPIUM BROMIDE AND ALBUTEROL SULFATE 1 DOSE: 2.5; .5 SOLUTION RESPIRATORY (INHALATION) at 06:35

## 2024-02-18 RX ADMIN — WATER 40 MG: 1 INJECTION INTRAMUSCULAR; INTRAVENOUS; SUBCUTANEOUS at 07:54

## 2024-02-18 RX ADMIN — GUAIFENESIN 600 MG: 600 TABLET ORAL at 07:53

## 2024-02-18 RX ADMIN — HEPARIN SODIUM 1910 UNITS: 1000 INJECTION INTRAVENOUS; SUBCUTANEOUS at 18:34

## 2024-02-18 RX ADMIN — HEPARIN SODIUM 5 UNITS/KG/HR: 10000 INJECTION, SOLUTION INTRAVENOUS at 10:21

## 2024-02-18 RX ADMIN — WATER 40 MG: 1 INJECTION INTRAMUSCULAR; INTRAVENOUS; SUBCUTANEOUS at 21:22

## 2024-02-18 RX ADMIN — HEPARIN SODIUM 7 UNITS/KG/HR: 10000 INJECTION, SOLUTION INTRAVENOUS at 18:35

## 2024-02-18 ASSESSMENT — PAIN SCALES - GENERAL
PAINLEVEL_OUTOF10: 0

## 2024-02-19 LAB
ALBUMIN SERPL-MCNC: 2.9 G/DL (ref 3.5–5.2)
ALP SERPL-CCNC: 93 U/L (ref 40–130)
ALT SERPL-CCNC: 1867 U/L (ref 5–41)
ANION GAP SERPL CALCULATED.3IONS-SCNC: 15 MMOL/L (ref 7–19)
ANTI-XA UNFRAC HEPARIN: <0.1 IU/ML (ref 0.3–0.7)
APTT PPP: 126.4 SEC (ref 26–36.2)
APTT PPP: 66.7 SEC (ref 26–36.2)
AST SERPL-CCNC: 2061 U/L (ref 5–40)
BACTERIA BLD CULT ORG #2: NORMAL
BACTERIA BLD CULT: NORMAL
BACTERIA UR CULT: NORMAL
BASOPHILS # BLD: 0 K/UL (ref 0–0.2)
BASOPHILS NFR BLD: 0.1 % (ref 0–1)
BILIRUB DIRECT SERPL-MCNC: 1.7 MG/DL (ref 0–0.3)
BILIRUB INDIRECT SERPL-MCNC: 0.5 MG/DL (ref 0.1–1)
BILIRUB SERPL-MCNC: 2.2 MG/DL (ref 0.2–1.2)
BUN SERPL-MCNC: 55 MG/DL (ref 8–23)
CALCIUM SERPL-MCNC: 7.4 MG/DL (ref 8.8–10.2)
CHLORIDE SERPL-SCNC: 107 MMOL/L (ref 98–111)
CO2 SERPL-SCNC: 23 MMOL/L (ref 22–29)
CREAT SERPL-MCNC: 1.2 MG/DL (ref 0.5–1.2)
EOSINOPHIL # BLD: 0 K/UL (ref 0–0.6)
EOSINOPHIL NFR BLD: 0 % (ref 0–5)
ERYTHROCYTE [DISTWIDTH] IN BLOOD BY AUTOMATED COUNT: 16.5 % (ref 11.5–14.5)
GLUCOSE BLD-MCNC: 134 MG/DL (ref 70–99)
GLUCOSE BLD-MCNC: 136 MG/DL (ref 70–99)
GLUCOSE BLD-MCNC: 138 MG/DL (ref 70–99)
GLUCOSE BLD-MCNC: 145 MG/DL (ref 70–99)
GLUCOSE SERPL-MCNC: 142 MG/DL (ref 74–109)
HCT VFR BLD AUTO: 42.9 % (ref 42–52)
HGB BLD-MCNC: 13.7 G/DL (ref 14–18)
IMM GRANULOCYTES # BLD: 0.1 K/UL
LYMPHOCYTES # BLD: 0.1 K/UL (ref 1.1–4.5)
LYMPHOCYTES NFR BLD: 0.9 % (ref 20–40)
MCH RBC QN AUTO: 30.3 PG (ref 27–31)
MCHC RBC AUTO-ENTMCNC: 31.9 G/DL (ref 33–37)
MCV RBC AUTO: 94.9 FL (ref 80–94)
MONOCYTES # BLD: 0.4 K/UL (ref 0–0.9)
MONOCYTES NFR BLD: 3.1 % (ref 0–10)
NEUTROPHILS # BLD: 11.3 K/UL (ref 1.5–7.5)
NEUTS SEG NFR BLD: 94.9 % (ref 50–65)
PERFORMED ON: ABNORMAL
PHOSPHATE SERPL-MCNC: 3.2 MG/DL (ref 2.5–4.5)
PLATELET # BLD AUTO: 134 K/UL (ref 130–400)
PMV BLD AUTO: 12 FL (ref 9.4–12.4)
POTASSIUM SERPL-SCNC: 3.2 MMOL/L (ref 3.5–5)
PROT SERPL-MCNC: 5.1 G/DL (ref 6.6–8.7)
RBC # BLD AUTO: 4.52 M/UL (ref 4.7–6.1)
REASON FOR REJECTION: NORMAL
REJECTED TEST: NORMAL
SODIUM SERPL-SCNC: 145 MMOL/L (ref 136–145)
VANCOMYCIN SERPL-MCNC: 18.7 UG/ML
WBC # BLD AUTO: 11.9 K/UL (ref 4.8–10.8)

## 2024-02-19 PROCEDURE — 80202 ASSAY OF VANCOMYCIN: CPT

## 2024-02-19 PROCEDURE — 94760 N-INVAS EAR/PLS OXIMETRY 1: CPT

## 2024-02-19 PROCEDURE — 85520 HEPARIN ASSAY: CPT

## 2024-02-19 PROCEDURE — 2580000003 HC RX 258: Performed by: NURSE PRACTITIONER

## 2024-02-19 PROCEDURE — 6360000002 HC RX W HCPCS: Performed by: NURSE PRACTITIONER

## 2024-02-19 PROCEDURE — 2140000000 HC CCU INTERMEDIATE R&B

## 2024-02-19 PROCEDURE — 6360000002 HC RX W HCPCS: Performed by: INTERNAL MEDICINE

## 2024-02-19 PROCEDURE — 6370000000 HC RX 637 (ALT 250 FOR IP): Performed by: INTERNAL MEDICINE

## 2024-02-19 PROCEDURE — 99232 SBSQ HOSP IP/OBS MODERATE 35: CPT | Performed by: INTERNAL MEDICINE

## 2024-02-19 PROCEDURE — 6370000000 HC RX 637 (ALT 250 FOR IP): Performed by: NURSE PRACTITIONER

## 2024-02-19 PROCEDURE — 84100 ASSAY OF PHOSPHORUS: CPT

## 2024-02-19 PROCEDURE — 2700000000 HC OXYGEN THERAPY PER DAY

## 2024-02-19 PROCEDURE — 97530 THERAPEUTIC ACTIVITIES: CPT

## 2024-02-19 PROCEDURE — 99232 SBSQ HOSP IP/OBS MODERATE 35: CPT

## 2024-02-19 PROCEDURE — 2580000003 HC RX 258: Performed by: INTERNAL MEDICINE

## 2024-02-19 PROCEDURE — 85730 THROMBOPLASTIN TIME PARTIAL: CPT

## 2024-02-19 PROCEDURE — 85025 COMPLETE CBC W/AUTO DIFF WBC: CPT

## 2024-02-19 PROCEDURE — 94640 AIRWAY INHALATION TREATMENT: CPT

## 2024-02-19 PROCEDURE — 97162 PT EVAL MOD COMPLEX 30 MIN: CPT

## 2024-02-19 PROCEDURE — 80076 HEPATIC FUNCTION PANEL: CPT

## 2024-02-19 PROCEDURE — 80048 BASIC METABOLIC PNL TOTAL CA: CPT

## 2024-02-19 PROCEDURE — 82962 GLUCOSE BLOOD TEST: CPT

## 2024-02-19 RX ORDER — HEPARIN SODIUM 10000 [USP'U]/100ML
5-30 INJECTION, SOLUTION INTRAVENOUS CONTINUOUS
Status: DISCONTINUED | OUTPATIENT
Start: 2024-02-19 | End: 2024-02-21

## 2024-02-19 RX ORDER — HEPARIN SODIUM 1000 [USP'U]/ML
60 INJECTION, SOLUTION INTRAVENOUS; SUBCUTANEOUS PRN
Status: DISCONTINUED | OUTPATIENT
Start: 2024-02-19 | End: 2024-02-21

## 2024-02-19 RX ORDER — HEPARIN SODIUM 1000 [USP'U]/ML
30 INJECTION, SOLUTION INTRAVENOUS; SUBCUTANEOUS PRN
Status: DISCONTINUED | OUTPATIENT
Start: 2024-02-19 | End: 2024-02-21

## 2024-02-19 RX ADMIN — GUAIFENESIN 600 MG: 600 TABLET ORAL at 08:57

## 2024-02-19 RX ADMIN — WATER 40 MG: 1 INJECTION INTRAMUSCULAR; INTRAVENOUS; SUBCUTANEOUS at 10:27

## 2024-02-19 RX ADMIN — IPRATROPIUM BROMIDE AND ALBUTEROL SULFATE 1 DOSE: 2.5; .5 SOLUTION RESPIRATORY (INHALATION) at 10:10

## 2024-02-19 RX ADMIN — IPRATROPIUM BROMIDE AND ALBUTEROL SULFATE 1 DOSE: 2.5; .5 SOLUTION RESPIRATORY (INHALATION) at 07:42

## 2024-02-19 RX ADMIN — METRONIDAZOLE 500 MG: 500 INJECTION, SOLUTION INTRAVENOUS at 09:11

## 2024-02-19 RX ADMIN — BUDESONIDE INHALATION 500 MCG: 0.5 SUSPENSION RESPIRATORY (INHALATION) at 07:42

## 2024-02-19 RX ADMIN — HEPARIN SODIUM 6 UNITS/KG/HR: 10000 INJECTION, SOLUTION INTRAVENOUS at 11:12

## 2024-02-19 RX ADMIN — SODIUM CHLORIDE: 9 INJECTION, SOLUTION INTRAVENOUS at 00:30

## 2024-02-19 RX ADMIN — WATER 1000 MG: 1 INJECTION INTRAMUSCULAR; INTRAVENOUS; SUBCUTANEOUS at 14:20

## 2024-02-19 RX ADMIN — HEPARIN SODIUM 1910 UNITS: 1000 INJECTION INTRAVENOUS; SUBCUTANEOUS at 02:03

## 2024-02-19 RX ADMIN — METRONIDAZOLE 500 MG: 500 INJECTION, SOLUTION INTRAVENOUS at 00:29

## 2024-02-19 RX ADMIN — VANCOMYCIN HYDROCHLORIDE 1000 MG: 10 INJECTION, POWDER, LYOPHILIZED, FOR SOLUTION INTRAVENOUS at 10:33

## 2024-02-19 RX ADMIN — POTASSIUM BICARBONATE 40 MEQ: 782 TABLET, EFFERVESCENT ORAL at 10:27

## 2024-02-19 RX ADMIN — SODIUM CHLORIDE, PRESERVATIVE FREE 10 ML: 5 INJECTION INTRAVENOUS at 10:33

## 2024-02-19 RX ADMIN — HEPARIN SODIUM 3810 UNITS: 1000 INJECTION INTRAVENOUS; SUBCUTANEOUS at 18:05

## 2024-02-19 RX ADMIN — SODIUM CHLORIDE, PRESERVATIVE FREE 10 ML: 5 INJECTION INTRAVENOUS at 20:14

## 2024-02-19 RX ADMIN — WATER 40 MG: 1 INJECTION INTRAMUSCULAR; INTRAVENOUS; SUBCUTANEOUS at 20:13

## 2024-02-19 RX ADMIN — IPRATROPIUM BROMIDE AND ALBUTEROL SULFATE 1 DOSE: 2.5; .5 SOLUTION RESPIRATORY (INHALATION) at 14:16

## 2024-02-19 RX ADMIN — ASPIRIN 81 MG 81 MG: 81 TABLET ORAL at 08:57

## 2024-02-19 RX ADMIN — METRONIDAZOLE 500 MG: 500 INJECTION, SOLUTION INTRAVENOUS at 17:16

## 2024-02-20 ENCOUNTER — APPOINTMENT (OUTPATIENT)
Dept: ULTRASOUND IMAGING | Age: 69
DRG: 190 | End: 2024-02-20
Payer: MEDICARE

## 2024-02-20 LAB
ALBUMIN SERPL-MCNC: 3.2 G/DL (ref 3.5–5.2)
ALP SERPL-CCNC: 90 U/L (ref 40–130)
ALT SERPL-CCNC: 1557 U/L (ref 5–41)
ANION GAP SERPL CALCULATED.3IONS-SCNC: 12 MMOL/L (ref 7–19)
ANTI-XA UNFRAC HEPARIN: 0.28 IU/ML (ref 0.3–0.7)
ANTI-XA UNFRAC HEPARIN: 0.36 IU/ML (ref 0.3–0.7)
ANTI-XA UNFRAC HEPARIN: 0.44 IU/ML (ref 0.3–0.7)
AST SERPL-CCNC: 830 U/L (ref 5–40)
BASOPHILS # BLD: 0 K/UL (ref 0–0.2)
BASOPHILS NFR BLD: 0.1 % (ref 0–1)
BILIRUB DIRECT SERPL-MCNC: 1.6 MG/DL (ref 0–0.3)
BILIRUB INDIRECT SERPL-MCNC: 0.6 MG/DL (ref 0.1–1)
BILIRUB SERPL-MCNC: 2.2 MG/DL (ref 0.2–1.2)
BUN SERPL-MCNC: 47 MG/DL (ref 8–23)
CALCIUM SERPL-MCNC: 7.5 MG/DL (ref 8.8–10.2)
CHLORIDE SERPL-SCNC: 108 MMOL/L (ref 98–111)
CO2 SERPL-SCNC: 24 MMOL/L (ref 22–29)
CREAT SERPL-MCNC: 1 MG/DL (ref 0.5–1.2)
EKG P AXIS: 71 DEGREES
EKG P-R INTERVAL: 182 MS
EKG Q-T INTERVAL: 316 MS
EKG QRS DURATION: 76 MS
EKG QTC CALCULATION (BAZETT): 401 MS
EKG T AXIS: 47 DEGREES
EOSINOPHIL # BLD: 0 K/UL (ref 0–0.6)
EOSINOPHIL NFR BLD: 0 % (ref 0–5)
ERYTHROCYTE [DISTWIDTH] IN BLOOD BY AUTOMATED COUNT: 17.1 % (ref 11.5–14.5)
GLUCOSE BLD-MCNC: 102 MG/DL (ref 70–99)
GLUCOSE BLD-MCNC: 123 MG/DL (ref 70–99)
GLUCOSE BLD-MCNC: 140 MG/DL (ref 70–99)
GLUCOSE BLD-MCNC: 145 MG/DL (ref 70–99)
GLUCOSE SERPL-MCNC: 149 MG/DL (ref 74–109)
HCT VFR BLD AUTO: 45.2 % (ref 42–52)
HGB BLD-MCNC: 14.7 G/DL (ref 14–18)
IMM GRANULOCYTES # BLD: 0.1 K/UL
LYMPHOCYTES # BLD: 0.2 K/UL (ref 1.1–4.5)
LYMPHOCYTES NFR BLD: 1.6 % (ref 20–40)
MCH RBC QN AUTO: 30.8 PG (ref 27–31)
MCHC RBC AUTO-ENTMCNC: 32.5 G/DL (ref 33–37)
MCV RBC AUTO: 94.6 FL (ref 80–94)
MONOCYTES # BLD: 0.5 K/UL (ref 0–0.9)
MONOCYTES NFR BLD: 3.8 % (ref 0–10)
NEUTROPHILS # BLD: 12.5 K/UL (ref 1.5–7.5)
NEUTS SEG NFR BLD: 93.5 % (ref 50–65)
PERFORMED ON: ABNORMAL
PLATELET # BLD AUTO: 140 K/UL (ref 130–400)
PMV BLD AUTO: 11.8 FL (ref 9.4–12.4)
POTASSIUM SERPL-SCNC: 3.6 MMOL/L (ref 3.5–5)
PROT SERPL-MCNC: 5.6 G/DL (ref 6.6–8.7)
RBC # BLD AUTO: 4.78 M/UL (ref 4.7–6.1)
SODIUM SERPL-SCNC: 144 MMOL/L (ref 136–145)
VANCOMYCIN SERPL-MCNC: 22.1 UG/ML
WBC # BLD AUTO: 13.4 K/UL (ref 4.8–10.8)

## 2024-02-20 PROCEDURE — 6360000002 HC RX W HCPCS: Performed by: INTERNAL MEDICINE

## 2024-02-20 PROCEDURE — 82962 GLUCOSE BLOOD TEST: CPT

## 2024-02-20 PROCEDURE — 94760 N-INVAS EAR/PLS OXIMETRY 1: CPT

## 2024-02-20 PROCEDURE — 80048 BASIC METABOLIC PNL TOTAL CA: CPT

## 2024-02-20 PROCEDURE — 6370000000 HC RX 637 (ALT 250 FOR IP): Performed by: INTERNAL MEDICINE

## 2024-02-20 PROCEDURE — 6370000000 HC RX 637 (ALT 250 FOR IP): Performed by: NURSE PRACTITIONER

## 2024-02-20 PROCEDURE — 94640 AIRWAY INHALATION TREATMENT: CPT

## 2024-02-20 PROCEDURE — 6360000002 HC RX W HCPCS: Performed by: NURSE PRACTITIONER

## 2024-02-20 PROCEDURE — 80076 HEPATIC FUNCTION PANEL: CPT

## 2024-02-20 PROCEDURE — 85025 COMPLETE CBC W/AUTO DIFF WBC: CPT

## 2024-02-20 PROCEDURE — 2580000003 HC RX 258: Performed by: NURSE PRACTITIONER

## 2024-02-20 PROCEDURE — 85520 HEPARIN ASSAY: CPT

## 2024-02-20 PROCEDURE — 99231 SBSQ HOSP IP/OBS SF/LOW 25: CPT

## 2024-02-20 PROCEDURE — 80202 ASSAY OF VANCOMYCIN: CPT

## 2024-02-20 PROCEDURE — 76705 ECHO EXAM OF ABDOMEN: CPT

## 2024-02-20 PROCEDURE — 2580000003 HC RX 258: Performed by: INTERNAL MEDICINE

## 2024-02-20 PROCEDURE — 2140000000 HC CCU INTERMEDIATE R&B

## 2024-02-20 PROCEDURE — 2700000000 HC OXYGEN THERAPY PER DAY

## 2024-02-20 RX ADMIN — SODIUM CHLORIDE: 9 INJECTION, SOLUTION INTRAVENOUS at 02:40

## 2024-02-20 RX ADMIN — METRONIDAZOLE 500 MG: 500 INJECTION, SOLUTION INTRAVENOUS at 09:25

## 2024-02-20 RX ADMIN — GUAIFENESIN 600 MG: 600 TABLET ORAL at 09:59

## 2024-02-20 RX ADMIN — WATER 40 MG: 1 INJECTION INTRAMUSCULAR; INTRAVENOUS; SUBCUTANEOUS at 09:59

## 2024-02-20 RX ADMIN — IPRATROPIUM BROMIDE AND ALBUTEROL SULFATE 1 DOSE: 2.5; .5 SOLUTION RESPIRATORY (INHALATION) at 06:22

## 2024-02-20 RX ADMIN — IPRATROPIUM BROMIDE AND ALBUTEROL SULFATE 1 DOSE: 2.5; .5 SOLUTION RESPIRATORY (INHALATION) at 14:16

## 2024-02-20 RX ADMIN — NITROGLYCERIN 0.5 INCH: 20 OINTMENT TOPICAL at 23:53

## 2024-02-20 RX ADMIN — IPRATROPIUM BROMIDE AND ALBUTEROL SULFATE 1 DOSE: 2.5; .5 SOLUTION RESPIRATORY (INHALATION) at 10:15

## 2024-02-20 RX ADMIN — ASPIRIN 81 MG 81 MG: 81 TABLET ORAL at 09:59

## 2024-02-20 RX ADMIN — NITROGLYCERIN 0.5 INCH: 20 OINTMENT TOPICAL at 13:37

## 2024-02-20 RX ADMIN — NITROGLYCERIN 0.5 INCH: 20 OINTMENT TOPICAL at 17:52

## 2024-02-20 RX ADMIN — SODIUM CHLORIDE, PRESERVATIVE FREE 10 ML: 5 INJECTION INTRAVENOUS at 09:59

## 2024-02-20 RX ADMIN — HEPARIN SODIUM 10 UNITS/KG/HR: 10000 INJECTION, SOLUTION INTRAVENOUS at 13:33

## 2024-02-20 RX ADMIN — BUDESONIDE INHALATION 500 MCG: 0.5 SUSPENSION RESPIRATORY (INHALATION) at 06:22

## 2024-02-20 RX ADMIN — METRONIDAZOLE 500 MG: 500 INJECTION, SOLUTION INTRAVENOUS at 00:40

## 2024-02-20 RX ADMIN — HEPARIN SODIUM 1910 UNITS: 1000 INJECTION INTRAVENOUS; SUBCUTANEOUS at 21:07

## 2024-02-20 RX ADMIN — WATER 40 MG: 1 INJECTION INTRAMUSCULAR; INTRAVENOUS; SUBCUTANEOUS at 20:59

## 2024-02-20 NOTE — PLAN OF CARE
Problem: Nutrition Deficit:  Goal: Optimize nutritional status  Outcome: Progressing     Problem: Discharge Planning  Goal: Discharge to home or other facility with appropriate resources  Outcome: Progressing     Problem: ABCDS Injury Assessment  Goal: Absence of physical injury  Outcome: Progressing     Problem: Pain  Goal: Verbalizes/displays adequate comfort level or baseline comfort level  Outcome: Progressing     Problem: Confusion  Goal: Confusion, delirium, dementia, or psychosis is improved or at baseline  Description: INTERVENTIONS:  1. Assess for possible contributors to thought disturbance, including medications, impaired vision or hearing, underlying metabolic abnormalities, dehydration, psychiatric diagnoses, and notify attending LIP  2. [place] high risk fall precautions, as indicated  3. Provide frequent short contacts to provide reality reorientation, refocusing and direction  4. Decrease environmental stimuli, including noise as appropriate  5. Monitor and intervene to maintain adequate nutrition, hydration, elimination, sleep and activity  6. If unable to ensure safety without constant attention obtain sitter and review sitter guidelines with assigned personnel  7. Initiate Psychosocial CNS and Spiritual Care consult, as indicated  Outcome: Progressing

## 2024-02-20 NOTE — PLAN OF CARE
Problem: Skin/Tissue Integrity  Goal: Absence of new skin breakdown  Description: 1.  Monitor for areas of redness and/or skin breakdown  2.  Assess vascular access sites hourly  3.  Every 4-6 hours minimum:  Change oxygen saturation probe site  4.  Every 4-6 hours:  If on nasal continuous positive airway pressure, respiratory therapy assess nares and determine need for appliance change or resting period.  Outcome: Progressing     Problem: Safety - Adult  Goal: Free from fall injury  Outcome: Progressing     Problem: Risk for Elopement  Goal: Patient will not exit the unit/facility without proper excort  Outcome: Progressing  Flowsheets  Taken 2/19/2024 2206 by Margie Smart, RN  Nursing Interventions for Elopement Risk: Assist with personal care needs such as toileting, eating, dressing, as needed to reduce the risk of wandering  Taken 2/19/2024 1506 by Viet Wu, RN  Nursing Interventions for Elopement Risk: Assist with personal care needs such as toileting, eating, dressing, as needed to reduce the risk of wandering     Problem: Chronic Conditions and Co-morbidities  Goal: Patient's chronic conditions and co-morbidity symptoms are monitored and maintained or improved  Outcome: Progressing     Problem: Nutrition Deficit:  Goal: Optimize nutritional status  Outcome: Progressing     Problem: Discharge Planning  Goal: Discharge to home or other facility with appropriate resources  Outcome: Progressing     Problem: ABCDS Injury Assessment  Goal: Absence of physical injury  Outcome: Progressing     Problem: Pain  Goal: Verbalizes/displays adequate comfort level or baseline comfort level  Outcome: Progressing     Problem: Confusion  Goal: Confusion, delirium, dementia, or psychosis is improved or at baseline  Description: INTERVENTIONS:  1. Assess for possible contributors to thought disturbance, including medications, impaired vision or hearing, underlying metabolic abnormalities, dehydration, psychiatric

## 2024-02-21 ENCOUNTER — APPOINTMENT (OUTPATIENT)
Dept: GENERAL RADIOLOGY | Age: 69
DRG: 190 | End: 2024-02-21
Payer: MEDICARE

## 2024-02-21 LAB
ALBUMIN SERPL-MCNC: 3.3 G/DL (ref 3.5–5.2)
ALP SERPL-CCNC: 87 U/L (ref 40–130)
ALT SERPL-CCNC: 1167 U/L (ref 5–41)
ANION GAP SERPL CALCULATED.3IONS-SCNC: 13 MMOL/L (ref 7–19)
ANTI-XA UNFRAC HEPARIN: 0.6 IU/ML (ref 0.3–0.7)
ANTI-XA UNFRAC HEPARIN: 0.61 IU/ML (ref 0.3–0.7)
AST SERPL-CCNC: 344 U/L (ref 5–40)
BASOPHILS # BLD: 0 K/UL (ref 0–0.2)
BASOPHILS NFR BLD: 0.1 % (ref 0–1)
BILIRUB DIRECT SERPL-MCNC: 1.8 MG/DL (ref 0–0.3)
BILIRUB INDIRECT SERPL-MCNC: 0.7 MG/DL (ref 0.1–1)
BILIRUB SERPL-MCNC: 2.5 MG/DL (ref 0.2–1.2)
BUN SERPL-MCNC: 48 MG/DL (ref 8–23)
CALCIUM SERPL-MCNC: 7.8 MG/DL (ref 8.8–10.2)
CHLORIDE SERPL-SCNC: 110 MMOL/L (ref 98–111)
CO2 SERPL-SCNC: 23 MMOL/L (ref 22–29)
CREAT SERPL-MCNC: 1.1 MG/DL (ref 0.5–1.2)
EOSINOPHIL # BLD: 0 K/UL (ref 0–0.6)
EOSINOPHIL NFR BLD: 0 % (ref 0–5)
ERYTHROCYTE [DISTWIDTH] IN BLOOD BY AUTOMATED COUNT: 17.2 % (ref 11.5–14.5)
GLUCOSE BLD-MCNC: 123 MG/DL (ref 70–99)
GLUCOSE BLD-MCNC: 150 MG/DL (ref 70–99)
GLUCOSE BLD-MCNC: 156 MG/DL (ref 70–99)
GLUCOSE SERPL-MCNC: 151 MG/DL (ref 74–109)
HCT VFR BLD AUTO: 44.5 % (ref 42–52)
HGB BLD-MCNC: 14.3 G/DL (ref 14–18)
IMM GRANULOCYTES # BLD: 0.1 K/UL
LYMPHOCYTES # BLD: 0.3 K/UL (ref 1.1–4.5)
LYMPHOCYTES NFR BLD: 2.1 % (ref 20–40)
MCH RBC QN AUTO: 30.4 PG (ref 27–31)
MCHC RBC AUTO-ENTMCNC: 32.1 G/DL (ref 33–37)
MCV RBC AUTO: 94.7 FL (ref 80–94)
MONOCYTES # BLD: 0.6 K/UL (ref 0–0.9)
MONOCYTES NFR BLD: 4.3 % (ref 0–10)
NEUTROPHILS # BLD: 12.6 K/UL (ref 1.5–7.5)
NEUTS SEG NFR BLD: 92.5 % (ref 50–65)
PERFORMED ON: ABNORMAL
PLATELET # BLD AUTO: 122 K/UL (ref 130–400)
PMV BLD AUTO: 12.1 FL (ref 9.4–12.4)
POTASSIUM SERPL-SCNC: 3.7 MMOL/L (ref 3.5–5)
PROT SERPL-MCNC: 5.3 G/DL (ref 6.6–8.7)
RBC # BLD AUTO: 4.7 M/UL (ref 4.7–6.1)
SODIUM SERPL-SCNC: 146 MMOL/L (ref 136–145)
WBC # BLD AUTO: 13.6 K/UL (ref 4.8–10.8)

## 2024-02-21 PROCEDURE — 80048 BASIC METABOLIC PNL TOTAL CA: CPT

## 2024-02-21 PROCEDURE — 2140000000 HC CCU INTERMEDIATE R&B

## 2024-02-21 PROCEDURE — 6370000000 HC RX 637 (ALT 250 FOR IP): Performed by: NURSE PRACTITIONER

## 2024-02-21 PROCEDURE — 51798 US URINE CAPACITY MEASURE: CPT

## 2024-02-21 PROCEDURE — 80076 HEPATIC FUNCTION PANEL: CPT

## 2024-02-21 PROCEDURE — 6370000000 HC RX 637 (ALT 250 FOR IP): Performed by: INTERNAL MEDICINE

## 2024-02-21 PROCEDURE — 99233 SBSQ HOSP IP/OBS HIGH 50: CPT

## 2024-02-21 PROCEDURE — 82962 GLUCOSE BLOOD TEST: CPT

## 2024-02-21 PROCEDURE — 6360000002 HC RX W HCPCS: Performed by: INTERNAL MEDICINE

## 2024-02-21 PROCEDURE — 85025 COMPLETE CBC W/AUTO DIFF WBC: CPT

## 2024-02-21 PROCEDURE — 6360000002 HC RX W HCPCS: Performed by: NURSE PRACTITIONER

## 2024-02-21 PROCEDURE — 2700000000 HC OXYGEN THERAPY PER DAY

## 2024-02-21 PROCEDURE — 94640 AIRWAY INHALATION TREATMENT: CPT

## 2024-02-21 PROCEDURE — 85520 HEPARIN ASSAY: CPT

## 2024-02-21 PROCEDURE — 93005 ELECTROCARDIOGRAM TRACING: CPT | Performed by: NURSE PRACTITIONER

## 2024-02-21 PROCEDURE — 2580000003 HC RX 258: Performed by: NURSE PRACTITIONER

## 2024-02-21 PROCEDURE — 97530 THERAPEUTIC ACTIVITIES: CPT

## 2024-02-21 PROCEDURE — 2580000003 HC RX 258: Performed by: INTERNAL MEDICINE

## 2024-02-21 RX ORDER — CLOPIDOGREL BISULFATE 75 MG/1
75 TABLET ORAL DAILY
Status: DISCONTINUED | OUTPATIENT
Start: 2024-02-21 | End: 2024-02-25 | Stop reason: HOSPADM

## 2024-02-21 RX ADMIN — ASPIRIN 81 MG 81 MG: 81 TABLET ORAL at 08:31

## 2024-02-21 RX ADMIN — IPRATROPIUM BROMIDE 0.5 MG: 0.5 SOLUTION RESPIRATORY (INHALATION) at 14:12

## 2024-02-21 RX ADMIN — NITROGLYCERIN 0.5 INCH: 20 OINTMENT TOPICAL at 11:01

## 2024-02-21 RX ADMIN — GUAIFENESIN 600 MG: 600 TABLET ORAL at 08:31

## 2024-02-21 RX ADMIN — NITROGLYCERIN 0.5 INCH: 20 OINTMENT TOPICAL at 05:08

## 2024-02-21 RX ADMIN — IPRATROPIUM BROMIDE AND ALBUTEROL SULFATE 1 DOSE: 2.5; .5 SOLUTION RESPIRATORY (INHALATION) at 10:10

## 2024-02-21 RX ADMIN — SODIUM CHLORIDE, PRESERVATIVE FREE 10 ML: 5 INJECTION INTRAVENOUS at 08:32

## 2024-02-21 RX ADMIN — IPRATROPIUM BROMIDE AND ALBUTEROL SULFATE 1 DOSE: 2.5; .5 SOLUTION RESPIRATORY (INHALATION) at 06:12

## 2024-02-21 RX ADMIN — BUDESONIDE INHALATION 500 MCG: 0.5 SUSPENSION RESPIRATORY (INHALATION) at 06:12

## 2024-02-21 RX ADMIN — NITROGLYCERIN 0.5 INCH: 20 OINTMENT TOPICAL at 17:17

## 2024-02-21 RX ADMIN — CLOPIDOGREL BISULFATE 75 MG: 75 TABLET ORAL at 12:39

## 2024-02-21 RX ADMIN — WATER 40 MG: 1 INJECTION INTRAMUSCULAR; INTRAVENOUS; SUBCUTANEOUS at 08:43

## 2024-02-22 LAB
ALBUMIN SERPL-MCNC: 2.9 G/DL (ref 3.5–5.2)
ALP SERPL-CCNC: 87 U/L (ref 40–130)
ALT SERPL-CCNC: 753 U/L (ref 5–41)
ANION GAP SERPL CALCULATED.3IONS-SCNC: 13 MMOL/L (ref 7–19)
AST SERPL-CCNC: 125 U/L (ref 5–40)
BACTERIA BLD CULT ORG #2: NORMAL
BACTERIA BLD CULT: NORMAL
BASOPHILS # BLD: 0.1 K/UL (ref 0–0.2)
BASOPHILS NFR BLD: 0.4 % (ref 0–1)
BILIRUB DIRECT SERPL-MCNC: 2 MG/DL (ref 0–0.3)
BILIRUB INDIRECT SERPL-MCNC: 0.9 MG/DL (ref 0.1–1)
BILIRUB SERPL-MCNC: 2.9 MG/DL (ref 0.2–1.2)
BUN SERPL-MCNC: 57 MG/DL (ref 8–23)
CALCIUM SERPL-MCNC: 7.9 MG/DL (ref 8.8–10.2)
CHLORIDE SERPL-SCNC: 110 MMOL/L (ref 98–111)
CO2 SERPL-SCNC: 21 MMOL/L (ref 22–29)
CREAT SERPL-MCNC: 1 MG/DL (ref 0.5–1.2)
EKG P AXIS: 55 DEGREES
EKG P-R INTERVAL: 148 MS
EKG Q-T INTERVAL: 326 MS
EKG QRS DURATION: 84 MS
EKG QTC CALCULATION (BAZETT): 413 MS
EKG T AXIS: 11 DEGREES
EOSINOPHIL # BLD: 0 K/UL (ref 0–0.6)
EOSINOPHIL NFR BLD: 0 % (ref 0–5)
ERYTHROCYTE [DISTWIDTH] IN BLOOD BY AUTOMATED COUNT: 18.1 % (ref 11.5–14.5)
GLUCOSE BLD-MCNC: 113 MG/DL (ref 70–99)
GLUCOSE BLD-MCNC: 125 MG/DL (ref 70–99)
GLUCOSE BLD-MCNC: 129 MG/DL (ref 70–99)
GLUCOSE BLD-MCNC: 135 MG/DL (ref 70–99)
GLUCOSE SERPL-MCNC: 148 MG/DL (ref 74–109)
HCT VFR BLD AUTO: 44.5 % (ref 42–52)
HGB BLD-MCNC: 14.5 G/DL (ref 14–18)
IMM GRANULOCYTES # BLD: 0.2 K/UL
LYMPHOCYTES # BLD: 0.4 K/UL (ref 1.1–4.5)
LYMPHOCYTES NFR BLD: 2.7 % (ref 20–40)
MCH RBC QN AUTO: 30 PG (ref 27–31)
MCHC RBC AUTO-ENTMCNC: 32.6 G/DL (ref 33–37)
MCV RBC AUTO: 92.1 FL (ref 80–94)
MONOCYTES # BLD: 0.9 K/UL (ref 0–0.9)
MONOCYTES NFR BLD: 5.6 % (ref 0–10)
NEUTROPHILS # BLD: 13.8 K/UL (ref 1.5–7.5)
NEUTS SEG NFR BLD: 89.9 % (ref 50–65)
PERFORMED ON: ABNORMAL
PHOSPHATE SERPL-MCNC: 2.7 MG/DL (ref 2.5–4.5)
PLATELET # BLD AUTO: 126 K/UL (ref 130–400)
PMV BLD AUTO: 12.1 FL (ref 9.4–12.4)
POTASSIUM SERPL-SCNC: 4.2 MMOL/L (ref 3.5–5)
PROT SERPL-MCNC: 5.5 G/DL (ref 6.6–8.7)
RBC # BLD AUTO: 4.83 M/UL (ref 4.7–6.1)
REASON FOR REJECTION: NORMAL
REJECTED TEST: NORMAL
SODIUM SERPL-SCNC: 144 MMOL/L (ref 136–145)
TRIGL SERPL-MCNC: 88 MG/DL (ref 0–149)
WBC # BLD AUTO: 15.3 K/UL (ref 4.8–10.8)

## 2024-02-22 PROCEDURE — 2580000003 HC RX 258: Performed by: NURSE PRACTITIONER

## 2024-02-22 PROCEDURE — 6360000002 HC RX W HCPCS: Performed by: NURSE PRACTITIONER

## 2024-02-22 PROCEDURE — 99497 ADVNCD CARE PLAN 30 MIN: CPT

## 2024-02-22 PROCEDURE — 99233 SBSQ HOSP IP/OBS HIGH 50: CPT

## 2024-02-22 PROCEDURE — 6370000000 HC RX 637 (ALT 250 FOR IP): Performed by: INTERNAL MEDICINE

## 2024-02-22 PROCEDURE — 36415 COLL VENOUS BLD VENIPUNCTURE: CPT

## 2024-02-22 PROCEDURE — 6370000000 HC RX 637 (ALT 250 FOR IP): Performed by: HOSPITALIST

## 2024-02-22 PROCEDURE — 2700000000 HC OXYGEN THERAPY PER DAY

## 2024-02-22 PROCEDURE — 84478 ASSAY OF TRIGLYCERIDES: CPT

## 2024-02-22 PROCEDURE — 93010 ELECTROCARDIOGRAM REPORT: CPT | Performed by: INTERNAL MEDICINE

## 2024-02-22 PROCEDURE — 6370000000 HC RX 637 (ALT 250 FOR IP): Performed by: NURSE PRACTITIONER

## 2024-02-22 PROCEDURE — 85025 COMPLETE CBC W/AUTO DIFF WBC: CPT

## 2024-02-22 PROCEDURE — 82962 GLUCOSE BLOOD TEST: CPT

## 2024-02-22 PROCEDURE — 2140000000 HC CCU INTERMEDIATE R&B

## 2024-02-22 PROCEDURE — 94640 AIRWAY INHALATION TREATMENT: CPT

## 2024-02-22 PROCEDURE — 80048 BASIC METABOLIC PNL TOTAL CA: CPT

## 2024-02-22 PROCEDURE — 84100 ASSAY OF PHOSPHORUS: CPT

## 2024-02-22 PROCEDURE — 80076 HEPATIC FUNCTION PANEL: CPT

## 2024-02-22 PROCEDURE — 94760 N-INVAS EAR/PLS OXIMETRY 1: CPT

## 2024-02-22 RX ORDER — MORPHINE SULFATE 20 MG/ML
5 SOLUTION ORAL
Status: DISCONTINUED | OUTPATIENT
Start: 2024-02-22 | End: 2024-02-25 | Stop reason: HOSPADM

## 2024-02-22 RX ORDER — MORPHINE SULFATE 20 MG/ML
5 SOLUTION ORAL EVERY 4 HOURS PRN
Status: DISCONTINUED | OUTPATIENT
Start: 2024-02-22 | End: 2024-02-22

## 2024-02-22 RX ORDER — LORAZEPAM 2 MG/ML
1 CONCENTRATE ORAL EVERY 6 HOURS PRN
Status: DISCONTINUED | OUTPATIENT
Start: 2024-02-22 | End: 2024-02-24

## 2024-02-22 RX ORDER — MORPHINE SULFATE 2 MG/ML
2 INJECTION, SOLUTION INTRAMUSCULAR; INTRAVENOUS EVERY 4 HOURS PRN
Status: DISCONTINUED | OUTPATIENT
Start: 2024-02-22 | End: 2024-02-25 | Stop reason: HOSPADM

## 2024-02-22 RX ORDER — GLYCOPYRROLATE 0.2 MG/ML
0.2 INJECTION INTRAMUSCULAR; INTRAVENOUS EVERY 6 HOURS PRN
Status: DISCONTINUED | OUTPATIENT
Start: 2024-02-22 | End: 2024-02-25 | Stop reason: HOSPADM

## 2024-02-22 RX ADMIN — IPRATROPIUM BROMIDE 0.5 MG: 0.5 SOLUTION RESPIRATORY (INHALATION) at 10:18

## 2024-02-22 RX ADMIN — IPRATROPIUM BROMIDE 0.5 MG: 0.5 SOLUTION RESPIRATORY (INHALATION) at 14:09

## 2024-02-22 RX ADMIN — IPRATROPIUM BROMIDE 0.5 MG: 0.5 SOLUTION RESPIRATORY (INHALATION) at 06:17

## 2024-02-22 RX ADMIN — BUDESONIDE INHALATION 500 MCG: 0.5 SUSPENSION RESPIRATORY (INHALATION) at 06:16

## 2024-02-22 RX ADMIN — CLOPIDOGREL BISULFATE 75 MG: 75 TABLET ORAL at 09:12

## 2024-02-22 RX ADMIN — ASPIRIN 81 MG 81 MG: 81 TABLET ORAL at 09:12

## 2024-02-22 RX ADMIN — NITROGLYCERIN 0.5 INCH: 20 OINTMENT TOPICAL at 11:29

## 2024-02-22 RX ADMIN — NITROGLYCERIN 0.5 INCH: 20 OINTMENT TOPICAL at 17:21

## 2024-02-22 RX ADMIN — NITROGLYCERIN 0.5 INCH: 20 OINTMENT TOPICAL at 00:44

## 2024-02-22 RX ADMIN — NITROGLYCERIN 0.5 INCH: 20 OINTMENT TOPICAL at 23:42

## 2024-02-22 RX ADMIN — METOPROLOL TARTRATE 25 MG: 25 TABLET, FILM COATED ORAL at 09:12

## 2024-02-22 RX ADMIN — WATER 40 MG: 1 INJECTION INTRAMUSCULAR; INTRAVENOUS; SUBCUTANEOUS at 09:12

## 2024-02-22 RX ADMIN — NITROGLYCERIN 0.5 INCH: 20 OINTMENT TOPICAL at 05:35

## 2024-02-22 RX ADMIN — GUAIFENESIN 600 MG: 600 TABLET ORAL at 09:11

## 2024-02-22 RX ADMIN — SODIUM CHLORIDE, PRESERVATIVE FREE 10 ML: 5 INJECTION INTRAVENOUS at 09:12

## 2024-02-22 RX ADMIN — IPRATROPIUM BROMIDE 0.5 MG: 0.5 SOLUTION RESPIRATORY (INHALATION) at 20:47

## 2024-02-22 RX ADMIN — BUDESONIDE INHALATION 500 MCG: 0.5 SUSPENSION RESPIRATORY (INHALATION) at 20:47

## 2024-02-22 NOTE — ACP (ADVANCE CARE PLANNING)
Advance Care Planning     Advance Care Planning (ACP) Physician/NP/PA (Provider) Conversation      Date of ACP Conversation: 2/22/2024    Conversation Conducted with: Pts spouse at bedside    Health Care Decision Maker:  Current Designated Health Care Decision Maker:     Primary Decision Maker: Lexus Oviedo - Spouse - [phone]    Care Preferences:  Ventilation:  \"If you were in your present state of health and suddenly became very ill and were unable to breathe on your own, what would your preference be about the use of a ventilator (breathing machine) if it was available to you?\"    NO    Resuscitation:  \"CPR works best to restart the heart when there is a sudden event, like a heart attack, in someone who is otherwise healthy. Unfortunately, CPR does not typically restart the heart for people who have serious health conditions or who are very sick.\"    \"In the event your heart stopped as a result of an underlying serious health condition, would you want attempts to be made to restart your heart (answer \"yes\" for attempt to resuscitate) or would you prefer a natural death (answer \"no\" for do not attempt to resuscitate)?\"   NO    Conversation Outcomes / Follow-Up Plan:   Family meeting conducted with patient's spouse and attending physician at bedside.  Discussed current status and plan of care. Reviewed code status and provided further education on meaning of \"full code\" versus \"DNR\".  Also additional discussions regarding hospice services today and spouse considering transition to full comfort measures.  Request to continue current level of medical care while assessing for potential outpatient hospice options but patient is to be a DNR in the event of cardiac or respiratory arrest.  Agreeable for prn medications for comfort if pt should develop symptom management concerns.  Code status order updated.    Length of Voluntary ACP Conversation in minutes:  17 minutes    Natalie Villa, APRN - CNP          
visit    Natalie Villa, APRN - CNP

## 2024-02-23 LAB
ANION GAP SERPL CALCULATED.3IONS-SCNC: 14 MMOL/L (ref 7–19)
ANISOCYTOSIS BLD QL SMEAR: ABNORMAL
BASOPHILS # BLD: 0 K/UL (ref 0–0.2)
BASOPHILS NFR BLD: 0 % (ref 0–1)
BUN SERPL-MCNC: 59 MG/DL (ref 8–23)
CALCIUM SERPL-MCNC: 7.9 MG/DL (ref 8.8–10.2)
CHLORIDE SERPL-SCNC: 112 MMOL/L (ref 98–111)
CO2 SERPL-SCNC: 21 MMOL/L (ref 22–29)
CREAT SERPL-MCNC: 1.1 MG/DL (ref 0.5–1.2)
DACRYOCYTES BLD QL SMEAR: ABNORMAL
EOSINOPHIL # BLD: 0 K/UL (ref 0–0.6)
EOSINOPHIL NFR BLD: 0 % (ref 0–5)
ERYTHROCYTE [DISTWIDTH] IN BLOOD BY AUTOMATED COUNT: 18.6 % (ref 11.5–14.5)
GLUCOSE BLD-MCNC: 107 MG/DL (ref 70–99)
GLUCOSE SERPL-MCNC: 139 MG/DL (ref 74–109)
HCT VFR BLD AUTO: 46.6 % (ref 42–52)
HGB BLD-MCNC: 14.8 G/DL (ref 14–18)
IMM GRANULOCYTES # BLD: 0.2 K/UL
LYMPHOCYTES # BLD: 1.5 K/UL (ref 1.1–4.5)
LYMPHOCYTES NFR BLD: 9 % (ref 20–40)
MACROCYTES BLD QL SMEAR: ABNORMAL
MCH RBC QN AUTO: 30.1 PG (ref 27–31)
MCHC RBC AUTO-ENTMCNC: 31.8 G/DL (ref 33–37)
MCV RBC AUTO: 94.9 FL (ref 80–94)
MONOCYTES # BLD: 0.6 K/UL (ref 0–0.9)
MONOCYTES NFR BLD: 4 % (ref 0–10)
NEUTROPHILS # BLD: 12.5 K/UL (ref 1.5–7.5)
NEUTS SEG NFR BLD: 86 % (ref 50–65)
PERFORMED ON: ABNORMAL
PHOSPHATE SERPL-MCNC: 2.6 MG/DL (ref 2.5–4.5)
PLATELET # BLD AUTO: 85 K/UL (ref 130–400)
PLATELET SLIDE REVIEW: ABNORMAL
PMV BLD AUTO: 13.5 FL (ref 9.4–12.4)
POIKILOCYTOSIS BLD QL SMEAR: ABNORMAL
POTASSIUM SERPL-SCNC: 4.6 MMOL/L (ref 3.5–5)
RBC # BLD AUTO: 4.91 M/UL (ref 4.7–6.1)
SODIUM SERPL-SCNC: 147 MMOL/L (ref 136–145)
VARIANT LYMPHS NFR BLD: 1 % (ref 0–8)
WBC # BLD AUTO: 14.5 K/UL (ref 4.8–10.8)

## 2024-02-23 PROCEDURE — 6360000002 HC RX W HCPCS: Performed by: NURSE PRACTITIONER

## 2024-02-23 PROCEDURE — 94640 AIRWAY INHALATION TREATMENT: CPT

## 2024-02-23 PROCEDURE — 2140000000 HC CCU INTERMEDIATE R&B

## 2024-02-23 PROCEDURE — 84100 ASSAY OF PHOSPHORUS: CPT

## 2024-02-23 PROCEDURE — 80048 BASIC METABOLIC PNL TOTAL CA: CPT

## 2024-02-23 PROCEDURE — 2700000000 HC OXYGEN THERAPY PER DAY

## 2024-02-23 PROCEDURE — 82962 GLUCOSE BLOOD TEST: CPT

## 2024-02-23 PROCEDURE — 2580000003 HC RX 258: Performed by: NURSE PRACTITIONER

## 2024-02-23 PROCEDURE — 6360000002 HC RX W HCPCS

## 2024-02-23 PROCEDURE — 6370000000 HC RX 637 (ALT 250 FOR IP): Performed by: NURSE PRACTITIONER

## 2024-02-23 PROCEDURE — 94760 N-INVAS EAR/PLS OXIMETRY 1: CPT

## 2024-02-23 PROCEDURE — 99233 SBSQ HOSP IP/OBS HIGH 50: CPT

## 2024-02-23 PROCEDURE — 85025 COMPLETE CBC W/AUTO DIFF WBC: CPT

## 2024-02-23 RX ADMIN — MORPHINE SULFATE 2 MG: 2 INJECTION, SOLUTION INTRAMUSCULAR; INTRAVENOUS at 12:22

## 2024-02-23 RX ADMIN — SODIUM CHLORIDE, PRESERVATIVE FREE 10 ML: 5 INJECTION INTRAVENOUS at 20:56

## 2024-02-23 RX ADMIN — SODIUM CHLORIDE, PRESERVATIVE FREE 10 ML: 5 INJECTION INTRAVENOUS at 08:12

## 2024-02-23 RX ADMIN — NITROGLYCERIN 0.5 INCH: 20 OINTMENT TOPICAL at 04:40

## 2024-02-23 RX ADMIN — WATER 40 MG: 1 INJECTION INTRAMUSCULAR; INTRAVENOUS; SUBCUTANEOUS at 08:11

## 2024-02-23 RX ADMIN — IPRATROPIUM BROMIDE 0.5 MG: 0.5 SOLUTION RESPIRATORY (INHALATION) at 14:37

## 2024-02-23 RX ADMIN — IPRATROPIUM BROMIDE 0.5 MG: 0.5 SOLUTION RESPIRATORY (INHALATION) at 06:44

## 2024-02-23 RX ADMIN — IPRATROPIUM BROMIDE 0.5 MG: 0.5 SOLUTION RESPIRATORY (INHALATION) at 18:51

## 2024-02-23 RX ADMIN — NITROGLYCERIN 0.5 INCH: 20 OINTMENT TOPICAL at 12:24

## 2024-02-23 RX ADMIN — BUDESONIDE INHALATION 500 MCG: 0.5 SUSPENSION RESPIRATORY (INHALATION) at 06:45

## 2024-02-23 RX ADMIN — NITROGLYCERIN 0.5 INCH: 20 OINTMENT TOPICAL at 16:57

## 2024-02-23 RX ADMIN — IPRATROPIUM BROMIDE 0.5 MG: 0.5 SOLUTION RESPIRATORY (INHALATION) at 10:06

## 2024-02-23 RX ADMIN — BUDESONIDE INHALATION 500 MCG: 0.5 SUSPENSION RESPIRATORY (INHALATION) at 18:51

## 2024-02-23 ASSESSMENT — PAIN SCALES - GENERAL
PAINLEVEL_OUTOF10: 9
PAINLEVEL_OUTOF10: 0

## 2024-02-23 ASSESSMENT — PAIN DESCRIPTION - LOCATION: LOCATION: LEG

## 2024-02-23 ASSESSMENT — PAIN DESCRIPTION - ORIENTATION: ORIENTATION: RIGHT;LEFT

## 2024-02-23 ASSESSMENT — PAIN SCALES - WONG BAKER: WONGBAKER_NUMERICALRESPONSE: 0

## 2024-02-23 ASSESSMENT — PAIN - FUNCTIONAL ASSESSMENT: PAIN_FUNCTIONAL_ASSESSMENT: ACTIVITIES ARE NOT PREVENTED

## 2024-02-23 ASSESSMENT — PAIN DESCRIPTION - DESCRIPTORS: DESCRIPTORS: ACHING

## 2024-02-24 LAB
BASOPHILS # BLD: 0.1 K/UL (ref 0–0.2)
BASOPHILS NFR BLD: 0.3 % (ref 0–1)
EOSINOPHIL # BLD: 0 K/UL (ref 0–0.6)
EOSINOPHIL NFR BLD: 0 % (ref 0–5)
ERYTHROCYTE [DISTWIDTH] IN BLOOD BY AUTOMATED COUNT: 18.6 % (ref 11.5–14.5)
GLUCOSE BLD-MCNC: 125 MG/DL (ref 70–99)
HCT VFR BLD AUTO: 48.1 % (ref 42–52)
HGB BLD-MCNC: 15.4 G/DL (ref 14–18)
IMM GRANULOCYTES # BLD: 0.3 K/UL
LYMPHOCYTES # BLD: 0.8 K/UL (ref 1.1–4.5)
LYMPHOCYTES NFR BLD: 4.7 % (ref 20–40)
MCH RBC QN AUTO: 29.7 PG (ref 27–31)
MCHC RBC AUTO-ENTMCNC: 32 G/DL (ref 33–37)
MCV RBC AUTO: 92.7 FL (ref 80–94)
MONOCYTES # BLD: 0.9 K/UL (ref 0–0.9)
MONOCYTES NFR BLD: 5.4 % (ref 0–10)
NEUTROPHILS # BLD: 14.9 K/UL (ref 1.5–7.5)
NEUTS SEG NFR BLD: 87.8 % (ref 50–65)
PERFORMED ON: ABNORMAL
PLATELET # BLD AUTO: 86 K/UL (ref 130–400)
PMV BLD AUTO: 13.5 FL (ref 9.4–12.4)
RBC # BLD AUTO: 5.19 M/UL (ref 4.7–6.1)
REASON FOR REJECTION: NORMAL
REASON FOR REJECTION: NORMAL
REJECTED TEST: NORMAL
REJECTED TEST: NORMAL
WBC # BLD AUTO: 17 K/UL (ref 4.8–10.8)

## 2024-02-24 PROCEDURE — 94640 AIRWAY INHALATION TREATMENT: CPT

## 2024-02-24 PROCEDURE — 6360000002 HC RX W HCPCS: Performed by: NURSE PRACTITIONER

## 2024-02-24 PROCEDURE — 6360000002 HC RX W HCPCS: Performed by: HOSPITALIST

## 2024-02-24 PROCEDURE — 2580000003 HC RX 258: Performed by: NURSE PRACTITIONER

## 2024-02-24 PROCEDURE — 6370000000 HC RX 637 (ALT 250 FOR IP): Performed by: NURSE PRACTITIONER

## 2024-02-24 PROCEDURE — 2700000000 HC OXYGEN THERAPY PER DAY

## 2024-02-24 PROCEDURE — 2140000000 HC CCU INTERMEDIATE R&B

## 2024-02-24 PROCEDURE — 6360000002 HC RX W HCPCS

## 2024-02-24 PROCEDURE — 82962 GLUCOSE BLOOD TEST: CPT

## 2024-02-24 PROCEDURE — 2580000003 HC RX 258: Performed by: HOSPITALIST

## 2024-02-24 PROCEDURE — 85025 COMPLETE CBC W/AUTO DIFF WBC: CPT

## 2024-02-24 RX ORDER — LORAZEPAM 2 MG/ML
1 INJECTION INTRAMUSCULAR EVERY 6 HOURS PRN
Status: DISCONTINUED | OUTPATIENT
Start: 2024-02-24 | End: 2024-02-25 | Stop reason: HOSPADM

## 2024-02-24 RX ADMIN — NITROGLYCERIN 0.5 INCH: 20 OINTMENT TOPICAL at 01:31

## 2024-02-24 RX ADMIN — LORAZEPAM 1 MG: 2 INJECTION INTRAMUSCULAR; INTRAVENOUS at 20:18

## 2024-02-24 RX ADMIN — BUDESONIDE INHALATION 500 MCG: 0.5 SUSPENSION RESPIRATORY (INHALATION) at 06:20

## 2024-02-24 RX ADMIN — MORPHINE SULFATE 2 MG: 2 INJECTION, SOLUTION INTRAMUSCULAR; INTRAVENOUS at 05:42

## 2024-02-24 RX ADMIN — LORAZEPAM 1 MG: 2 INJECTION INTRAMUSCULAR; INTRAVENOUS at 11:40

## 2024-02-24 RX ADMIN — SODIUM CHLORIDE, PRESERVATIVE FREE 10 ML: 5 INJECTION INTRAVENOUS at 20:18

## 2024-02-24 RX ADMIN — SODIUM CHLORIDE, PRESERVATIVE FREE 10 ML: 5 INJECTION INTRAVENOUS at 23:18

## 2024-02-24 RX ADMIN — IPRATROPIUM BROMIDE 0.5 MG: 0.5 SOLUTION RESPIRATORY (INHALATION) at 10:16

## 2024-02-24 RX ADMIN — MORPHINE SULFATE 2 MG: 2 INJECTION, SOLUTION INTRAMUSCULAR; INTRAVENOUS at 16:37

## 2024-02-24 RX ADMIN — IPRATROPIUM BROMIDE 0.5 MG: 0.5 SOLUTION RESPIRATORY (INHALATION) at 06:20

## 2024-02-24 RX ADMIN — IPRATROPIUM BROMIDE 0.5 MG: 0.5 SOLUTION RESPIRATORY (INHALATION) at 14:06

## 2024-02-24 RX ADMIN — BUDESONIDE INHALATION 500 MCG: 0.5 SUSPENSION RESPIRATORY (INHALATION) at 20:30

## 2024-02-24 RX ADMIN — MORPHINE SULFATE 0.5 MG/HR: 10 INJECTION INTRAVENOUS at 23:17

## 2024-02-24 RX ADMIN — SODIUM CHLORIDE, PRESERVATIVE FREE 10 ML: 5 INJECTION INTRAVENOUS at 23:17

## 2024-02-24 RX ADMIN — IPRATROPIUM BROMIDE 0.5 MG: 0.5 SOLUTION RESPIRATORY (INHALATION) at 20:30

## 2024-02-24 NOTE — CONSULTS
Comprehensive Nutrition Assessment    Type and Reason for Visit:  Consult, Reassess    Nutrition Recommendations/Plan:   Recommend Glucerna 1.5 with a goal rate of 52 mL/hr. Flush with 20 mL H2O per hour.  Remove mild malnutrition from problem list.  Add severe malnutrition to problem list.     Malnutrition Assessment:  Malnutrition Status:  Severe malnutrition (02/21/24 1521)    Context:  Acute Illness     Findings of the 6 clinical characteristics of malnutrition:  Energy Intake:  50% or less of estimated energy requirements for 5 or more days  Weight Loss:  No significant weight loss     Body Fat Loss:  Unable to assess Triceps   Muscle Mass Loss:  Unable to assess Thigh (quadraceps)  Fluid Accumulation:  Moderate to Severe Extremities   Strength:  Not Performed    Nutrition Assessment:    Received consult for TF recommendation only. Pt continues w/inadequate po intake x 5 days. Nursing documented attempt at placing DHT today, but unable d/t pt being uncooperative. BG is trending up: 123-151 mg/dL, noted pt is receiving solumedrol. Pt also has 2+ BLE edema. If tube is able to be placed, recommend Glucerna 1.5 with a goal rate of 52 mL/hr, free water flush 20 mL/hr. This will provide 1872 kcal, 103 g pro, 166 g CHO, 947 mL fluid from TF and 480 mL fluid from flush. Pt meets criteria for severe malnutrition AEB energy intake <50% for 5 days and moderate fluid accumulation. Will add dx and continue to monitor.    Nutrition Related Findings:    -151 mg/dL. 2+ BLE edema. Wound Type: Skin Tears       Current Nutrition Intake & Therapies:    Average Meal Intake: 0%, 1-25%  Average Supplements Intake: 0%  ADULT DIET; Dysphagia - Pureed; Mildly Thick (Nectar)  ADULT ORAL NUTRITION SUPPLEMENT; Lunch, Dinner; Frozen Oral Supplement  ADULT ORAL NUTRITION SUPPLEMENT; Breakfast; Fortified Gelatin Oral Supplement    Anthropometric Measures:  Height: 170.2 cm (5' 7.01\")  Ideal Body Weight (IBW): 148 lbs (67 kg)  
Nephrology ([facility]) Consult Note      Patient:  Pradip Oviedo  YOB: 1955  Date of Service: 2/17/2024  MRN: [id number]   Acct: [id number]   Primary Care Physician: Lori Sena APRN  Advance Directive: Full Code  Admit Date: 2/14/2024       Hospital Day: 3  Referring Provider: Ximena Chi MD    Patient independently seen and examined, Chart, Consults, Notes, Operative notes, Labs, Cardiology, and Radiology studies reviewed as available.    Chief complaint: Abnormal labs.    Subjective:  Pradip Oviedo is a 69 y.o. male for whom we were consulted for evaluation and treatment of acute kidney injury.  Patient denies any history of chronic kidney disease.  He has history of chronic obstructive pulm disease, active tobacco use, hypoglycemia, hypertension, pneumonia and diastolic CHF.  Patient also has moderate protein malnutrition.  Presented with increasing shortness of breath, he was in acute respiratory failure/hypoxemia.  He was supposed to be getting home oxygen but he never used it.  In emergency room he is BNP was 25,000, serum creatinine 1.8 mg.  His chest x-ray consistent with left pleural effusion.  He is now admitted for COPD exacerbation.  Patient had cold and clammy legs with mottling, underwent CTA of aorta/runoff consistent with multilevel diffuse atherosclerotic peripheral vascular disease.  He is being evaluated by vascular surgery.    This morning he is confused/disoriented.  His medical history is obtained from the chart he is unable to provide any history.  He is currently receiving IV fluid and has some urine output.    Allergies:  Patient has no known allergies.    Medicines:  Current Facility-Administered Medications   Medication Dose Route Frequency Provider Last Rate Last Admin    sodium zirconium cyclosilicate (LOKELMA) oral suspension 10 g  10 g Oral TID Ximena Chi MD   10 g at 02/17/24 0815    0.9 % sodium chloride infusion   
Palliative Care Consult Note    2/15/2024 1:27 PM  Subjective:  Admit Date: 2/14/2024  PCP: No primary care provider on file.    Date of Service: 2/15/2024    Reason for Consultation:  Goals of Care, Code Status, Family Support     History Obtained From: EMR/Patient and their Family    History Of Present Illness:   The patient is a 69 y.o. male with PMH COPD, HTN, diastolic CHF, and other comorbidities who presented to [facility] ED 2/14/2024 complaining of SOB worsening for several weeks.  On chart review it appears patient reported on day of admission he was having difficulty ambulating to the bathroom.  He has been unable to afford some of his respiratory medications and reportedly has supplemental oxygen at home but does not use it routinely.  On EMS arrival O2 sat was in the 80s.  Workup in ED revealed bnp 25,845, sodium 140, potassium 4.1, creatinine 0.8/bun 38, lactic acid 1.5, wbc 8.5k,hgb 15.3, platelets 298k, and respiratory pcr panel negative.  CXR with cardiomegaly, atherosclerosis, and small left pleural effusion with mild adjacent densities suggesting atelectasis and/or pneumonia. ABGs pH 7.4, pCO2 36, pO2 22.  He was admitted under hospitalist services for COPD exacerbation and initiated on empiric antibiotic therapy with IV steroids and bronchodilators.  Palliative care was consulted for goals of care discussions.  CTA pending for D-dimer 1.98.  Blood cultures obtained.    Past Medical History:        Diagnosis Date    Chronic heart failure with preserved ejection fraction (HFpEF) ([facility]) 05/22/2023    COPD (chronic obstructive pulmonary disease) ([facility])     Hypertension 04/17/2022       Past Surgical History:    History reviewed. No pertinent surgical history.    Home Medications:  Prior to Admission medications    Medication Sig Start Date End Date Taking? Authorizing Provider   albuterol sulfate HFA (PROVENTIL;VENTOLIN;PROAIR) 108 (90 Base) MCG/ACT inhaler Inhale 2 puffs into the lungs every 6 hours as 
Patient's name: Pradip Oviedo  MRN: [id number]  YOB: 1955    Date of consultation: 2/16/2024    Reason for consultation: Cyanosis hands and feet    Mr. Oviedo is a very pleasant 69-year-old gentleman with severe COPD chronic diastolic heart failure chronic hypoxia who was admitted from the emergency department with shortness of breath on 2/14/2024.  He was having increased shortness of breath, and was admitted to the ICU after undergoing a CTA of the chest.  There was no evidence of PE.  At the time of his transfer to the ICU, we were consulted to evaluate for findings of cyanosis at the hands and feet.  I was present when the patient underwent a vascular lab study.  While at the bedside, the tech was able to confirm my suspicion that this was all microcirculation vasoconstriction at the hands and feet.  The main arteries are patent with flow, but at the wrist and ankles they taper significantly, and in the midfoot and mid hands, the flow is difficult to detect.  Since this is all 4 extremities, unless there has been some acute event, I suspect this is Buerger's disease.  The treatment for Buerger's disease is smoking cessation.  I tried to discuss this with the patient, but he seems more focused on leaving the hospital and driving to [place].  I am not sure if he is delirious, or if he is simply motivated to leave the hospital.  But he seems to have a limited understanding of what I am trying to explain to him.  I do not think there is any intervention we can offer, but we can keep the hands and feet warm.  I explained this to the nurse and she is agreeable.    Allergies: No known drug allergies    Medications: Proventil 2 puffs inhalation, ASA 81 mg daily, Zithromax 500 mg every 24 hours, Pulmicort 500 mcg nebulization twice daily, ceftriaxone 1 g IV every 24 hours, ephedrine sulfate 25 mg daily, Mucinex 600 mg daily, heparin drip, DuoNeb inhalation every 4 hours, Solu-Medrol 40 mg IV every 12 hours, 
[facility]:  SN enters pt room with pt nurse JESSICA Rose. Pt is non responsive. Pt responds to pain when JESSICA Rose moves pt. FLACC 0 during visit when not being moved, FLACC 2 with movement. Pt is on 3L NC continous as reported by JESSICA Rose. Breathing is irregular. Pt has a graham catheter with tea colored urine.  Pt has mottling on bilateral legs up to knees and bilateral mottling on arms up to elbows. JESSICA Rose reports extremities are cool. JESSICA Rose reports pt has had no PO intake in 2 days. Pt received Morphine yesterday (2/23/24) at 1222 and again this morning (2/24/24) at 0542 as reported by JESSICA Rose. Vital signs reported from this moring were /85, HR 99, O2 100% on 3L n/c continous. Pt is a DNR. SN reviewed case with DR Zurita. Pt does not qualify for [facility] at this time. Discussed with pt nurse JESSICA Rose and pt wife, Lexus. v/u. Please call hospice if patient symptoms worsen/are not well controlled with current orders and HS can reevaluate. Thank you.  Hospice #: [phone]  
Spoke with pt nurse Rose. She reports that pt has continued to decline. Informed that hospice can reassess him for appropriateness for a gip admission to [facility]. SN spoke with pt wife Lexus, and she will meet with Ashley, hospice RN, in pt room at 10am for evaluation. If you have further needs, please call hospice at [phone].   
  Weight:       Height:           General appearance: Frail, chronically ill and lethargic appearing white male  Head: normal cephalic, atraumatic. EOMI bilaterally, no neck lymphadenopathy appreciated, no carotid bruits noted  Lungs: Bilateral symmetric expansion  Heart: Regular rate and rhythm  Abdomen: Soft, nondistended  Skin: warm, dry, no obvious rash, non-jaundice  Extremities: No clubbing or cyanosis.  There is pitting edema lower extremities bilaterally.  Also ecchymotic lesions appreciated on upper extremities bilaterally  Neurologic: Unable to obtain secondary to the patient's lethargic status      Labs:     Recent Labs     02/16/24  1615 02/16/24  2338 02/18/24  0200   WBC 8.2 8.9 13.4*   RBC 5.06 4.97 4.53*   HGB 15.4 15.1 14.0   HCT 48.6 47.9 43.6   MCV 96.0* 96.4* 96.2*   MCH 30.4 30.4 30.9   MCHC 31.7* 31.5* 32.1*    178 160     Recent Labs     02/16/24  2338 02/17/24  0414 02/17/24  0820 02/17/24  0908 02/18/24  0325     --  136  --  137   K 5.9*   < > 4.6 4.5 4.1   ANIONGAP 21*  --  13  --  14   CL 99  --  99  --  100   CO2 18*  --  24  --  23   BUN 62*  --  68*  --  68*   CREATININE 1.8*  --  1.8*  --  1.7*   GLUCOSE 179*  --  241*  --  149*   CALCIUM 8.8  --  8.6*  --  7.8*    < > = values in this interval not displayed.     Recent Labs     02/16/24 2338   MG 2.7*   PHOS 6.2*     Recent Labs     02/16/24  2338 02/18/24  0325   AST 1,235* 3,600*   * 2,108*   BILITOT 1.4* 1.9*   ALKPHOS 85 85     HgBA1c:  No components found for: \"HGBA1C\"  FLP:    Lab Results   Component Value Date/Time    TRIG 77 04/04/2023 11:43 AM    HDL 37 04/04/2023 11:43 AM    LDLCALC 59 04/04/2023 11:43 AM     TSH:    Lab Results   Component Value Date/Time    TSH 3.070 02/16/2024 11:38 PM     Troponin T: No results for input(s): \"TROPONINI\" in the last 72 hours.  INR:   Recent Labs     02/16/24  1615 02/16/24  2338   INR 1.93* 3.09*       No results for input(s): \"LIPASE\", \"AMYLASE\" in the last 72

## 2024-02-25 VITALS
WEIGHT: 130.19 LBS | OXYGEN SATURATION: 88 % | SYSTOLIC BLOOD PRESSURE: 64 MMHG | HEART RATE: 112 BPM | DIASTOLIC BLOOD PRESSURE: 27 MMHG | RESPIRATION RATE: 10 BRPM | HEIGHT: 67 IN | TEMPERATURE: 96.4 F | BODY MASS INDEX: 20.43 KG/M2

## 2024-02-25 NOTE — PROGRESS NOTES
02/15/24 1126   Resting (Room Air)   SpO2 92      Resting (On O2)   SpO2 96   HR 98   O2 Device Nasal cannula   O2 Flow Rate (l/min) 2 l/min   During Walk (Room Air)   SpO2 86      Rate of Dyspnea 1   Symptoms Dizziness;Fatigue;Shortness of breath;Other (comment)  (expiratory grunting)   Comments worked with pt for 10 min on room air to sit on side of bed.  pt unable to walk due to unstable   During Walk (On O2)   Comments unable to walk   After Walk   SpO2 94      O2 Device Nasal cannula   O2 Flow Rate (l/min) 2 l/min   Rate of Dyspnea 1   Symptoms Shortness of breath;Fatigue   Comments increase of WOB, unable to walk   Does the Patient Qualify for Home O2 Yes   Liter Flow at Rest 2   Liter Flow on Exertion 2   Does the Patient Need Portable Oxygen Tanks Yes       
   02/21/24 1400   Subjective   Subjective Patient agreeed to sit  EOB then after rolling  long sitting in bed stated \" No did not want to sit EOB \"  (Patient returned to supine and pulled up in bed positioned for placement of doboff for nutrional needs.  ( JESSICA Gurrola present ))   Pain Assessment   Pain Assessment None - Denies Pain   Bed Mobility Training   Bed Mobility Training Yes   Rolling Total assistance   Supine to Sit Total assistance   PT Equipment Recommendations   Other BTB alarm set JESSICA Gurrola present.   PT Plan of Care   Wednesday X       Electronically signed by Gaurav Pressley PTA on 2/21/2024 at 2:13 PM   
   Patient seen and examined at bedside with . Patient currently receiving breathing tx., appears somewhat confused. Patient ill appearing, short of breath when trying to talk with us. Hands  dusky, toe tips slightly dusky, feet and hands both slightly cool. Recommend continue of antiplatelet and anticoagulation. Patient will need to be more stable for Vascular intervention, patient scheduled for appointment at [facility] on 3/12/24 at 9:30am with ALIYAH Angel for hospital follow up.   
  Facility/Department: [facility] ICU   CLINICAL BEDSIDE SWALLOW EVALUATION    NAME: Pradip Oviedo  : 1955  MRN: [id number]  ADMISSION DATE: 2024  Date of Eval: 2024  Evaluating Therapist: Theodora Aguirre MS CCC-SLP      ADMITTING DIAGNOSIS:   has Stroke-like symptoms; Alcohol abuse, daily use; Hyponatremia; Chronic obstructive pulmonary disease (COPD) (HCC); Hypoglycemia; COPD exacerbation (HCC); Hypertension; Coronavirus infection; Rhinovirus infection; Hyperkalemia; Elevated brain natriuretic peptide (BNP) level; Pneumonia; Sepsis (HCC); Acute on chronic diastolic (congestive) heart failure (HCC); Hypoxia; Moderate malnutrition (HCC); Chronic heart failure with preserved ejection fraction (HFpEF) (HCC); Cognitive communication deficit; Dysphagia, oropharyngeal phase; Palliative care patient; Localized cyanosis; and Buerger's disease (HCC) on their problem list.      CUMULATIVE HOSPITAL COURSE:  Per APRN-CNP Notes: The patient is a 69-year-old male with past medical history of severe COPD, chronic diastolic heart failure, and chronic hypoxia who presented to [facility] ED with complaints of shortness of breath. The patient was very vague historian reported increasing shortness of breath for approximately 2 weeks. Patient reports on day of admission he was not able to go to the bathroom as he was so short of breath. Patient had concerned that he could not afford or fill his inhalers. Patient also reported supplemental oxygen at home however does not use routinely. In the ED patient was found to have BNP 25,845 previously 25,737 on 2023. Viral respiratory panel negative. Chest x-ray indicated cardiomegaly with arthrosclerosis, small left pleural effusion with mild adjacent density suggesting atelectasis and/or pneumonia. No noticeable vascular congestion, interstitial edema, or pneumothorax. ABGs revealed pH 7.4, pCO2 36, pO2 22. Patient was admitted to hospitalist service for further evaluation. D-dimer was 
  [facility]      Patient:  Pradip Oviedo  YOB: 1955  Date of Service: 2/24/2024  MRN: [id number]   Acct: [id number]   Primary Care Physician: Lori Sena APRN  Advance Directive: DNR  Admit Date: 2/14/2024       Hospital Day: 10  Portions of this note have been copied forward, however, changed to reflect the most current clinical status of this patient.  CHIEF COMPLAINT shortness of breath    SUBJECTIVE:He is quite lethargic today. His extremities are cool and mottled. His breathing is irregular. He does appear comfortable at current. He remains on 3L NC.     CUMULATIVE HOSPITAL COURSE:  The patient is a 69-year-old male with a PMH of severe COPD, chronic diastolic heart failure, and chronic hypoxia who presented to [facility] ED 2/14/2024 with complaints of shortness of breath. The patient was very vague historian reporting increased SOB for ~ 2 weeks. The patient reported on the day of admission he was not able to ambulate to the bathroom due to the severity of his SOB. The patient reported that he could not afford or fill his inhalers. Patient also reported supplemental oxygen at home, however, does not use it routinely.     Further ED work-up revealed  BNP 25,845 previously 25,737 on 4/30/2023. RPP negative for viral illness. Chest x-ray-cardiomegaly with arthrosclerosis, small left pleural effusion with mild adjacent density suggesting atelectasis and/or pneumonia. No noticeable vascular congestion, interstitial edema, or pneumothorax. ABGs-pH 7.4, pCO2 36, pO2 22. The patient was admitted to hospital medicine for further evaluation.     The patient was started on azithromycin and Rocephin for COPD exacerbation as well as Solu-Medrol. Patient started on nebs.     D-dimer was obtained and elevated at 1.98. Initial troponin 44 with repeat 35. Patient denied chest pain. Blood cultures obtained on day of admission and show NGTD. Antibiotics dc'd on 2/20/224 due to no evidence of infection.     
  [facility]      Patient:  Pradip Oviedo  YOB: 1955  Date of Service: 2/23/2024  MRN: [id number]   Acct: [id number]   Primary Care Physician: Lori Sena APRN  Advance Directive: DNR  Admit Date: 2/14/2024       Hospital Day: 9  Portions of this note have been copied forward, however, changed to reflect the most current clinical status of this patient.  CHIEF COMPLAINT shortness of breath    SUBJECTIVE: Mental status continues to wax and wane. All 4 extremities have become cool and cyanotic. This has made it difficult to obtain a pulse ox. Remains on 2L NC.    CUMULATIVE HOSPITAL COURSE:  The patient is a 69-year-old male with a PMH of severe COPD, chronic diastolic heart failure, and chronic hypoxia who presented to [facility] ED 2/14/2024 with complaints of shortness of breath. The patient was very vague historian reporting increased SOB for ~ 2 weeks. The patient reported on the day of admission he was not able to ambulate to the bathroom due to the severity of his SOB. The patient reported that he could not afford or fill his inhalers. Patient also reported supplemental oxygen at home, however, does not use it routinely.   Further ED work-up revealed  BNP 25,845 previously 25,737 on 4/30/2023. RPP negative for viral illness. Chest x-ray-cardiomegaly with arthrosclerosis, small left pleural effusion with mild adjacent density suggesting atelectasis and/or pneumonia. No noticeable vascular congestion, interstitial edema, or pneumothorax. ABGs-pH 7.4, pCO2 36, pO2 22. The patient was admitted to hospital medicine for further evaluation.   The patient was started on azithromycin and Rocephin for COPD exacerbation as well as Solu-Medrol. Patient started on nebs.   D-dimer was obtained and elevated at 1.98. Initial troponin 44 with repeat 35. Patient denied chest pain. Blood cultures obtained on day of admission and show NGTD. Antibiotics dc'd on 2/20/224 due to no evidence of infection.     Social 
  Palliative Care Progress Note  2/16/2024 8:55 AM    Patient:  Pradip Oviedo  YOB: 1955  Primary Care Physician: No primary care provider on file.  Advance Directive: Full Code  Admit Date: 2/14/2024       Hospital Day: 2  Portions of this note have been copied forward, however, changed to reflect the most current clinical status of this patient.    CHIEF COMPLAINT/REASON FOR CONSULTATION Goals of care, family support, Code status, symptom management     SUBJECTIVE:  Mr. Oviedo  has had a decline in mental status this afternoon.  Unable to participate meaningfully in my exam.  No acute distress.  Tolerating NC O2.    HPI: The patient is a 69 y.o. male with PMH COPD, HTN, diastolic CHF, and other comorbidities who presented to [facility] ED 2/14/2024 complaining of SOB worsening for several weeks.  On chart review it appears patient reported on day of admission he was having difficulty ambulating to the bathroom.  He has been unable to afford some of his respiratory medications and reportedly has supplemental oxygen at home but does not use it routinely.  On EMS arrival O2 sat was in the 80s.  Workup in ED revealed bnp 25,845, sodium 140, potassium 4.1, creatinine 0.8/bun 38, lactic acid 1.5, wbc 8.5k,hgb 15.3, platelets 298k, and respiratory pcr panel negative.  CXR with cardiomegaly, atherosclerosis, and small left pleural effusion with mild adjacent densities suggesting atelectasis and/or pneumonia. ABGs pH 7.4, pCO2 36, pO2 22.  He was admitted under hospitalist services for COPD exacerbation and initiated on empiric antibiotic therapy with IV steroids and bronchodilators.  Palliative care was consulted for goals of care discussions.  CTA pending for D-dimer 1.98.  Blood cultures obtained.     Review of Systems:   14 point review of systems is negative except as specifically addressed above.    Objective:   VITALS:  BP (!) 138/91   Pulse (!) 105   Temp 97.2 °F (36.2 °C) (Temporal)   Resp 20   Ht 1.727 m 
  Palliative Care Progress Note  2/19/2024 7:45 AM    Patient:  Pradip Oviedo  YOB: 1955  Primary Care Physician: Lori Sena APRN  Advance Directive: Full Code  Admit Date: 2/14/2024       Hospital Day: 5  Portions of this note have been copied forward, however, changed to reflect the most current clinical status of this patient.    CHIEF COMPLAINT/REASON FOR CONSULTATION Goals of care, family support, Code status, symptom management     SUBJECTIVE:  Mr. Oviedo is more alert and communicative today but does remain confused.  Spouse attempting to feed him lunch.  No acute distress during my visit.    HPI: The patient is a 69 y.o. male with PMH COPD, HTN, diastolic CHF, and other comorbidities who presented to [facility] ED 2/14/2024 complaining of SOB worsening for several weeks.  On chart review it appears patient reported on day of admission he was having difficulty ambulating to the bathroom.  He has been unable to afford some of his respiratory medications and reportedly has supplemental oxygen at home but does not use it routinely.  On EMS arrival O2 sat was in the 80s.  Workup in ED revealed bnp 25,845, sodium 140, potassium 4.1, creatinine 0.8/bun 38, lactic acid 1.5, wbc 8.5k,hgb 15.3, platelets 298k, and respiratory pcr panel negative.  CXR with cardiomegaly, atherosclerosis, and small left pleural effusion with mild adjacent densities suggesting atelectasis and/or pneumonia. ABGs pH 7.4, pCO2 36, pO2 22.  He was admitted under hospitalist services for COPD exacerbation and initiated on empiric antibiotic therapy with IV steroids and bronchodilators.  Palliative care was consulted for goals of care discussions.  CTA pending for D-dimer 1.98.  Blood cultures obtained.     Review of Systems:   14 point review of systems is negative except as specifically addressed above.    Objective:   VITALS:  /81   Pulse 96   Temp 97.5 °F (36.4 °C)   Resp 20   Ht 1.702 m (5' 7\")   Wt 59.5 kg (131 lb 3 
  Palliative Care Progress Note  2/20/2024 8:26 AM    Patient:  Pradip Oviedo  YOB: 1955  Primary Care Physician: Lori Sena APRN  Advance Directive: Full Code  Admit Date: 2/14/2024       Hospital Day: 6  Portions of this note have been copied forward, however, changed to reflect the most current clinical status of this patient.    CHIEF COMPLAINT/REASON FOR CONSULTATION Goals of care, family support, Code status, symptom management     SUBJECTIVE:  Mr. Oviedo is resting comfortably in bed with oxygen off which I have replaced.  Wakes to voice and communicative but remains confused.  Requesting to go home.  No distress during my exam    HPI: The patient is a 69 y.o. male with PMH COPD, HTN, diastolic CHF, and other comorbidities who presented to [facility] ED 2/14/2024 complaining of SOB worsening for several weeks.  On chart review it appears patient reported on day of admission he was having difficulty ambulating to the bathroom.  He has been unable to afford some of his respiratory medications and reportedly has supplemental oxygen at home but does not use it routinely.  On EMS arrival O2 sat was in the 80s.  Workup in ED revealed bnp 25,845, sodium 140, potassium 4.1, creatinine 0.8/bun 38, lactic acid 1.5, wbc 8.5k,hgb 15.3, platelets 298k, and respiratory pcr panel negative.  CXR with cardiomegaly, atherosclerosis, and small left pleural effusion with mild adjacent densities suggesting atelectasis and/or pneumonia. ABGs pH 7.4, pCO2 36, pO2 22.  He was admitted under hospitalist services for COPD exacerbation and initiated on empiric antibiotic therapy with IV steroids and bronchodilators.  Palliative care was consulted for goals of care discussions.  CTA pending for D-dimer 1.98.  Blood cultures obtained.     Review of Systems:   14 point review of systems is negative except as specifically addressed above.    Objective:   VITALS:  /85   Pulse (!) 105   Temp 97 °F (36.1 °C) (Temporal)   
  Palliative Care Progress Note  2/21/2024 12:22 PM    Patient:  Pradip Oviedo  YOB: 1955  Primary Care Physician: Lori Sena APRN  Advance Directive: Full Code  Admit Date: 2/14/2024       Hospital Day: 7  Portions of this note have been copied forward, however, changed to reflect the most current clinical status of this patient.    CHIEF COMPLAINT/REASON FOR CONSULTATION Goals of care, family support, Code status, symptom management     SUBJECTIVE:  Mr. Oviedo continues with confusion and participates minimally in my visit.  Has had minimal p.o. intake.  Hematuria noted today.  Family meeting conducted with wife and hospitalist NP.  No acute distress during exam.    HPI: The patient is a 69 y.o. male with PMH COPD, HTN, diastolic CHF, and other comorbidities who presented to [facility] ED 2/14/2024 complaining of SOB worsening for several weeks.  On chart review it appears patient reported on day of admission he was having difficulty ambulating to the bathroom.  He has been unable to afford some of his respiratory medications and reportedly has supplemental oxygen at home but does not use it routinely.  On EMS arrival O2 sat was in the 80s.  Workup in ED revealed bnp 25,845, sodium 140, potassium 4.1, creatinine 0.8/bun 38, lactic acid 1.5, wbc 8.5k,hgb 15.3, platelets 298k, and respiratory pcr panel negative.  CXR with cardiomegaly, atherosclerosis, and small left pleural effusion with mild adjacent densities suggesting atelectasis and/or pneumonia. ABGs pH 7.4, pCO2 36, pO2 22.  He was admitted under hospitalist services for COPD exacerbation and initiated on empiric antibiotic therapy with IV steroids and bronchodilators.  Palliative care was consulted for goals of care discussions.  CTA pending for D-dimer 1.98.  Blood cultures obtained.     Review of Systems:   14 point review of systems is negative except as specifically addressed above.    Objective:   VITALS:  BP (!) 147/88   Pulse (!) 110   
  [facility]      Patient:  Pradip Oviedo  YOB: 1955  Date of Service: 2/22/2024  MRN: [id number]   Acct: [id number]   Primary Care Physician: Lori Sena APRN  Advance Directive: DNR  Admit Date: 2/14/2024       Hospital Day: 8  Portions of this note have been copied forward, however, changed to reflect the most current clinical status of this patient.  CHIEF COMPLAINT shortness of breath    SUBJECTIVE: Mental status continues to wax and wane. He is quite alert today and apparently ate a good amount of his breakfast.  Mild tachycardia, but improving with beta blocker. Remains on 2L NC.    CUMULATIVE HOSPITAL COURSE:  The patient is a 69-year-old male with a PMH of severe COPD, chronic diastolic heart failure, and chronic hypoxia who presented to [facility] ED 2/14/2024 with complaints of shortness of breath. The patient was very vague historian reporting increased SOB for ~ 2 weeks. The patient reported on the day of admission he was not able to ambulate to the bathroom due to the severity of his SOB. The patient reported that he could not afford or fill his inhalers. Patient also reported supplemental oxygen at home, however, does not use it routinely.   Further ED work-up revealed  BNP 25,845 previously 25,737 on 4/30/2023. RPP negative for viral illness. Chest x-ray-cardiomegaly with arthrosclerosis, small left pleural effusion with mild adjacent density suggesting atelectasis and/or pneumonia. No noticeable vascular congestion, interstitial edema, or pneumothorax. ABGs-pH 7.4, pCO2 36, pO2 22. The patient was admitted to hospital medicine for further evaluation.   The patient was started on azithromycin and Rocephin for COPD exacerbation as well as Solu-Medrol. Patient started on nebs.   D-dimer was obtained and elevated at 1.98. Initial troponin 44 with repeat 35. Patient denied chest pain. Blood cultures obtained on day of admission and show NGTD. Antibiotics dc'd on 2/20/224 due to no 
  [facility]      Patient:  Pradip Oviedo  YOB: 1955  Date of Service: 2/21/2024  MRN: [id number]   Acct: [id number]   Primary Care Physician: Lori Sena APRN  Advance Directive: Full Code  Admit Date: 2/14/2024       Hospital Day: 7  Portions of this note have been copied forward, however, changed to reflect the most current clinical status of this patient.  CHIEF COMPLAINT shortness of breath    SUBJECTIVE: Mental status continues to wax and wane. She is quiet lethargic on today's exam. Mild tachycardia. Remains on 2L NC.He did develop hematuria overnight, however, UOP remains adequate.     CUMULATIVE HOSPITAL COURSE:  The patient is a 69-year-old male with a PMH of severe COPD, chronic diastolic heart failure, and chronic hypoxia who presented to [facility] ED 2/14/2024 with complaints of shortness of breath. The patient was very vague historian reporting increased SOB for ~ 2 weeks. The patient reported on the day of admission he was not able to ambulate to the bathroom due to the severity of his SOB. The patient reported that he could not afford or fill his inhalers. Patient also reported supplemental oxygen at home, however, does not use it routinely.   Further ED work-up revealed  BNP 25,845 previously 25,737 on 4/30/2023. RPP negative for viral illness. Chest x-ray-cardiomegaly with arthrosclerosis, small left pleural effusion with mild adjacent density suggesting atelectasis and/or pneumonia. No noticeable vascular congestion, interstitial edema, or pneumothorax. ABGs-pH 7.4, pCO2 36, pO2 22. The patient was admitted to hospital medicine for further evaluation.   The patient was started on azithromycin and Rocephin for COPD exacerbation as well as Solu-Medrol. Patient started on nebs.   D-dimer was obtained and elevated at 1.98. Initial troponin 44 with repeat 35. Patient denied chest pain. Blood cultures obtained on day of admission and show NGTD. Antibiotics dc'd on 2/20/224 due to no 
  [facility]      Patient:  Pradip Oviedo  YOB: 1955  Date of Service: 2/16/2024  MRN: [id number]   Acct: [id number]   Primary Care Physician: Lori Sena APRN  Advance Directive: Full Code  Admit Date: 2/14/2024       Hospital Day: 2  Portions of this note have been copied forward, however, changed to reflect the most current clinical status of this patient.  CHIEF COMPLAINT shortness of breath     SUBJECTIVE: Significant mental status change    CUMULATIVE HOSPITAL COURSE:  The patient is a 69-year-old male with past medical history of severe COPD, chronic diastolic heart failure, and chronic hypoxia who presented to [facility] ED with complaints of shortness of breath. The patient was very vague historian reported increasing shortness of breath for approximately 2 weeks. Patient reports on day of admission he was not able to go to the bathroom as he was so short of breath. Patient had concerned that he could not afford or fill his inhalers. Patient also reported supplemental oxygen at home however does not use routinely. In the ED patient was found to have BNP 25,845 previously 25,737 on 4/30/2023. Viral respiratory panel negative. Chest x-ray indicated cardiomegaly with arthrosclerosis, small left pleural effusion with mild adjacent density suggesting atelectasis and/or pneumonia. No noticeable vascular congestion, interstitial edema, or pneumothorax. ABGs revealed pH 7.4, pCO2 36, pO2 22. Patient was admitted to hospitalist service for further evaluation. D-dimer was obtained and elevated at 1.98. Initial troponin 44 with repeat 35. Patient denies chest pain. Blood cultures were obtained and in progress. Patient was started on azithromycin and Rocephin for COPD exacerbation as well as Solu-Medrol. Patient started on nebs. Social work consulted with assistance in ensuring patient has medications. Medications have been sent to patient's pharmacy to assess affordability. Requested patient's wife 
  Physician Progress Note      PATIENT:               IKTTY CLEMENT  [facility] #:                  [id number]  :                       1955  ADMIT DATE:       2024 12:58 PM  DISCH DATE:  RESPONDING  PROVIDER #:        Ximena Chi MD        QUERY TEXT:    Type of Shock: Please provide further specificity, if known.    Clinical indicators include: blood cultures, bolus, hgb, blood, antibiotics,   blood culture, shock, hemorrhage, antibiotic, 5 units, hg  Options provided:  -- Cardiogenic shock  -- Septic shock  -- Hypovolemic shock  -- Hemorrhagic shock due to trauma  -- Hemorrhagic shock related to surgery  -- Anaphylactic shock  -- Other - I will add my own diagnosis  -- Disagree - Not applicable / Not valid  -- Disagree - Clinically Unable to determine / Unknown        PROVIDER RESPONSE TEXT:    Provider dismissed this query because it was not applicable to the patient or   not a valid query.      Electronically signed by:  Ximena Chi MD 2024 2:29 PM          
  Physician Progress Note      PATIENT:               KITTY CLEMENT  CSN #:                  [id number]  :                       1955  ADMIT DATE:       2024 12:58 PM  DISCH DATE:  RESPONDING  PROVIDER #:        MAGNO YOUNG          QUERY TEXT:    Patient admitted with altered mental status. Noted to have mild decrease in   energy and mild fluid accumulation in the extremities. If possible, please   document in progress notes and discharge summary if you are evaluating and /or   treating any of the following:    The medical record reflects the following:  Risk Factors:  COPD, CHF, Chronic hypoxia  Clinical Indicators: Fluid accumulation in the extremities with decreased   energy. Dietary consult documents mild malnutrition in their notes .  Treatment: Dietary consult and treatment. Dysphagia diet, ONS.    Thank you  Avani Sutton RN, BSN, [facility]  [phone]    ASPEN Criteria:    https://aspenjournals.onlinelibrary.erwin.com/doi/full/[email or web address]  5  Options provided:  -- Protein calorie malnutrition mild  -- Other - I will add my own diagnosis  -- Disagree - Not applicable / Not valid  -- Disagree - Clinically unable to determine / Unknown  -- Refer to Clinical Documentation Reviewer    PROVIDER RESPONSE TEXT:    This patient has mild protein calorie malnutrition.    Query created by: Avani Sutton on 2024 9:22 AM      Electronically signed by:  MAGNO YOUNG 2024 9:28 AM          
  [facility]      Patient:  Pradip Oviedo  YOB: 1955  Date of Service: 2/15/2024  MRN: [id number]   Acct: [id number]   Primary Care Physician: No primary care provider on file.  Advance Directive: Full Code  Admit Date: 2/14/2024       Hospital Day: 1  Portions of this note have been copied forward, however, changed to reflect the most current clinical status of this patient.  CHIEF COMPLAINT shortness of breath    SUBJECTIVE:  Patient reports breathing is better today. He is very fixated on medications for home and going home. Very difficult to obtain information from patient.     CUMULATIVE HOSPITAL COURSE:  The patient is a 69-year-old male with past medical history of severe COPD, chronic diastolic heart failure, and chronic hypoxia who presented to [facility] ED with complaints of shortness of breath.  The patient was very vague historian reported increasing shortness of breath for approximately 2 weeks.  Patient reports on day of admission he was not able to go to the bathroom as he was so short of breath.  Patient had concerned that he could not afford or fill his inhalers.  Patient also reported supplemental oxygen at home however does not use routinely.  In the ED patient was found to have BNP 25,845 previously 25,737 on 4/30/2023.  Viral respiratory panel negative.  Chest x-ray indicated cardiomegaly with arthrosclerosis, small left pleural effusion with mild adjacent density suggesting atelectasis and/or pneumonia.  No noticeable vascular congestion, interstitial edema, or pneumothorax.  ABGs revealed pH 7.4, pCO2 36, pO2 22.  Patient was admitted to hospitalist service for further evaluation.  D-dimer was obtained and elevated at 1.98.  Initial troponin 44 with repeat 35.  Patient denies chest pain.  Blood cultures were obtained and in progress.  Patient was started on azithromycin and Rocephin for COPD exacerbation as well as Solu-Medrol.  Patient started on nebs.  Social work consulted 
4 Eyes Skin Assessment     NAME:  Pradip Oviedo  YOB: 1955  MEDICAL RECORD NUMBER:  [id number]    The patient is being assessed for  Admission    I agree that at least one RN has performed a thorough Head to Toe Skin Assessment on the patient. ALL assessment sites listed below have been assessed.      Areas assessed by both nurses:    Head, Face, Ears, Shoulders, Back, Chest, Arms, Elbows, Hands, and Legs. Feet and Heels        Does the Patient have a Wound? Yes wound(s) were present on assessment. LDA wound assessment was Initiated and completed by RN       Sebastien Prevention initiated by RN: Yes  Wound Care Orders initiated by RN: Yes    Pressure Injury (Stage 3,4, Unstageable, DTI, NWPT, and Complex wounds) if present, place Wound referral order by RN under : No    New Ostomies, if present place, Ostomy referral order under : No     Nurse 1 eSignature: Electronically signed by Shayna Contreras RN on 2/14/24 at 7:17 PM CST    **SHARE this note so that the co-signing nurse can place an eSignature**    Nurse 2 eSignature: {Esignature:[id number]}   
80 ML of morphine drip wasted with Yennifer LOPEZ   
Anti-Xa level is 0.44  Level is therapeutic. Will continue to monitor.  
Comprehensive Nutrition Assessment    Type and Reason for Visit:  Positive Nutrition Screen    Nutrition Recommendations/Plan:   Add Alireza mixed in lemonade bid w/lunch and dinner.  Food preferences updated.     Malnutrition Assessment:  Malnutrition Status:  At risk for malnutrition (Comment) (02/15/24 6102)    Context:  Acute Illness     Findings of the 6 clinical characteristics of malnutrition:  Energy Intake:  Mild decrease in energy intake (Comment)  Weight Loss:  No significant weight loss     Body Fat Loss:  Unable to assess     Muscle Mass Loss:  Unable to assess    Fluid Accumulation:  No significant fluid accumulation     Strength:  Not Performed    Nutrition Assessment:    +NS for wt loss and decreased appetite. Pt states his appetite is not good. Observed 1-25% of breakfast consumed. Intake record shows 1-25% at lunch. Observed Ensure w/breakfast tray, untouched. Pt stated he's never drank a supplement in his life and wanted it d/c. He was agreeable to try Alireza mixed in lemonade; will add bid w/lunch and dinner. Food preferences obtained. Pt likes blueberry desserts, fried chicken, mac and cheese, and turnip greens. He dislikes eggs, toast, and milk. Reviewed wt hx. Pt stated he's lost some wt but was unsure how much or the timeframe. Wt hx shows gain of 15# since 6/13/23 (125#), but no significant changes. Noted pro-BNP 25,845. Will continue to monitor.    Nutrition Related Findings:    Pro-BNP 25,845, BUN 44, BG  mg/dL.         Current Nutrition Intake & Therapies:    Average Meal Intake: 1-25%  Average Supplements Intake: None Ordered  ADULT DIET; Regular; Low Fat/Low Chol/High Fiber/2 gm Na  ADULT ORAL NUTRITION SUPPLEMENT; Lunch, Dinner; Wound Healing Oral Supplement    Anthropometric Measures:  Height: 172.7 cm (5' 7.99\") (4/3/23)  Ideal Body Weight (IBW): 154 lbs (70 kg)    Current Body Weight: 63.5 kg (140 lb), 90.9 % IBW.    Current BMI (kg/m2): 21.3  BMI Categories: Underweight (BMI 
Comprehensive Nutrition Assessment    Type and Reason for Visit:  Reassess    Nutrition Recommendations/Plan:   Continue POC.     Malnutrition Assessment:  Malnutrition Status:  Mild malnutrition (02/20/24 1503)    Context:  Acute Illness     Findings of the 6 clinical characteristics of malnutrition:  Energy Intake:  Mild decrease in energy intake (Comment)  Weight Loss:  No significant weight loss     Body Fat Loss:  No significant body fat loss Triceps   Muscle Mass Loss:  No significant muscle mass loss Thigh (quadraceps)  Fluid Accumulation:  Mild Extremities   Strength:  Not Performed    Nutrition Assessment:    Pt transferred back to PCU. PO intake continues to be down, record shows 0-25%. Visited pt during lunch. Pt was resting in bed, fatigued and very soft spoken compared to initial visit last week. Observed lunch tray on side table near bed. Pt was able to reach for water and take a drink, but the tray was further from him on the table. Asked pt if he was hungry and would like to eat; listed off items on his tray. Pt initially stated he was not hungry but then said he'd try the Magic Cup. I prepared the ONS for him to consume and moved his side table closer so he could easily reach it. Records show 0% for lunch today. New wt is stable compared to admission wt. Will continue to monitor.    Nutrition Related Findings:    BUN 47, BG  mg/dL. 2+ BLE edema. Wound Type: Skin Tears       Current Nutrition Intake & Therapies:    Average Meal Intake: 0%, 1-25%  Average Supplements Intake: 0%  ADULT DIET; Dysphagia - Pureed; Mildly Thick (Nectar)  ADULT ORAL NUTRITION SUPPLEMENT; Lunch, Dinner; Frozen Oral Supplement  ADULT ORAL NUTRITION SUPPLEMENT; Breakfast; Fortified Gelatin Oral Supplement    Anthropometric Measures:  Height: 170.2 cm (5' 7.01\")  Ideal Body Weight (IBW): 148 lbs (67 kg)    Admission Body Weight: 63.5 kg (140 lb)  Current Body Weight: 61.7 kg (136 lb 0.4 oz), 91.9 % IBW.    Current BMI 
Comprehensive Nutrition Assessment    Type and Reason for Visit:  Reassess    Nutrition Recommendations/Plan:   Start ONS.  Follow for SLP recommendations     Malnutrition Assessment:  Malnutrition Status:  Mild malnutrition (02/17/24 1220)    Context:  Acute Illness     Findings of the 6 clinical characteristics of malnutrition:  Energy Intake:  Mild decrease in energy intake (Comment)  Weight Loss:  No significant weight loss     Body Fat Loss:  No significant body fat loss Triceps   Muscle Mass Loss:  No significant muscle mass loss    Fluid Accumulation:  Mild Extremities   Strength:  Not Performed    Nutrition Assessment:    Pt has been transfered to ICU.  SLP evaluated this a.m. and diet hs been advanced from NPO to dysphagia pureed with mildly thick liquids.  Had not yet received meal .  Aware pt now confused.  Glucose/Accuchek's elevatd, but pt is receiving Solumedrol.  New wt NA    Nutrition Related Findings:    BUN 68.  Glucose 179-241.  Accuchek's 117-145 Wound Type: Skin Tears       Current Nutrition Intake & Therapies:    Average Meal Intake: Unable to assess  Average Supplements Intake: None Ordered  ADULT DIET; Dysphagia - Pureed; Mildly Thick (Nectar)    Anthropometric Measures:  Height: 172.7 cm (5' 7.99\") (4/3/23)  Ideal Body Weight (IBW): 154 lbs (70 kg)       Current Body Weight: 63.5 kg (140 lb), 90.9 % IBW.    Current BMI (kg/m2): 21.3  BMI Categories: Underweight (BMI less than 22) age over 65    Estimated Daily Nutrient Needs:  Energy Requirements Based On: Kcal/kg  Weight Used for Energy Requirements: Current  Energy (kcal/day): 8367-0020 kcals (20-25 kcals/kg)  Weight Used for Protein Requirements: Current  Protein (g/day): 127g  Method Used for Fluid Requirements: 1 ml/kcal  Fluid (ml/day): 1597-5584 ml    Nutrition Diagnosis:   Inadequate oral intake related to acute injury/trauma, swallowing difficulty, biting/chewing (masticatory) difficulty, increase demand for energy/nutrients, 
Heparin adjusted to new lab value. Done increased by 2 units.   
Hospice called and stated that they will be coming to re-evaluate the pt for inpatient hospice this morning. This nurse then attempted to call the pt's wife, Leuxs, at 0823. No answer.     Electronically signed by Rose Castañeda RN on 2/24/2024 at 8:26 AM    
I>O. Has not eaten anything today, despite offers to supply other food. Dr. Chi aware. No new orders.  
Lab called both blood samples hemolyzed and unable to be tested. Pt very agitated at this point, will try to redraw labs at later time.   
Nephrology ([facility]) Progress Note      Patient:  Pradip Oviedo  YOB: 1955  Date of Service: 2/18/2024  MRN: [id number]   Acct: [id number]   Primary Care Physician: Lori Sena APRN  Advance Directive: Full Code  Admit Date: 2/14/2024       Hospital Day: 4  Referring Provider: Ximena Chi MD    Patient independently seen and examined, Chart, Consults, Notes, Operative notes, Labs, Cardiology, and Radiology studies reviewed as available.    Chief complaint: Abnormal labs.    Subjective:  Pradip Oviedo is a 69 y.o. male for whom we were consulted for evaluation and treatment of acute kidney injury.  Patient denies any history of chronic kidney disease.  He has history of chronic obstructive pulm disease, active tobacco use, hypoglycemia, hypertension, pneumonia and diastolic CHF.  Patient also has moderate protein malnutrition.  Presented with increasing shortness of breath, he was in acute respiratory failure/hypoxemia.  He was supposed to be getting home oxygen but he never used it.  In emergency room he is BNP was 25,000, serum creatinine 1.8 mg.  His chest x-ray consistent with left pleural effusion.  He is now admitted for COPD exacerbation.  Patient had cold and clammy legs with mottling, underwent CTA of aorta/runoff consistent with multilevel diffuse atherosclerotic peripheral vascular disease.  He is being evaluated by vascular surgery.    This morning patient remains confused/disoriented.  His hemodynamics are better.  He has responded to IV fluid with good urine output.    Allergies:  Patient has no known allergies.    Medicines:  Current Facility-Administered Medications   Medication Dose Route Frequency Provider Last Rate Last Admin    vancomycin (VANCOCIN) 1,250 mg in sodium chloride 0.9 % 250 mL IVPB  1,250 mg IntraVENous Once Ana M Tillman APRN - CNP        0.9 % sodium chloride infusion   IntraVENous Continuous Ximena Chi MD 
Nephrology ([facility]) Progress Note    Patient:  Pradip Oviedo  YOB: 1955  Date of Service: 2/19/2024  MRN: [id number]   Acct: [id number]   Primary Care Physician: Lori Sena APRN  Advance Directive: Full Code  Admit Date: 2/14/2024       Hospital Day: 5  Referring Provider: Ximena Chi MD    Patient independently seen and examined, Chart, Consults, Notes, Operative notes, Labs, Cardiology, and Radiology studies reviewed as available.    Subjective:  Pradip Oviedo is a 69 y.o. male for whom we were consulted for evaluation and treatment of acute kidney injury. Patient denied any history of chronic kidney disease. He has history of chronic obstructive pulm disease, active tobacco use, hypoglycemia, hypertension, pneumonia and diastolic CHF. Patient also has moderate protein malnutrition. Presented with increasing shortness of breath. He was in acute respiratory failure with hypoxemia. He was supposed to be getting home oxygen but he never used it. In emergency room, his BNP was 25,000 and serum creatinine 1.8. His chest x-ray was consistent with left pleural effusion. He was admitted for COPD exacerbation. Patient had cold and clammy legs with mottling and underwent CTA of aorta/runoff consistent with multilevel diffuse atherosclerotic peripheral vascular disease. He was being evaluated by vascular surgery..  Hypertension noted on 2/17.    Today, no overnight events.  Remains with limited participation in examination.  Seen with hospitalist.    Allergies:  Patient has no known allergies.    Medicines:  Current Facility-Administered Medications   Medication Dose Route Frequency Provider Last Rate Last Admin    heparin (porcine) injection 3,810 Units  60 Units/kg (Order-Specific) IntraVENous PRN Ximena Chi MD        heparin (porcine) injection 1,910 Units  30 Units/kg (Order-Specific) IntraVENous PRN Ximena Chi MD        heparin 25,000 
Nephrology ([facility]) Progress Note    Patient:  Pradip Oviedo  YOB: 1955  Date of Service: 2/22/2024  MRN: [id number]   Acct: [id number]   Primary Care Physician: Lori Sena APRN  Advance Directive: DNR  Admit Date: 2/14/2024       Hospital Day: 8  Referring Provider: Lexus Wilson MD    Patient independently seen and examined, Chart, Consults, Notes, Operative notes, Labs, Cardiology, and Radiology studies reviewed as available.    Subjective:  Pradip Oviedo is a 69 y.o. male for whom we were consulted for evaluation and treatment of acute kidney injury. Patient denied any history of chronic kidney disease. He has history of chronic obstructive pulm disease, active tobacco use, hypoglycemia, hypertension, pneumonia and diastolic CHF. Patient also has moderate protein malnutrition. Presented with increasing shortness of breath. He was in acute respiratory failure with hypoxemia. He was supposed to be getting home oxygen but he never used it. In emergency room, his BNP was 25,000 and serum creatinine 1.8. His chest x-ray was consistent with left pleural effusion. He was admitted for COPD exacerbation. Patient had cold and clammy legs with mottling and underwent CTA of aorta/runoff consistent with multilevel diffuse atherosclerotic peripheral vascular disease. He was being evaluated by vascular surgery..  Hypertension noted on 2/17.    Today, no overnight events.  Remains with limited participation in examination.  Seen with nursing.  Still with limited oral intake reported.  Reported inability to place top of tubular    Allergies:  Patient has no known allergies.    Medicines:  Current Facility-Administered Medications   Medication Dose Route Frequency Provider Last Rate Last Admin    metoprolol tartrate (LOPRESSOR) tablet 25 mg  25 mg Oral BID Lexus Wilson MD   25 mg at 02/22/24 0912    morphine (PF) injection 2 mg  2 mg IntraVENous Q4H PRN Natalie Villa APRN 
New cherry red drainage from graham catheter note patient instructed not not to pull lines, no complaints of at this time WCTM  Heels elevated mepilex bilateral heels placed blandching reddend area wctm  
Not able to obtain O2 sat reading. All extremities appears cyanotic. Notified RT for help, still not able to get O2 sat.     Requests physician Ale zamora APRN arrived at bedside. Will consult hospice. No intervention needed at this time.    0808: Called pt's wife Lexus Oviedo, updated pt's condition. Will come to see pt shortly.  
Patient very weak unable to roll over in the bed requires max assistance.  
Physical Therapy  Facility/Department: [facility] PROGRESSIVE CARE  Physical Therapy Initial Assessment    Name: Pradip Oviedo  : 1955  MRN: [id number]  Date of Service: 2024    Discharge Recommendations:  Continue to assess pending progress, Patient would benefit from continued therapy after discharge          Patient Diagnosis(es): The encounter diagnosis was COPD exacerbation (HCC).  Past Medical History:  has a past medical history of Chronic heart failure with preserved ejection fraction (HFpEF) (HCC), COPD (chronic obstructive pulmonary disease) (HCC), and Hypertension.  Past Surgical History:  has no past surgical history on file.    Assessment   Body Structures, Functions, Activity Limitations Requiring Skilled Therapeutic Intervention: Decreased functional mobility ;Decreased ADL status;Decreased ROM;Decreased strength;Decreased safe awareness;Decreased cognition;Decreased endurance;Decreased balance;Decreased posture;Increased pain  Assessment: Pt REQUIRES MOTIVATION AND/OR ASSIST TO PARTICIPATE. SAT EOB BRIEFLY BUT UNABLE TO ATTEMPT STAND DUE MOSTLY TO ACTIVITY TOLERANCE. WILL PROGRESS AS TOLERATED.  Requires PT Follow-Up: Yes  Activity Tolerance  Activity Tolerance: Patient limited by endurance     Plan   Physical Therapy Plan  General Plan: 3-5 times per week  Current Treatment Recommendations: Strengthening, ROM, Balance training, Functional mobility training, Transfer training, Gait training, Safety education & training, Patient/Caregiver education & training, Endurance training  Safety Devices  Type of Devices: Bed alarm in place, Call light within reach, Nurse notified     Restrictions  Restrictions/Precautions  Restrictions/Precautions: Fall Risk     Subjective   Pain: VERBALIZES LE PAIN AT TIMES WITH CERTAIN MOVEMENTS  General  Diagnosis: AMS, COPD  Subjective  Subjective: Pt WILLING TO PARTICIPATE. WANTING TO GET OUT OF HOSPITAL.         Social/Functional History  Social/Functional 
Physical Therapy  Name: Pradip Oviedo  MRN:  [id number]  Date of service:  2/23/2024    Attempt this date.  Patient declined therapy today.    Electronically signed by Rubi Mcadams PTA on 2/23/2024 at 2:15 PM    
Pradip MICHAEL Oviedo received from 432 to room # 148 .  Mental Status: Patient is alert and intermittenly able to answer questions/ follow commands .   Vitals:    02/16/24 1447   BP: (!) 151/82   Pulse: 100   Resp: 23   Temp: 97.7 °F (36.5 °C)   SpO2:      Placed on cardiac monitor: Yes. Bedside monitor.  Belongings:  clothes  with patient at bedside .  Family at bedside Yes.  Oriented Patient and Family to room.  Call light within reach. Yes.  Transfer was: Physician notified..    Electronically signed by Maris Adkins RN on 2/16/2024 at 2:55 PM    
Pradip Oviedo transferred to 148 from [facility] via stretcher.    Reason for transfer: AMS, cyanosis   Explained reason for transfer to Patient and Family.   Belongings:  jeans, shirt, belt, personal belongings  with patient at bedside .   Soft chart transferred with patient: Yes.  Telemetry box number N/A transferred with patient: no.  Report given to: Maris LOPEZ , at bedside.      Electronically signed by Edith ASHBY RN on 2/16/2024 at 3:28 PM   
Pradip Ovieod transferred to 709 from [facility] via bed.    Reason for transfer: Lower level of care   Explained reason for transfer to Patient and Family.   Belongings: Jewelry with patient at bedside .   Soft chart transferred with patient: Yes.  Telemetry box number [id number] transferred with patient: yes.  Report given to: Viet LOPEZ, via telephone.      Electronically signed by Cynthia Kemp RN on 2/18/2024 at 11:05 AM   
Pt oxygen saturation declined, HR declined, TOD 0419 am. Post mortem care provided,   
Pt's phone , wallet (cash, cards, etc), and set of keys on keychain sent home with pt's wife Lexus (at bedside).     Electronically signed by Maris Adkins RN on 2/16/2024 at 7:11 PM    
Pt's wife states pt has been having moments of agitation and grabbing at his telemetry box   Pt's wife asks for something to keep him comfortable and something for him to not \"suffer\". This RN spoke with hospitalist. Orders received.   
Received order for dobhoff insertion, nurse attempts to insert the dobhoff, pt is fighting and pulling, turning head away during insertion, states:\" Im not able to do this thing! \"   Pt's wife is at the bed side, asking nurse to give pt different food. Nurse explain to pt and wife regarding SLP recommendation. Not able to advance the diet due to risk of aspiration.    Notified ALIYAH Hanna , failed to insert dobhoff tube with multiple attempts due to uncooperative behavior. Receive order SLP re-eval.  
[facility]   Pharmacy Pharmacokinetic Monitoring Service - Vancomycin    Consulting Provider: Ana M Tillman APRN - CNP   Indication: Sepsis of unknown etiology  Target Concentration:  Goal trough of 15-20 mg/L switch to -600 once renal function stable  Day of Therapy: 2  Additional Antimicrobials: azithromycin, ceftriaxone, flagyl    Pertinent Laboratory Values:   Wt Readings from Last 1 Encounters:   02/14/24 63.5 kg (140 lb)     Temp Readings from Last 1 Encounters:   02/18/24 97 °F (36.1 °C) (Temporal)     Estimated Creatinine Clearance: 36 mL/min (A) (based on SCr of 1.7 mg/dL (H)).  Recent Labs     02/16/24  2338 02/17/24  0820 02/18/24  0200 02/18/24  0325   CREATININE 1.8* 1.8*  --  1.7*   BUN 62* 68*  --  68*   WBC 8.9  --  13.4*  --        Pertinent Cultures:  Culture Date Source Results   2/14/24 Blood x2 No growth to date   2/17/24 Urine No growth    2/17/24 Blood x2 No growth to date   MRSA Nasal Swab: N/A. Non-respiratory infection.    Recent vancomycin administrations                     vancomycin (VANCOCIN) 1500 mg in sodium chloride 0.9 % 250 mL IVPB (mg) 1,500 mg New Bag 02/17/24 1126                    Assessment:  Date/Time Current Dose Concentration Timing of Concentration (h) AUC   2/18/24 0938 Pulse 16.8 21 h    Note: Serum concentrations collected for AUC dosing may appear elevated if collected in close proximity to the dose administered, this is not necessarily an indication of toxicity    Plan:  Current dosing regimen is therapeutic  Give 1250mg today  Repeat vancomycin concentration ordered for 2/19 @ 0800   Pharmacy will continue to monitor patient and adjust therapy as indicated    Thank you for the consult,  Sharon Zafar RPH  2/18/2024 9:33 AM   
[facility]   Pharmacy Pharmacokinetic Monitoring Service - Vancomycin    Consulting Provider: Ana M Tillman APRN - CNP   Indication: Sepsis of Unknown Etiology   Target Concentration: Goal trough of 15-20 mg/L switch to -600 once renal function stable  Day of Therapy: 3  Additional Antimicrobials: Ceftriaxone, metronidazole    Pertinent Laboratory Values:   Wt Readings from Last 1 Encounters:   02/19/24 59.5 kg (131 lb 3 oz)     Temp Readings from Last 1 Encounters:   02/19/24 97.5 °F (36.4 °C)     Estimated Creatinine Clearance: 48 mL/min (based on SCr of 1.2 mg/dL).  Recent Labs     02/18/24  0200 02/18/24  0325 02/19/24  0530   CREATININE  --  1.7* 1.2   BUN  --  68* 55*   WBC 13.4*  --  11.9*     Procalcitonin: not ordered    Pertinent Cultures:  Culture Date Source Results   2/14/24 Blood x2 No growth to date   2/17/24 Urine No growth    2/17/24 Blood x2 No growth to date   MRSA Nasal Swab: N/A. Non-respiratory infection.    Recent vancomycin administrations                     vancomycin (VANCOCIN) 1,250 mg in sodium chloride 0.9 % 250 mL IVPB (mg) 1,250 mg New Bag 02/18/24 1115    vancomycin (VANCOCIN) 1500 mg in sodium chloride 0.9 % 250 mL IVPB (mg) 1,500 mg New Bag 02/17/24 1126                    Assessment:  Date/Time Current Dose Concentration Timing of Concentration (h)   02/19 0712 1250 mg IV once 18.7 20 h   Note: Serum concentrations collected for AUC dosing may appear elevated if collected in close proximity to the dose administered, this is not necessarily an indication of toxicity    Plan:  Will give vancomycin 1000 mg IV once today  Repeat vancomycin concentration ordered for 2/20 @ 0600   Pharmacy will continue to monitor patient and adjust therapy as indicated    Thank you for the consult,  Isaiah Reese RPH  2/19/2024 9:20 AM    
[facility]   Pharmacy Pharmacokinetic Monitoring Service - Vancomycin     Pradip Oviedo is a 69 y.o. male starting on vancomycin therapy for Sepsis of Unknown Etiology. Pharmacy consulted by Ana M Tillman APRN - CNP, for monitoring and adjustment.    Target Concentration:   Goal trough of 15-20 mg/L switch to -600 once renal function stable    Additional Antimicrobials: Ceftriaxone    Pertinent Laboratory Values:   Wt Readings from Last 1 Encounters:   02/14/24 63.5 kg (140 lb)     Temp Readings from Last 1 Encounters:   02/17/24 98.1 °F (36.7 °C) (Rectal)     Estimated Creatinine Clearance: 34 mL/min (A) (based on SCr of 1.8 mg/dL (H)).  Recent Labs     02/16/24  1615 02/16/24  2151 02/16/24  2338   CREATININE  --  1.8* 1.8*   BUN  --  61* 62*   WBC 8.2  --  8.9     Procalcitonin: None ordered at this time    Pertinent Cultures:  Culture Date Source Results   02/14/24 Blood x 2 No growth to date   02/17/24 Urine No result    Blood x 2 Ordered   MRSA Nasal Swab: N/A. Non-respiratory infection.    Plan:  Concentration-guided dosing due to renal impairment/insufficiency  Start vancomycin pulse dose. Give loading dose of Vancomycin 1500 mg IV x 1.  Renal labs as indicated   Vancomycin concentration ordered for 02/18/24 @ 0900   Pharmacy will continue to monitor patient and adjust therapy as indicated    Thank you for the consult,  Aileen Blount, [facility]  2/17/2024 8:55 AM    
Vascular Surgery  Dr. Vianey Mann   Daily Progress Note    Pt Name: Pradip Oviedo  Medical Record Number: [id number]  Date of Birth 1955   Today's Date: 2/21/2024  SUBJECTIVE:     Patient was seen and examined.  Stating he feels \"okay\" today  Stating he still wants to go to [place] to see his brother    OBJECTIVE:     Patient Vitals for the past 24 hrs:   BP Temp Temp src Pulse Resp SpO2 Height   02/21/24 0741 (!) 147/88 97 °F (36.1 °C) Temporal (!) 110 18 92 % --   02/21/24 0506 (!) 142/99 (!) 96.6 °F (35.9 °C) -- (!) 113 18 94 % --   02/21/24 0053 (!) 140/93 (!) 96.6 °F (35.9 °C) Temporal 60 16 91 % --   02/20/24 2009 (!) 131/96 96.8 °F (36 °C) Temporal (!) 103 18 98 % --   02/20/24 1608 133/79 97 °F (36.1 °C) Temporal (!) 102 18 94 % --   02/20/24 1449 -- -- -- -- -- -- 1.702 m (5' 7.01\")   02/20/24 1128 (!) 140/84 97 °F (36.1 °C) Temporal (!) 101 18 100 % --       Intake/Output Summary (Last 24 hours) at 2/21/2024 0903  Last data filed at 2/20/2024 2107  Gross per 24 hour   Intake 786.06 ml   Output 325 ml   Net 461.06 ml     In: 786.1 [P.O.:60; I.V.:626.1]  Out: 325 [Urine:325]    I/O last 3 completed shifts:  In: 786.1 [P.O.:60; I.V.:626.1; IV Piggyback:100]  Out: 475 [Urine:475]     Wt Readings from Last 3 Encounters:   02/20/24 61.7 kg (136 lb 1 oz)   02/17/24 63.5 kg (140 lb)   04/03/23 61.8 kg (136 lb 3.2 oz)      Body mass index is 21.31 kg/m².     Diet: ADULT DIET; Dysphagia - Pureed; Mildly Thick (Nectar)  ADULT ORAL NUTRITION SUPPLEMENT; Lunch, Dinner; Frozen Oral Supplement  ADULT ORAL NUTRITION SUPPLEMENT; Breakfast; Fortified Gelatin Oral Supplement    MEDS:     Scheduled Meds:   nitroglycerin  0.5 inch Topical 4 times per day    methylPREDNISolone  40 mg IntraVENous Q12H    aspirin  81 mg Oral Daily    guaiFENesin  600 mg Oral Daily    albuterol sulfate HFA  2 puff Inhalation Once    sodium chloride flush  5-40 mL IntraVENous 2 times per day    ipratropium 0.5 mg-albuterol 2.5 mg  1 Dose 
Vascular lab preliminary results.  Bilateral lower extremity arterial segmental exam performed.     Right:  PT Non-compressible  DP Non-compressible  Digit 0    Left:  PT 0.86  DP Non-compressible  Digit 0    Final report pending.   
Wife asks to discontinue all lab work on patient and to get VS as needed.   
8.6*  --  7.8* 7.4*     Recent Labs     02/16/24  2338 02/19/24  0530   MG 2.7*  --    PHOS 6.2* 3.2     Recent Labs     02/16/24  2338 02/18/24  0325 02/19/24  0530   AST 1,235* 3,600* 2,061*   * 2,108* 1,867*   BILITOT 1.4* 1.9* 2.2*   ALKPHOS 85 85 93     HgBA1c:  No components found for: \"HGBA1C\"  FLP:    Lab Results   Component Value Date/Time    TRIG 77 04/04/2023 11:43 AM    HDL 37 04/04/2023 11:43 AM    LDLCALC 59 04/04/2023 11:43 AM     TSH:    Lab Results   Component Value Date/Time    TSH 3.070 02/16/2024 11:38 PM     Troponin T: No results for input(s): \"TROPONINI\" in the last 72 hours.  INR:   Recent Labs     02/16/24  1615 02/16/24 2338   INR 1.93* 3.09*       No results for input(s): \"LIPASE\" in the last 72 hours.  -----------------------------------------------------------------  RAD:     MRI BRAIN WO CONTRAST    Result Date: 2/18/2024  EXAM:  MRI OF THE BRAIN WITHOUT CONTRAST  HISTORY:  Altered mental status  TECHNIQUE:  Multiplanar imaging of the brain was performed using T1, T to, inversion recovery, diffusion, T2 gradient sequences.  Comparison CT scan of the head dated 02/16/2024..  FINDINGS:  There is no restricted diffusion.  The lateral ventricles and cortical sulci are normal.  Scattered T2 high signal foci are seen within the supratentorial white matter.  No acute hemorrhages are seen.  There is no mass effect.  There are no extraaxial collections.  The craniocervical junction and midline structures are normal.  The soft tissues of the skull base and nasopharynx appear normal.      There is no acute cerebral infarction.  Mild chronic small vessel ischemic changes seen within the supratentorial white matter.   ______________________________________ Electronically signed by: MADISON NUNN M.D. Date:     02/18/2024 Time:    08:09      RENAL COMPLETE    Result Date: 2/17/2024  EXAM: 02/17/2024  HISTORY: Acute kidney injury  COMPARISON: 08/16/2024  FINDINGS: The right kidney measures 9.0 
Fortified Gelatin Oral Supplement    Anthropometric Measures:  Height: 170.2 cm (5' 7.01\")  Ideal Body Weight (IBW): 148 lbs (67 kg)    Admission Body Weight: 63.5 kg (140 lb)  Current Body Weight: 61 kg (134 lb 7.7 oz), 90.9 % IBW.    Current BMI (kg/m2): 21.1  BMI Categories: Underweight (BMI less than 22) age over 65    Estimated Daily Nutrient Needs:  Energy Requirements Based On: Kcal/kg  Weight Used for Energy Requirements: Current  Energy (kcal/day): 6999-9996  Weight Used for Protein Requirements: Current  Protein (g/day):   Method Used for Fluid Requirements: Other (Comment) (25-30 mL/kg for underweight HF)  Fluid (ml/day): 9863-7210    Nutrition Diagnosis:   Severe malnutrition, In context of acute illness or injury related to inadequate protein-energy intake as evidenced by Criteria as identified in malnutrition assessment, wounds    Nutrition Interventions:   Food and/or Nutrient Delivery: Continue Current Diet, Continue Oral Nutrition Supplement, Start Tube Feeding  Nutrition Education/Counseling: No recommendation at this time  Coordination of Nutrition Care: Continue to monitor while inpatient       Goals:  Previous Goal Met: No Progress toward Goal(s)  Goals: PO intake 50% or greater, Initiate PO diet       Nutrition Monitoring and Evaluation:   Behavioral-Environmental Outcomes: None Identified  Food/Nutrient Intake Outcomes: Diet Advancement/Tolerance, Food and Nutrient Intake, Supplement Intake, Enteral Nutrition Intake/Tolerance  Physical Signs/Symptoms Outcomes: Biochemical Data, Fluid Status or Edema, Hemodynamic Status, Nutrition Focused Physical Findings, Weight    Discharge Planning:    Too soon to determine     Lorraine Sam MS, RD, LD  Contact: [phone]    
identified.  Some segments of the popliteal artery do not demonstrate definite contrast opacification concerning for occlusion.  The lower leg arteries are heavily calcified, limiting evaluation for patency.   Left lower extremity: Moderate common femoral artery stenosis.  Patent profunda femoris artery.  Multifocal superficial femoral artery stenosis, up to at least moderate.  Patent popliteal artery with at least moderate stenosis at its midportion.  Limited evaluation of the lower leg artery secondary to heavily calcified plaque.   Nonvascular findings:  Left greater than right lower extremity edema.  Small bilateral knee joint effusions.  There are small bilateral pleural effusions.  The heart is enlarged.  A small pericardial effusion is partially imaged.  There is reflux of contrast into hepatic veins.  Vicarious excretion of contrast is seen in the gallbladder.  Left renal cyst measuring 2.4 cm.  Diverticulosis is identified without evidence of diverticulitis.  Residual contrast is seen within the bladder.  Anasarca is identified.   IMPRESSION:  Extensive atherosclerosis.   Subtle detail of the lower extremities.   Multifocal right superficial femoral artery stenosis, up to at least moderate distally.  Probable severe multifocal right popliteal stenosis with some areas concerning for occlusion.  Limited evaluation of lower leg artery secondary to marked calcified atherosclerotic plaque.   Moderate left common femoral artery stenosis.  Multifocal left superficial femoral artery and popliteal artery stenosis, up to at least moderate.  Limited evaluation of lower leg arteries secondary to marked calcified plaque.   Ultrasound could be considered for further evaluation.   Left greater than right lower extremity edema.   Cardiomegaly.  Reflux of contrast into hepatic veins which can be seen with congestive heart failure.   Small bilateral pleural effusions.   Diverticulosis.   Anasarca.    Electronically signed 
Death    ECOG:(3) Capable of limited self-care, confined to bed or chair > 50% of waking hours    CLINICAL PAIN ASSESSMENT:   Pt with AMS, no nonverbal indicators of pain present    Assessment/Plan   Principal Problem:    COPD exacerbation (HCC)  Active Problems:    Elevated brain natriuretic peptide (BNP) level    Hypoxia    Palliative care patient    Localized cyanosis    Buerger's disease (HCC)    Mild malnutrition (HCC)  Resolved Problems:    * No resolved hospital problems. *      Visit Summary:  Chart reviewed, patient discussed with nursing staff. Reviewed health issues, work up and treatment plan as well as factors that lead to hospitalization. Mr. Oviedo is seen at bedside this morning with spouse, Lexus, and family friend, April RAPHAEL, present. Additional family meeting conducted with hospitalist team and CM present.  Further discussions conducted regarding status and goals of care.  Options for hospice expanded on today including inpatient versus outpatient and qualifications for each.  CM also obtained information regarding daily cost at different facilities with outpatient hospice and attempting to reapply for Medicaid if patient qualifies.  Lexus reports they have had good experience at [facility] and CM will contact their facility today.  Patient does not currently qualify for inpatient level of hospice care.  Palliative care is unavailable over the weekend.  If he should decline further over the weekend and develop distress please notify on-call hospice team for evaluation for potential inpatient hospice admission.  As needed medications are available for symptom management to promote comfort.  Opportunity for questions and emotional support provided to spouse and April. Will continue to follow.      Recommendations:      Palliative Care- [facility] continue all current medical treatment/workup and monitor for improvement.  D/C planning based on hospital course.  Hospice referral ordered, working on discharge 
base and nasopharynx appear normal.      There is no acute cerebral infarction.  Mild chronic small vessel ischemic changes seen within the supratentorial white matter.   ______________________________________ Electronically signed by: MADISON NUNN M.D. Date:     02/18/2024 Time:    08:09     US RENAL COMPLETE    Result Date: 2/17/2024  EXAM: 02/17/2024  HISTORY: Acute kidney injury  COMPARISON: 08/16/2024  FINDINGS: The right kidney measures 9.0 x 4.8 x 6.0 cm.  Cortex 0.9 cm.  No hydronephrosis.  Normal color Doppler flow.  The left kidney measures 8.6 x 4.9 x 5.2 cm.  Cortex 1.6 cm.  No hydronephrosis.  Normal color Doppler flow.  Benign appearing cyst at the lower pole of the left kidney measures 1.6 x 2.4 x 1.6 cm.      1.  No hydronephrosis. 2.  No acute process of the right or left kidney. 3.  Benign appearing left renal cyst.   ______________________________________ Electronically signed by: CHERI CABRERA M.D. Date:     02/17/2024 Time:    13:29     CTA ABDOMINAL AORTA W BILAT RUNOFF W CONTRAST    Result Date: 2/16/2024  EXAM: CTA  OF THE ABDOMEN AND PELVIS WITH ANGIOGRAM RUN OFF OF THE LOWER EXTREMITIES  HISTORY:  Great toe cyanosis.  COMPARISON: None available.  TECHNIQUE:  CTA of the abdomen and pelvis with angiogram run off of the lower extremities.  Sagittal, coronal and 3D reformats were obtained.  FINDINGS: Vascular findings: There is no abdominal aortic aneurysm.  Extensive calcified plaque is seen throughout the abdominal aorta and its branches.  There is probable mild stenosis of the celiac artery origin.  Moderate proximal superior mesenteric artery stenosis is seen.  There is minimal right and probable mild to moderate left renal artery stenosis.  The inferior mesenteric artery is patent.  Extensive calcified plaque of the bilateral common and external iliac arteries is seen with mild right and moderate left stenosis.  Right lower extremity: Mild to moderate common femoral artery stenosis.  Patent 
performed using dose optimization techniques as appropriate to the performed exam and include at least one of the following: Automated exposure control, adjustment of the mA and/or kV according to size, and the use of iterative reconstruction technique.  ______________________________________ Electronically signed by: SWETHA REARDON M.D. Date:     02/16/2024 Time:    15:20     CTA PULMONARY W CONTRAST    Result Date: 2/16/2024  1.  Normal CT pulmonary angiogram with no evidence of pulmonary artery embolism.  Note is made that evaluation of the subsegmental and distal vessels is limited. 2.  Reflux of contrast into the inferior vena cava and hepatic veins can be seen in right heart failure. 3.  Small bilateral effusions and mild subsegmental atelectasis.  All CT scans are performed using dose optimization techniques as appropriate to the performed exam and include at least one of the following: Automated exposure control, adjustment of the mA and/or kV according to size, and the use of iterative reconstruction technique.  ______________________________________ Electronically signed by: ADENIKE FOX M.D. Date:     02/16/2024 Time:    09:04     XR CHEST PORTABLE    Result Date: 2/14/2024  Cardiomegaly and atherosclerosis.  There is a small left pleural effusion with mild adjacent density suggesting atelectasis and / or pneumonia.  Lungs are otherwise grossly clear.  No noticeable vascular congestion, interstitial edema or pneumothorax.     ______________________________________ Electronically signed by: DARWIN EVANS M.D. Date:     02/14/2024 Time:    13:52        Echo: pending  Micro:   Results       Procedure Component Value Units Date/Time    Culture, Blood 2 [[id number]] Collected: 02/17/24 0934    Order Status: Sent Specimen: Blood Updated: 02/17/24 1001    Culture, Blood 1 [[id number]] Collected: 02/17/24 0926    Order Status: Sent Specimen: Blood Updated: 02/17/24 1001    Culture, Urine [[id number]] Collected: 
processes.  Extremity cyanosis- vascular surgery consulted.  Concern for Buerger's syndrome.  Heparin drip continued.  CTA of aorta with runoff chronic calcified multilevel vascular disease in bilateral lower extremities worse on the right side.  He likely would benefit from surgical intervention to improve flow once medically stabilized per vascular note  Dysphagia- SLP following. Advance diet as able. Plans to initiate AGUILA with dobhoff and tube feed per spouse request 2/21/24    Thank you for consulting Palliative Care and allowing us to participate in the care of this patient.     Multiple complex medical problems  Morbidity mortality high risk  Medical decision making High complexity   Completed patient assessment, interview of independent historian/HCS, workup/treatment review, discussion with medical team, review of current and home medications, coordinating and conducting family meeting, AGUILA education, medication initiation and monitoring for symptom management, and placement of orders/preparation of this note                               Electronically signed by ALIYAH Perla CNP on 2/22/2024 at 9:31 AM    (Please note that portions of this note were completed with a voice recognition program.  Effortswere made to edit the dictations but occasionally words are mis-transcribed.)  
:  02/14/24 15:13    Culture, Blood 1 [[id number]] Collected: 02/14/24 1500    Order Status: Completed Specimen: Blood Updated: 02/15/24 1714     Blood Culture, Routine No Growth to date.  Any change in status will be called.    Narrative:      ORDER#: [id number]                          ORDERED BY: PRIMO NUNEZ  SOURCE: Blood RAC                          COLLECTED:  02/14/24 15:00  ANTIBIOTICS AT [facility].:                      RECEIVED :  02/14/24 15:12    Respiratory Panel, Molecular, with COVID-19 (Restricted: peds pts or suitable admitted adults) [[id number]] Collected: 02/14/24 1319    Order Status: Completed Specimen: Nasopharyngeal Updated: 02/14/24 1413     Adenovirus by PCR Not Detected     Bordetella parapertussis by PCR Not Detected     Bordetella pertussis by PCR Not Detected     Chlamydophilia pneumoniae by PCR Not Detected     Coronavirus 229E by PCR Not Detected     Coronavirus HKU1 by PCR Not Detected     Coronavirus NL63 by PCR Not Detected     Coronavirus OC43 by PCR Not Detected     SARS-CoV-2, PCR Not Detected     Human Metapneumovirus by PCR Not Detected     Human Rhinovirus/Enterovirus by PCR Not Detected     Influenza A by PCR Not Detected     Influenza B by PCR Not Detected     Mycoplasma pneumoniae by PCR Not Detected     Parainfluenza Virus 1 by PCR Not Detected     Parainfluenza Virus 2 by PCR Not Detected     Parainfluenza Virus 3 by PCR Not Detected     Parainfluenza Virus 4 by PCR Not Detected     Respiratory Syncytial Virus by PCR Not Detected          Assessment/Plan   Principal Problem:  Acute mental status change / shock              -CT head no acute finding, no evidence of hemorrhage              -Ammonia 19              -Folate and vitamin B12 within normal limits              -Monitor CBC and BMP              -Neurochecks              -ABGs obtained on 2/16 relatively unremarkable   -MRI of the brain-no acute cerebral infarction  -Repeat blood cultures, NGTD  -Continue IV 
Order Status: Completed Specimen: Blood Updated: 02/19/24 1714     Culture, Blood 2 No growth after 5 days of incubation.    Narrative:      ORDER#: [id number]                          ORDERED BY: PRIMO NUNEZ  SOURCE: Blood LAC                          COLLECTED:  02/14/24 15:07  ANTIBIOTICS AT [facility].:                      RECEIVED :  02/14/24 15:13    Culture, Blood 1 [[id number]] Collected: 02/14/24 1500    Order Status: Completed Specimen: Blood Updated: 02/19/24 1714     Blood Culture, Routine No growth after 5 days of incubation.    Narrative:      ORDER#: [id number]                          ORDERED BY: PRIMO NUNEZ  SOURCE: Blood RAC                          COLLECTED:  02/14/24 15:00  ANTIBIOTICS AT [facility].:                      RECEIVED :  02/14/24 15:12    Respiratory Panel, Molecular, with COVID-19 (Restricted: peds pts or suitable admitted adults) [[id number]] Collected: 02/14/24 1319    Order Status: Completed Specimen: Nasopharyngeal Updated: 02/14/24 1413     Adenovirus by PCR Not Detected     Bordetella parapertussis by PCR Not Detected     Bordetella pertussis by PCR Not Detected     Chlamydophilia pneumoniae by PCR Not Detected     Coronavirus 229E by PCR Not Detected     Coronavirus HKU1 by PCR Not Detected     Coronavirus NL63 by PCR Not Detected     Coronavirus OC43 by PCR Not Detected     SARS-CoV-2, PCR Not Detected     Human Metapneumovirus by PCR Not Detected     Human Rhinovirus/Enterovirus by PCR Not Detected     Influenza A by PCR Not Detected     Influenza B by PCR Not Detected     Mycoplasma pneumoniae by PCR Not Detected     Parainfluenza Virus 1 by PCR Not Detected     Parainfluenza Virus 2 by PCR Not Detected     Parainfluenza Virus 3 by PCR Not Detected     Parainfluenza Virus 4 by PCR Not Detected     Respiratory Syncytial Virus by PCR Not Detected          Assessment/Plan   Principal Problem:  Acute mental status change / shock              -CT head no acute finding, no evidence of 
at least one of the following: Automated exposure control, adjustment of the mA and/or kV according to size, and the use of iterative reconstruction technique.  ______________________________________ Electronically signed by: SWETHA REARDON M.D. Date:     02/16/2024 Time:    15:20     CTA PULMONARY W CONTRAST    Result Date: 2/16/2024  EXAM: CHEST CTA WITH CONTRAST (PULMONARY ARTERY)  HISTORY: Shortness of breath, elevated D-dimer.  TECHNIQUE: CTA acquisition of the chest from the thoracic inlet to the upper abdomen following IV contrast administration timed to filling of the pulmonary artery.  3D/MIP/VR images were utilized. CT Dose Reduction Techniques Employed: Yes.  IV Contrast: Administered.  COMPARISON: Chest x-ray 02/14/2024.  FINDINGS: The examination is limited by motion artifact.  Pulmonary Embolism:  Diagnostic quality: Suboptimal.  Central (Main/Lobar/Interlobar): No embolus.  Peripheral (Segmental/Subsegmental): No embolus given limitations of the study.  Right ventricle/Left ventricle ratio: 1.0.  Lines, Tubes, Devices: None. Lung Parenchyma and Airways: Central airways are patent without endobronchial lesion.  Mild subsegmental atelectasis in the lung bases.  No suspicious pulmonary nodule. Pleural Space: Small bilateral effusions. Mediastinum and Sussy: Limited evaluation of the thyroid gland.  No lymphadenopathy. Heart and Pericardium: There is no pericardial effusion or thickening.  The heart is generous in size. Vessels: The thoracic aorta is of normal caliber.  There is reflux of contrast into the inferior vena cava and hepatic veins which can be seen in right heart failure. Small to moderate atherosclerotic calcification. Bones: Spondylosis and degenerative disc disease.  Kyphosis.  Moderate chronic superior endplate compression fracture of the T12. Soft Tissues: Bilateral gynecomastia. Upper Abdomen:  Renal vascular calcification.      1.  Normal CT pulmonary angiogram with no evidence of 
-Continue ICU level of care     Antibiotic: Vancomycin, Rocephin, and Flagyl     DVT Prophylaxis: Heparin ALIYAH Gordon - CNP, 2/18/2024 2:17 PM  
of the following: Automated exposure control, adjustment of the mA and/or kV according to size, and the use of iterative reconstruction technique.  ______________________________________ Electronically signed by: ADENIKE FOX M.D. Date:     02/16/2024 Time:    09:04     XR CHEST PORTABLE    Result Date: 2/14/2024  EXAM:  CHEST FRONTAL VIEW  HISTORY:  Shortness of breath COMPARISON:  None - - - - -    Cardiomegaly and atherosclerosis.  There is a small left pleural effusion with mild adjacent density suggesting atelectasis and / or pneumonia.  Lungs are otherwise grossly clear.  No noticeable vascular congestion, interstitial edema or pneumothorax.     ______________________________________ Electronically signed by: DARWIN EVANS M.D. Date:     02/14/2024 Time:    13:52        Assessment     Acute kidney injury/prerenal  COPD with acute exacerbation  Hyperkalemia  Acute liver injury  Left renal cyst  Hypokalemia    Plan:  Discussed with patient, nursing  Workup reviewed today  Monitor labs  IV fluids with adjustments as per orders - try off ivf today, otherwise may need AGUILA  Replace potassium per protocol as needed    Gustavo Gaona MD  02/21/24  12:15 PM  
performed using dose optimization techniques as appropriate to the performed exam and include at least one of the following: Automated exposure control, adjustment of the mA and/or kV according to size, and the use of iterative reconstruction technique.  ______________________________________ Electronically signed by: ADENIKE FOX M.D. Date:     02/16/2024 Time:    09:04     XR CHEST PORTABLE    Result Date: 2/14/2024  EXAM:  CHEST FRONTAL VIEW  HISTORY:  Shortness of breath COMPARISON:  None - - - - -    Cardiomegaly and atherosclerosis.  There is a small left pleural effusion with mild adjacent density suggesting atelectasis and / or pneumonia.  Lungs are otherwise grossly clear.  No noticeable vascular congestion, interstitial edema or pneumothorax.     ______________________________________ Electronically signed by: DARWIN EVANS M.D. Date:     02/14/2024 Time:    13:52        Assessment     Acute kidney injury/prerenal  COPD with acute exacerbation  Hyperkalemia  Acute liver injury  Left renal cyst  Hypokalemia  Hyponatremia    Plan:  Discussed with patient as able, nursing  Workup reviewed today  Monitor labs  IV fluids with adjustments as per orders - trying off ivf, otherwise may need AGUILA versus hospice  Replace potassium per protocol as needed    Gustavo Gaona MD  02/24/24  6:58 PM

## 2024-02-25 NOTE — DISCHARGE SUMMARY
86*     BMP:    Recent Labs     24  1046 24  0200    147*   K 4.2 4.6    112*   CO2 21* 21*   BUN 57* 59*   CREATININE 1.0 1.1   GLUCOSE 148* 139*     INR: No results for input(s): \"INR\" in the last 72 hours.    Physical Exam:  Vital Signs: BP (!) 64/27 Comment: RN notified  Pulse (!) 112 Comment: RN notified  Temp (!) 96.4 °F (35.8 °C) (Temporal) Comment: RN notified  Resp 10   Ht 1.702 m (5' 7.01\")   Wt 59.1 kg (130 lb 3 oz)   SpO2 (!) 88% Comment: RN notified  BMI 20.39 kg/m²       Diet: No diet orders on file     Disposition: Patient has .    Time spent on discharge 32 minutes spent in assessing patient, reviewing medications, discussion with nursing, confirming safe discharge plan and preparation of discharge summary.    Signed:  ALIYAH Gambino, 2024 9:51 AM